# Patient Record
Sex: FEMALE | Race: WHITE | Employment: OTHER | URBAN - NONMETROPOLITAN AREA
[De-identification: names, ages, dates, MRNs, and addresses within clinical notes are randomized per-mention and may not be internally consistent; named-entity substitution may affect disease eponyms.]

---

## 2017-04-14 ENCOUNTER — OFFICE VISIT (OUTPATIENT)
Dept: NEUROLOGY | Age: 67
End: 2017-04-14
Payer: COMMERCIAL

## 2017-04-14 VITALS
BODY MASS INDEX: 26.82 KG/M2 | HEART RATE: 83 BPM | HEIGHT: 63 IN | WEIGHT: 151.38 LBS | DIASTOLIC BLOOD PRESSURE: 88 MMHG | OXYGEN SATURATION: 99 % | SYSTOLIC BLOOD PRESSURE: 136 MMHG

## 2017-04-14 DIAGNOSIS — R53.83 OTHER FATIGUE: Primary | ICD-10-CM

## 2017-04-14 DIAGNOSIS — R42 DIZZINESS: ICD-10-CM

## 2017-04-14 DIAGNOSIS — R53.1 WEAKNESS: ICD-10-CM

## 2017-04-14 PROCEDURE — 99204 OFFICE O/P NEW MOD 45 MIN: CPT | Performed by: PSYCHIATRY & NEUROLOGY

## 2017-04-14 RX ORDER — ASCORBIC ACID 500 MG
500 TABLET ORAL DAILY
COMMUNITY
End: 2018-10-02 | Stop reason: ALTCHOICE

## 2017-04-14 RX ORDER — RANITIDINE 300 MG/1
300 TABLET ORAL DAILY
COMMUNITY
End: 2020-02-24 | Stop reason: SINTOL

## 2017-04-14 RX ORDER — LEVOTHYROXINE SODIUM 0.12 MG/1
150 TABLET ORAL DAILY
COMMUNITY
End: 2018-01-30

## 2017-04-14 RX ORDER — NITROFURANTOIN MACROCRYSTALS 100 MG/1
100 CAPSULE ORAL PRN
COMMUNITY
End: 2021-05-10 | Stop reason: SDUPTHER

## 2017-04-14 RX ORDER — OMEGA-3S/DHA/EPA/FISH OIL/D3 300MG-1000
400 CAPSULE ORAL DAILY
COMMUNITY
End: 2018-01-30

## 2017-04-14 RX ORDER — VITAMIN B COMPLEX
1 CAPSULE ORAL DAILY
COMMUNITY
End: 2018-10-02 | Stop reason: ALTCHOICE

## 2017-07-03 ENCOUNTER — TRANSCRIBE ORDERS (OUTPATIENT)
Dept: ADMINISTRATIVE | Facility: HOSPITAL | Age: 67
End: 2017-07-03

## 2017-07-03 DIAGNOSIS — Z12.31 ENCOUNTER FOR SCREENING MAMMOGRAM FOR MALIGNANT NEOPLASM OF BREAST: Primary | ICD-10-CM

## 2017-07-17 ENCOUNTER — HOSPITAL ENCOUNTER (OUTPATIENT)
Dept: MAMMOGRAPHY | Facility: HOSPITAL | Age: 67
Discharge: HOME | End: 2017-07-17
Attending: OBSTETRICS & GYNECOLOGY | Admitting: OBSTETRICS & GYNECOLOGY

## 2017-07-17 DIAGNOSIS — Z12.31 ENCOUNTER FOR SCREENING MAMMOGRAM FOR MALIGNANT NEOPLASM OF BREAST: ICD-10-CM

## 2017-07-17 PROCEDURE — G0202 SCR MAMMO BI INCL CAD: HCPCS

## 2017-07-17 PROCEDURE — 77063 BREAST TOMOSYNTHESIS BI: CPT

## 2018-01-30 ENCOUNTER — OFFICE VISIT (OUTPATIENT)
Dept: GASTROENTEROLOGY | Age: 68
End: 2018-01-30
Payer: MEDICARE

## 2018-01-30 VITALS
HEIGHT: 62 IN | WEIGHT: 157.8 LBS | OXYGEN SATURATION: 99 % | BODY MASS INDEX: 29.04 KG/M2 | DIASTOLIC BLOOD PRESSURE: 74 MMHG | SYSTOLIC BLOOD PRESSURE: 132 MMHG | HEART RATE: 82 BPM

## 2018-01-30 DIAGNOSIS — R10.32 LEFT LOWER QUADRANT ABDOMINAL PAIN OF UNKNOWN ETIOLOGY: ICD-10-CM

## 2018-01-30 DIAGNOSIS — R19.7 DIARRHEA, UNSPECIFIED TYPE: ICD-10-CM

## 2018-01-30 DIAGNOSIS — D64.9 ANEMIA, UNSPECIFIED TYPE: Primary | ICD-10-CM

## 2018-01-30 PROCEDURE — 3014F SCREEN MAMMO DOC REV: CPT | Performed by: NURSE PRACTITIONER

## 2018-01-30 PROCEDURE — 1036F TOBACCO NON-USER: CPT | Performed by: NURSE PRACTITIONER

## 2018-01-30 PROCEDURE — 4040F PNEUMOC VAC/ADMIN/RCVD: CPT | Performed by: NURSE PRACTITIONER

## 2018-01-30 PROCEDURE — 1123F ACP DISCUSS/DSCN MKR DOCD: CPT | Performed by: NURSE PRACTITIONER

## 2018-01-30 PROCEDURE — 3017F COLORECTAL CA SCREEN DOC REV: CPT | Performed by: NURSE PRACTITIONER

## 2018-01-30 PROCEDURE — G8419 CALC BMI OUT NRM PARAM NOF/U: HCPCS | Performed by: NURSE PRACTITIONER

## 2018-01-30 PROCEDURE — G8427 DOCREV CUR MEDS BY ELIG CLIN: HCPCS | Performed by: NURSE PRACTITIONER

## 2018-01-30 PROCEDURE — 1090F PRES/ABSN URINE INCON ASSESS: CPT | Performed by: NURSE PRACTITIONER

## 2018-01-30 PROCEDURE — G8400 PT W/DXA NO RESULTS DOC: HCPCS | Performed by: NURSE PRACTITIONER

## 2018-01-30 PROCEDURE — G8484 FLU IMMUNIZE NO ADMIN: HCPCS | Performed by: NURSE PRACTITIONER

## 2018-01-30 PROCEDURE — 99203 OFFICE O/P NEW LOW 30 MIN: CPT | Performed by: NURSE PRACTITIONER

## 2018-01-30 RX ORDER — CHOLECALCIFEROL (VITAMIN D3) 1MM UNIT/G
LIQUID (GRAM) MISCELLANEOUS
COMMUNITY

## 2018-01-30 RX ORDER — LEVOTHYROXINE SODIUM 0.03 MG/1
50 TABLET ORAL
COMMUNITY
Start: 2018-01-22 | End: 2018-09-06 | Stop reason: DRUGHIGH

## 2018-01-30 ASSESSMENT — ENCOUNTER SYMPTOMS
BLOOD IN STOOL: 0
COUGH: 0
VOMITING: 0
NAUSEA: 0
SHORTNESS OF BREATH: 0
ANAL BLEEDING: 0
VOICE CHANGE: 0
ABDOMINAL DISTENTION: 0
SORE THROAT: 0
CONSTIPATION: 0
RECTAL PAIN: 0
ABDOMINAL PAIN: 1
BACK PAIN: 1
DIARRHEA: 1
TROUBLE SWALLOWING: 0

## 2018-01-30 NOTE — PATIENT INSTRUCTIONS
You are going to have a colonoscopy and here are some instructions: You will be given specific directions regarding restrictions to diet and bowel prep instructions including laxatives. Please read these instructions one week prior to your scheduled procedure to ensure that you are prepared. Follow prep instructions provided for bowel prep. Take all of the bowel prep as directed. If you are having problems with nausea, stop your prep for 30-45 min to allow the nausea to subside before resuming your prep. Nothing to eat or drink after midnight the day of the procedure EXCEPT:  PLEASE TAKE MEDICATION(S) FOR HIGH BLOOD PRESSURE, THYROID, SEIZURES, AND HEART THE MORNING OF THE PROCEDURE WITH A SIP OF WATER  AT LEAST 2 HOURS PRIOR TO ARRIVAL TIME.   YOU MAY ALSO TAKE ANY INHALERS YOU ARE PRESCRIBED. You will not be able to drive for 24 hours after the procedure due to sedation. Bring a  with you the day of the procedure. No aspirin, ibuprofen, naproxen, fish oil or vitamin E for 5 days before procedure. If you are on blood thinners, clearance from the prescribing physician will be obtained before your procedure is scheduled. Increased risks may be associated with discontinuation of your blood thinner and include, but not limited to, stroke, TIA, or cardiac event. If polyps are removed during the procedure they will be sent to a pathologist for analysis. You will be notified by mail of the pathology results in 2-3 weeks. Your physician may also schedule a follow up appointment with the nurse practitioner to discuss pathology, symptoms or to check if you have had any problems related to your procedure. If you prefer not to return to the office after your procedure please discuss this with your physician on the day of your colonoscopy. Final recommendations are based on the pathologist report.      Your diet before a colonoscopy bowel preparation is very important to ensure a successful colon

## 2018-02-05 ENCOUNTER — HOSPITAL ENCOUNTER (OUTPATIENT)
Age: 68
Setting detail: SPECIMEN
Discharge: HOME | End: 2018-02-05
Payer: MEDICARE

## 2018-02-05 ENCOUNTER — HOSPITAL ENCOUNTER (OUTPATIENT)
Age: 68
Setting detail: SPECIMEN
End: 2018-02-05
Payer: MEDICARE

## 2018-02-05 ENCOUNTER — ANESTHESIA EVENT (OUTPATIENT)
Dept: OPERATING ROOM | Age: 68
End: 2018-02-05

## 2018-02-05 ENCOUNTER — HOSPITAL ENCOUNTER (OUTPATIENT)
Age: 68
Setting detail: OUTPATIENT SURGERY
Discharge: HOME | End: 2018-02-05
Attending: INTERNAL MEDICINE | Admitting: INTERNAL MEDICINE
Payer: MEDICARE

## 2018-02-05 ENCOUNTER — ANESTHESIA (OUTPATIENT)
Dept: OPERATING ROOM | Age: 68
End: 2018-02-05

## 2018-02-05 VITALS
HEART RATE: 61 BPM | DIASTOLIC BLOOD PRESSURE: 76 MMHG | OXYGEN SATURATION: 100 % | SYSTOLIC BLOOD PRESSURE: 129 MMHG | RESPIRATION RATE: 16 BRPM | TEMPERATURE: 98.3 F

## 2018-02-05 VITALS — DIASTOLIC BLOOD PRESSURE: 80 MMHG | SYSTOLIC BLOOD PRESSURE: 132 MMHG | OXYGEN SATURATION: 98 %

## 2018-02-05 PROBLEM — D50.9 IRON DEFICIENCY ANEMIA: Status: ACTIVE | Noted: 2018-02-05

## 2018-02-05 PROBLEM — Z83.719 FAMILY HISTORY OF COLONIC POLYPS: Status: ACTIVE | Noted: 2018-02-05

## 2018-02-05 PROBLEM — Z80.0 FAMILY HISTORY OF GASTRIC CANCER: Status: ACTIVE | Noted: 2018-02-05

## 2018-02-05 PROBLEM — R10.32 LLQ PAIN: Status: ACTIVE | Noted: 2018-02-05

## 2018-02-05 PROBLEM — Z83.71 FAMILY HISTORY OF COLONIC POLYPS: Status: ACTIVE | Noted: 2018-02-05

## 2018-02-05 PROCEDURE — 45385 COLONOSCOPY W/LESION REMOVAL: CPT

## 2018-02-05 PROCEDURE — 88305 TISSUE EXAM BY PATHOLOGIST: CPT

## 2018-02-05 PROCEDURE — 45380 COLONOSCOPY AND BIOPSY: CPT | Performed by: INTERNAL MEDICINE

## 2018-02-05 PROCEDURE — 45380 COLONOSCOPY AND BIOPSY: CPT

## 2018-02-05 PROCEDURE — 45385 COLONOSCOPY W/LESION REMOVAL: CPT | Performed by: INTERNAL MEDICINE

## 2018-02-05 PROCEDURE — 87077 CULTURE AEROBIC IDENTIFY: CPT

## 2018-02-05 PROCEDURE — 43239 EGD BIOPSY SINGLE/MULTIPLE: CPT

## 2018-02-05 PROCEDURE — G8918 PT W/O PREOP ORDER IV AB PRO: HCPCS

## 2018-02-05 PROCEDURE — 43239 EGD BIOPSY SINGLE/MULTIPLE: CPT | Performed by: INTERNAL MEDICINE

## 2018-02-05 PROCEDURE — G8907 PT DOC NO EVENTS ON DISCHARG: HCPCS

## 2018-02-05 RX ORDER — ONDANSETRON 2 MG/ML
INJECTION INTRAMUSCULAR; INTRAVENOUS PRN
Status: DISCONTINUED | OUTPATIENT
Start: 2018-02-05 | End: 2018-02-05 | Stop reason: SDUPTHER

## 2018-02-05 RX ORDER — SODIUM CHLORIDE 9 MG/ML
INJECTION, SOLUTION INTRAVENOUS CONTINUOUS
Status: DISCONTINUED | OUTPATIENT
Start: 2018-02-05 | End: 2018-02-05 | Stop reason: HOSPADM

## 2018-02-05 RX ORDER — PROPOFOL 10 MG/ML
INJECTION, EMULSION INTRAVENOUS PRN
Status: DISCONTINUED | OUTPATIENT
Start: 2018-02-05 | End: 2018-02-05 | Stop reason: SDUPTHER

## 2018-02-05 RX ADMIN — ONDANSETRON 4 MG: 2 INJECTION INTRAMUSCULAR; INTRAVENOUS at 10:58

## 2018-02-05 RX ADMIN — PROPOFOL 250 MG: 10 INJECTION, EMULSION INTRAVENOUS at 10:55

## 2018-02-05 RX ADMIN — SODIUM CHLORIDE: 9 INJECTION, SOLUTION INTRAVENOUS at 10:38

## 2018-02-05 ASSESSMENT — PAIN SCALES - GENERAL
PAINLEVEL_OUTOF10: 0
PAINLEVEL_OUTOF10: 0

## 2018-02-05 NOTE — H&P
mouth    Historical Provider, MD   nitrofurantoin (MACRODANTIN) 100 MG capsule Take 100 mg by mouth as needed    Historical Provider, MD   CRANBERRY PO Take by mouth    Historical Provider, MD   b complex vitamins capsule Take 1 capsule by mouth daily    Historical Provider, MD   Ascorbic Acid (VITAMIN C) 500 MG tablet Take 500 mg by mouth daily    Historical Provider, MD       Allergies:   is allergic to demerol hcl [meperidine] and morphine. Vital Signs:   Vitals:    02/05/18 1032   BP: (!) 141/85   Pulse: 73   Resp: 18   Temp: 98.8 °F (37.1 °C)   SpO2: 99%       ROS:  Cardiac:  [x]WNL  []Comments:  Pulmonary:  [x]WNL   []Comments:  Neuro/Mental Status:  [x]WNL  []Comments:  Abdominal:                   Active Hospital Problems    Diagnosis Date Noted    Family history of colonic polyps [Z83.71] 02/05/2018    Family history of gastric cancer [Z80.0] 02/05/2018    Iron deficiency anemia [D50.9] 02/05/2018    LLQ pain [R10.32] 02/05/2018        All other pertinent GI symptoms negative. Physical Exam:  Cardiac:  [x]WNL  []Comments:  Pulmonary:  [x]WNL   []Comments:  Neuro/Mental Status:  [x]WNL  []Comments:  Abdominal:  [x]WNL    []Comments:  Other:   []WNL  []Comments:    Informed Consent:  The risks and benefits of the procedure have been discussed with either the patient or if they cannot consent, their representative. Assessment:  Patient examined and appropriate for planned sedation and procedure. Plan:  Proceed with planned sedation and procedure as above.     Jw Stanton DO  10:52 AM

## 2018-02-05 NOTE — OP NOTE
Post Procedure Note    Name of surgeon / : Adrián Olsen DO    Date of Service: 02/05/18    Withdrawal Time: >6min    Prep Quality: excellent     Pre-operative Diagnosis:   Active Hospital Problems    Diagnosis Date Noted    Family history of colonic polyps [Z83.71] 02/05/2018    Family history of gastric cancer [Z80.0] 02/05/2018    Iron deficiency anemia [D50.9] 02/05/2018    LLQ pain [R10.32] 02/05/2018       Post-operative Diagnosis/Findings: colon polyps, normal egd    Procedure: Procedure(s):  COLONOSCOPY WITH hot snare polypectomy and cold biopsy polypectomy  EGD BIOPSY     Anesthesia: Monitor Anesthesia Care    Surgeons/Assistants: Adrián Olsen DO    Referring Physician: John Hamilton CNP    Procedure Note:  After informed consent was obtained, the patient was placed in the left lateral decubitus position and sedated per MAC. A rectal exam was done which was normal.  The colonoscope was inserted into the rectum and retroflexed which was normal.  The colonoscope was then advanced to the cecum under direct visualization. The appendiceal orifice and ileocecal valve were identified. A polyp was seen in the cecum. It was dimunitive in size. It was removed via removed by snare cautery and retrieved for pathology. A polyp was seen in the sigmoid. It was dimuntive in size. It was removed via removed by cold biopsy and sent for pathology. Random biopsies were also obtained to rule out microscopic colitis. The colonoscope was withdrawn. No other abnormalities were discovered. The colonoscope was completely withdrawn. The patient tolerated the procedure. The patient was then positioned for an upper endoscopy. The endoscope was advanced down the oropharynx, down the esophagus, through the gastric lumen, into the duodenum. The small bowel appeared normal.  biopses were obtained. The gastric antrum was normal.  Antral biopsies were obtained for h. Pylori.   The gastric body was normal.  Retroflexion was done which showed a normal fundus. The scope was withdrawn. The GE junction was at 38 cm. It was normal.  The remaining esophagus was normal.  The scope was withdrawn. Estimated Blood Loss: Minimal    Complications: None    Specimens:   ID Type Source Tests Collected by Time Destination   1 : JR TEST Tissue Stomach JR TEST Sidney Stanton, DO 2/5/2018 1121    A : cecal polyp Tissue Cecum SURGICAL PATHOLOGY Sidney Gisela Stanton, DO 2/5/2018 1104    B : sigmoid colon polyp x2 Tissue Sigmoid Colon [facility]GICAL PATHOLOGY [facility] Maximino, DO 2/5/2018 1111    C : duodenum bx RO ciliac Tissue Duodenum SURGICAL PATHOLOGY Sidney Gisela Stanton, DO 2/5/2018 1116    D : random bx hx microscopic colitis Tissue Colon SURGICAL PATHOLOGY [facility] Maximino, DO 2/5/2018 1120        Discussion: The patient had colon polyps. Repeat colonoscopy in 5 years. If she is positive for microscopic colitis, we will treat. If she is positive for h. Pylori we will treat. Otherwise, no evidence of GI blood loss regarding anemia.     Fortunato Peralta DO  02/05/18  11:25 AM

## 2018-02-05 NOTE — ANESTHESIA PRE PROCEDURE
(If Applicable): No results found for: PREGTESTUR, PREGSERUM, HCG, HCGQUANT     ABGs: No results found for: PHART, PO2ART, XPK5DBG, OPB4LOU, BEART, T4IBBKBT     Type & Screen (If Applicable):  No results found for: [facility]    Anesthesia Evaluation  Patient summary reviewed and Nursing notes reviewed  Airway:   TM distance: >3 FB   Neck ROM: full  Mouth opening: > = 3 FB Dental:    (+) upper dentures and lower dentures      Pulmonary:Negative Pulmonary ROS and normal exam                               Cardiovascular:Negative CV ROS                      Neuro/Psych:   Negative Neuro/Psych ROS              GI/Hepatic/Renal:   (+) GERD:,           Endo/Other: Negative Endo/Other ROS                    Abdominal:           Vascular: negative vascular ROS. Anesthesia Plan      MAC     ASA 2       Induction: intravenous. MIPS: Prophylactic antiemetics administered. Anesthetic plan and risks discussed with patient and spouse.                       Deidre Key CRNA   2/5/2018

## 2018-02-05 NOTE — ANESTHESIA POSTPROCEDURE EVALUATION
Department of Anesthesiology  Postprocedure Note    Patient: Michelle Gordon  MRN: [id number]  YOB: 1950  Date of evaluation: 2/5/2018  Time:  11:25 AM     Procedure Summary     Date:  02/05/18 Room / Location:  [facility] ENDO 01 / [facility] OR    Anesthesia Start:  7817 Anesthesia Stop:  1287    Procedures:       COLONOSCOPY WITH BIOPSY (N/A )      EGD BIOPSY (N/A ) Diagnosis:  (ANEMIA, LLQ ABD PAIN)    Surgeon:  Hermes Haskins DO Responsible Provider:  Annabelle Schultz CRNA    Anesthesia Type:  MAC ASA Status:  2          Anesthesia Type: MAC    Corina Phase I:      Corina Phase II:      Last vitals: Reviewed and per EMR flowsheets.        Anesthesia Post Evaluation    Patient location during evaluation: bedside  Patient participation: complete - patient participated  Level of consciousness: awake  Pain score: 0  Airway patency: patent  Nausea & Vomiting: no vomiting and no nausea  Complications: no  Cardiovascular status: hemodynamically stable  Respiratory status: acceptable  Hydration status: stable

## 2018-02-06 LAB — CLOTEST: NEGATIVE

## 2018-02-12 ENCOUNTER — TELEPHONE (OUTPATIENT)
Dept: GASTROENTEROLOGY | Age: 68
End: 2018-02-12

## 2018-02-28 ENCOUNTER — OFFICE VISIT (OUTPATIENT)
Dept: GASTROENTEROLOGY | Age: 68
End: 2018-02-28
Payer: MEDICARE

## 2018-02-28 VITALS
SYSTOLIC BLOOD PRESSURE: 128 MMHG | DIASTOLIC BLOOD PRESSURE: 82 MMHG | HEIGHT: 62 IN | HEART RATE: 60 BPM | WEIGHT: 154.8 LBS | BODY MASS INDEX: 28.49 KG/M2 | OXYGEN SATURATION: 98 %

## 2018-02-28 DIAGNOSIS — R19.7 DIARRHEA, UNSPECIFIED TYPE: Primary | ICD-10-CM

## 2018-02-28 DIAGNOSIS — R10.31 BILATERAL LOWER ABDOMINAL CRAMPING: ICD-10-CM

## 2018-02-28 DIAGNOSIS — R10.32 BILATERAL LOWER ABDOMINAL CRAMPING: ICD-10-CM

## 2018-02-28 PROCEDURE — G8419 CALC BMI OUT NRM PARAM NOF/U: HCPCS | Performed by: NURSE PRACTITIONER

## 2018-02-28 PROCEDURE — 3017F COLORECTAL CA SCREEN DOC REV: CPT | Performed by: NURSE PRACTITIONER

## 2018-02-28 PROCEDURE — 1036F TOBACCO NON-USER: CPT | Performed by: NURSE PRACTITIONER

## 2018-02-28 PROCEDURE — G8484 FLU IMMUNIZE NO ADMIN: HCPCS | Performed by: NURSE PRACTITIONER

## 2018-02-28 PROCEDURE — 99213 OFFICE O/P EST LOW 20 MIN: CPT | Performed by: NURSE PRACTITIONER

## 2018-02-28 PROCEDURE — G8400 PT W/DXA NO RESULTS DOC: HCPCS | Performed by: NURSE PRACTITIONER

## 2018-02-28 PROCEDURE — 1090F PRES/ABSN URINE INCON ASSESS: CPT | Performed by: NURSE PRACTITIONER

## 2018-02-28 PROCEDURE — 1123F ACP DISCUSS/DSCN MKR DOCD: CPT | Performed by: NURSE PRACTITIONER

## 2018-02-28 PROCEDURE — 3014F SCREEN MAMMO DOC REV: CPT | Performed by: NURSE PRACTITIONER

## 2018-02-28 PROCEDURE — G8427 DOCREV CUR MEDS BY ELIG CLIN: HCPCS | Performed by: NURSE PRACTITIONER

## 2018-02-28 PROCEDURE — 4040F PNEUMOC VAC/ADMIN/RCVD: CPT | Performed by: NURSE PRACTITIONER

## 2018-02-28 RX ORDER — DICYCLOMINE HCL 20 MG
20 TABLET ORAL
Qty: 90 TABLET | Refills: 11 | Status: SHIPPED | OUTPATIENT
Start: 2018-02-28 | End: 2019-04-01 | Stop reason: SDUPTHER

## 2018-02-28 ASSESSMENT — ENCOUNTER SYMPTOMS
ABDOMINAL DISTENTION: 0
BLOOD IN STOOL: 0
VOICE CHANGE: 0
ANAL BLEEDING: 0
VOMITING: 0
COUGH: 0
NAUSEA: 0
TROUBLE SWALLOWING: 0
SORE THROAT: 0
DIARRHEA: 1
RECTAL PAIN: 0
CONSTIPATION: 0
ABDOMINAL PAIN: 1
BACK PAIN: 0
SHORTNESS OF BREATH: 0

## 2018-04-25 ENCOUNTER — OFFICE VISIT (OUTPATIENT)
Dept: GASTROENTEROLOGY | Age: 68
End: 2018-04-25
Payer: MEDICARE

## 2018-04-25 VITALS
BODY MASS INDEX: 27.25 KG/M2 | SYSTOLIC BLOOD PRESSURE: 124 MMHG | DIASTOLIC BLOOD PRESSURE: 76 MMHG | WEIGHT: 153.8 LBS | OXYGEN SATURATION: 98 % | HEART RATE: 68 BPM | HEIGHT: 63 IN

## 2018-04-25 DIAGNOSIS — R10.31 BILATERAL LOWER ABDOMINAL CRAMPING: Primary | ICD-10-CM

## 2018-04-25 DIAGNOSIS — R10.32 BILATERAL LOWER ABDOMINAL CRAMPING: Primary | ICD-10-CM

## 2018-04-25 DIAGNOSIS — R19.5 LOOSE STOOLS: ICD-10-CM

## 2018-04-25 PROCEDURE — G8427 DOCREV CUR MEDS BY ELIG CLIN: HCPCS | Performed by: NURSE PRACTITIONER

## 2018-04-25 PROCEDURE — 1090F PRES/ABSN URINE INCON ASSESS: CPT | Performed by: NURSE PRACTITIONER

## 2018-04-25 PROCEDURE — G8419 CALC BMI OUT NRM PARAM NOF/U: HCPCS | Performed by: NURSE PRACTITIONER

## 2018-04-25 PROCEDURE — G8400 PT W/DXA NO RESULTS DOC: HCPCS | Performed by: NURSE PRACTITIONER

## 2018-04-25 PROCEDURE — 3017F COLORECTAL CA SCREEN DOC REV: CPT | Performed by: NURSE PRACTITIONER

## 2018-04-25 PROCEDURE — 4040F PNEUMOC VAC/ADMIN/RCVD: CPT | Performed by: NURSE PRACTITIONER

## 2018-04-25 PROCEDURE — 1036F TOBACCO NON-USER: CPT | Performed by: NURSE PRACTITIONER

## 2018-04-25 PROCEDURE — 1123F ACP DISCUSS/DSCN MKR DOCD: CPT | Performed by: NURSE PRACTITIONER

## 2018-04-25 PROCEDURE — 99213 OFFICE O/P EST LOW 20 MIN: CPT | Performed by: NURSE PRACTITIONER

## 2018-04-25 RX ORDER — LISINOPRIL 10 MG/1
TABLET ORAL
COMMUNITY
Start: 2018-04-12 | End: 2018-09-06 | Stop reason: DRUGHIGH

## 2018-04-25 RX ORDER — THYROID, PORCINE 60 MG/1
60 TABLET ORAL DAILY
COMMUNITY
End: 2021-11-09

## 2018-04-25 ASSESSMENT — ENCOUNTER SYMPTOMS
NAUSEA: 0
ABDOMINAL PAIN: 1
BACK PAIN: 0
BLOOD IN STOOL: 0
ABDOMINAL DISTENTION: 0
SHORTNESS OF BREATH: 0
CONSTIPATION: 0
COUGH: 0
ANAL BLEEDING: 0
TROUBLE SWALLOWING: 0
DIARRHEA: 1
VOICE CHANGE: 0
VOMITING: 0
RECTAL PAIN: 0

## 2018-06-18 ENCOUNTER — TELEPHONE (OUTPATIENT)
Dept: CARDIOLOGY | Age: 68
End: 2018-06-18

## 2018-06-19 ENCOUNTER — TELEPHONE (OUTPATIENT)
Dept: CARDIOLOGY | Age: 68
End: 2018-06-19

## 2018-07-10 ENCOUNTER — HOSPITAL ENCOUNTER (OUTPATIENT)
Dept: NON INVASIVE DIAGNOSTICS | Age: 68
Discharge: HOME | End: 2018-07-10
Payer: MEDICARE

## 2018-07-10 PROCEDURE — 93229 REMOTE 30 DAY ECG TECH SUPP: CPT

## 2018-07-10 PROCEDURE — 93272 ECG/REVIEW INTERPRET ONLY: CPT | Performed by: INTERNAL MEDICINE

## 2018-08-17 NOTE — PROCEDURES
[address] [facility] REPORT    PATIENT:  Mukesh Somers   :  1950  MRN:  [id number]    ORDERING PROVIDER:  Dr. Aspen Pitts:   Palpitations    MONITORING PERIOD:  549 hrs    INTERPRETATION DATE: 2018      INTERPRETING CARDIOLOGIST:  Shane Aguilar MD, MSc    FINDINGS  1. The basleine rhythm is normal sinus at a mean heart rate of 70 bpm, with a range of 48 to 127 bpm.  2. There were 3 auto triggered events with the corresponding rhythms of Sinus, Rhythm. 3. There were 11 patient triggered events with the corresponding rhythms of Sinus, sinus tach, sinus arrhythmia, atrial tachycardia. There was 61 hrs of bradycardia and 11 hrs of tachycardia  4. Prolonged pause(s) was not noted. CONCLUSIONS  This 30 day event monitor shows moderate correlation between rhythms and symptoms.     Brayden Hall MD  [id number] [facility]  18

## 2018-09-06 ENCOUNTER — OFFICE VISIT (OUTPATIENT)
Dept: CARDIOLOGY | Age: 68
End: 2018-09-06
Payer: MEDICARE

## 2018-09-06 VITALS
HEIGHT: 62 IN | DIASTOLIC BLOOD PRESSURE: 90 MMHG | HEART RATE: 60 BPM | SYSTOLIC BLOOD PRESSURE: 130 MMHG | BODY MASS INDEX: 28.52 KG/M2 | WEIGHT: 155 LBS

## 2018-09-06 DIAGNOSIS — G93.32 CHRONIC FATIGUE SYNDROME: ICD-10-CM

## 2018-09-06 DIAGNOSIS — E06.3 HYPOTHYROIDISM DUE TO HASHIMOTO'S THYROIDITIS: ICD-10-CM

## 2018-09-06 DIAGNOSIS — R42 DIZZINESS: ICD-10-CM

## 2018-09-06 DIAGNOSIS — E03.8 HYPOTHYROIDISM DUE TO HASHIMOTO'S THYROIDITIS: ICD-10-CM

## 2018-09-06 DIAGNOSIS — I10 ESSENTIAL HYPERTENSION: Primary | ICD-10-CM

## 2018-09-06 PROBLEM — R55 NEAR SYNCOPE: Status: ACTIVE | Noted: 2018-06-09

## 2018-09-06 PROBLEM — N18.9 CHRONIC RENAL FAILURE: Status: ACTIVE | Noted: 2018-05-21

## 2018-09-06 PROCEDURE — 3017F COLORECTAL CA SCREEN DOC REV: CPT | Performed by: NURSE PRACTITIONER

## 2018-09-06 PROCEDURE — 99213 OFFICE O/P EST LOW 20 MIN: CPT | Performed by: NURSE PRACTITIONER

## 2018-09-06 PROCEDURE — 1036F TOBACCO NON-USER: CPT | Performed by: NURSE PRACTITIONER

## 2018-09-06 PROCEDURE — G8419 CALC BMI OUT NRM PARAM NOF/U: HCPCS | Performed by: NURSE PRACTITIONER

## 2018-09-06 PROCEDURE — 4040F PNEUMOC VAC/ADMIN/RCVD: CPT | Performed by: NURSE PRACTITIONER

## 2018-09-06 PROCEDURE — 93000 ELECTROCARDIOGRAM COMPLETE: CPT | Performed by: NURSE PRACTITIONER

## 2018-09-06 PROCEDURE — G8427 DOCREV CUR MEDS BY ELIG CLIN: HCPCS | Performed by: NURSE PRACTITIONER

## 2018-09-06 PROCEDURE — 1101F PT FALLS ASSESS-DOCD LE1/YR: CPT | Performed by: NURSE PRACTITIONER

## 2018-09-06 PROCEDURE — 1090F PRES/ABSN URINE INCON ASSESS: CPT | Performed by: NURSE PRACTITIONER

## 2018-09-06 PROCEDURE — 1123F ACP DISCUSS/DSCN MKR DOCD: CPT | Performed by: NURSE PRACTITIONER

## 2018-09-06 PROCEDURE — G8400 PT W/DXA NO RESULTS DOC: HCPCS | Performed by: NURSE PRACTITIONER

## 2018-09-06 RX ORDER — LEVOTHYROXINE SODIUM 0.03 MG/1
37.5 TABLET ORAL DAILY
COMMUNITY
End: 2021-11-09

## 2018-09-06 RX ORDER — LISINOPRIL 5 MG/1
2.5 TABLET ORAL DAILY
COMMUNITY
End: 2018-09-06 | Stop reason: DRUGHIGH

## 2018-09-06 RX ORDER — FOLIC ACID 0.8 MG
500 TABLET ORAL DAILY
COMMUNITY

## 2018-09-06 RX ORDER — LISINOPRIL 5 MG/1
5 TABLET ORAL DAILY
Qty: 30 TABLET | Refills: 3 | Status: SHIPPED | COMMUNITY
Start: 2018-09-06 | End: 2018-12-26 | Stop reason: SDUPTHER

## 2018-09-06 NOTE — PROGRESS NOTES
Disp: , Rfl:     Magnesium 500 MG CAPS, Take 500 mg by mouth daily, Disp: , Rfl:     Nutritional Supplements (ADRENAL COMPLEX PO), Take by mouth 2 times daily, Disp: , Rfl:     lisinopril (PRINIVIL;ZESTRIL) 5 MG tablet, Take 1 tablet by mouth daily, Disp: 30 tablet, Rfl: 3    thyroid (ARMOUR) 65 MG tablet, Take 65 mg by mouth daily, Disp: , Rfl:     dicyclomine (BENTYL) 20 MG tablet, Take 1 tablet by mouth 3 times daily (before meals), Disp: 90 tablet, Rfl: 11    NALTREXONE HCL PO, Take 4.5 mg by mouth, Disp: , Rfl:     NONFORMULARY, , Disp: , Rfl:     Cholecalciferol (VITAMIN D3) LIQD, by Does not apply route, Disp: , Rfl:     NONFORMULARY, 3 times daily, Disp: , Rfl:     NONFORMULARY, 3 times daily, Disp: , Rfl:     NONFORMULARY, 200 mg daily, Disp: , Rfl:     NONFORMULARY, 5 mg daily, Disp: , Rfl:     NONFORMULARY, , Disp: , Rfl:     NONFORMULARY, , Disp: , Rfl:     NONFORMULARY, , Disp: , Rfl:     ranitidine (ZANTAC) 300 MG tablet, Take 300 mg by mouth 2 times daily, Disp: , Rfl:     Nutritional Supplements (DHEA PO), Take 5 mg by mouth , Disp: , Rfl:     nitrofurantoin (MACRODANTIN) 100 MG capsule, Take 100 mg by mouth as needed, Disp: , Rfl:     CRANBERRY PO, Take 500 mg by mouth daily , Disp: , Rfl:     b complex vitamins capsule, Take 1 capsule by mouth daily, Disp: , Rfl:     Ascorbic Acid (VITAMIN C) 500 MG tablet, Take 500 mg by mouth daily, Disp: , Rfl:     PE:  Vitals:    09/06/18 1034   BP: (!) 130/90   Pulse:        Estimated body mass index is 28.35 kg/m² as calculated from the following:    Height as of this encounter: 5' 2\" (1.575 m). Weight as of this encounter: 155 lb (70.3 kg). Constitutional: She is oriented to person, place, and time. She appears well-developed and well-nourished in no acute distress. Head: Normocephalic and atraumatic. Neck:  Neck supple without JVD present. Cardiovascular: Normal rate, regular rhythm, normal heart sounds. no murmur ascultated. No gallop and no friction rub.  no carotid bruits. no peripheral edema. Pulmonary/Chest:  Lungs clear to auscultation bilaterally without evidence of respiratory distress. She without wheezes. She without rales or ronchi. Musculoskeletal: Normal range of motion. Gait is normal no assitive device. Neurological: She is alert and oriented to person, place, and time. Skin: Skin is warm and dry without rash or pallor. Psychiatric: She has a normal mood and affect. Her behavior is normal. Thought content normal.     No results found for: CREATININE, HGB, PROBNP    ECG 09/06/18  Normal sinus rhythm and 61 BPM    INTERPRETATION DATE: 8/17/2018      INTERPRETING CARDIOLOGIST: Beckie Betancourt MD, MSc    FINDINGS  1. The basleine rhythm is normal sinus at a mean heart rate of 70   bpm, with a range of 48 to 127 bpm.  2. There were 3 auto triggered events with the corresponding   rhythms of Sinus, Rhythm. 3. There were 11 patient triggered events with the corresponding   rhythms of Sinus, sinus tach, sinus arrhythmia, atrial   tachycardia. There was 61 hrs of bradycardia and 11 hrs of   tachycardia  4. Prolonged pause(s) was not noted. CONCLUSIONS  This 30 day event monitor shows moderate correlation between   rhythms and symptoms. Aimee Law MD  [facility]  8/17/18    Assessment    1. Essential hypertension    2. Hypothyroidism due to Hashimoto's thyroiditis    3. Dizziness    4. Chronic fatigue syndrome    5. Tachy-ellen      Plan:    Discussed with Dr. Khai Martell. She will keep a BP log taking it a couple of times in am/ mid day/ and pm. She will keep her HR. She will send this to us in about 2 weeks. She will increase her Lisinopril to 5 mg daily. Differential includes possible dysautonomia. Will check orthostatic BP. SBP dropped 10 points from laying to sitting but went up with standing.      Disposition - RTC in Nov as scheduled  or sooner if needed    Please do not hesitate to contact me for any questions or concerns.     Sincerely yours,    GABBY Franco

## 2018-09-21 ENCOUNTER — APPOINTMENT (OUTPATIENT)
Dept: CT IMAGING | Age: 68
End: 2018-09-21
Payer: MEDICARE

## 2018-09-21 ENCOUNTER — HOSPITAL ENCOUNTER (EMERGENCY)
Age: 68
Discharge: HOME | End: 2018-09-21
Payer: MEDICARE

## 2018-09-21 VITALS
DIASTOLIC BLOOD PRESSURE: 84 MMHG | BODY MASS INDEX: 27.6 KG/M2 | RESPIRATION RATE: 18 BRPM | HEIGHT: 62 IN | WEIGHT: 150 LBS | SYSTOLIC BLOOD PRESSURE: 128 MMHG | OXYGEN SATURATION: 96 % | HEART RATE: 71 BPM | TEMPERATURE: 98.6 F

## 2018-09-21 DIAGNOSIS — R42 DIZZINESS: ICD-10-CM

## 2018-09-21 DIAGNOSIS — I10 ESSENTIAL HYPERTENSION: Primary | ICD-10-CM

## 2018-09-21 LAB
ALBUMIN SERPL-MCNC: 4.3 G/DL (ref 3.5–5.2)
ALP BLD-CCNC: 49 U/L (ref 35–104)
ALT SERPL-CCNC: 13 U/L (ref 5–33)
ANION GAP SERPL CALCULATED.3IONS-SCNC: 15 MMOL/L (ref 7–19)
AST SERPL-CCNC: 18 U/L (ref 5–32)
BASOPHILS ABSOLUTE: 0.1 K/UL (ref 0–0.2)
BASOPHILS RELATIVE PERCENT: 0.7 % (ref 0–1)
BILIRUB SERPL-MCNC: 0.5 MG/DL (ref 0.2–1.2)
BUN BLDV-MCNC: 15 MG/DL (ref 8–23)
CALCIUM SERPL-MCNC: 9.8 MG/DL (ref 8.8–10.2)
CHLORIDE BLD-SCNC: 101 MMOL/L (ref 98–111)
CO2: 23 MMOL/L (ref 22–29)
CREAT SERPL-MCNC: 0.8 MG/DL (ref 0.5–0.9)
EOSINOPHILS ABSOLUTE: 0.2 K/UL (ref 0–0.6)
EOSINOPHILS RELATIVE PERCENT: 1.5 % (ref 0–5)
GFR NON-AFRICAN AMERICAN: >60
GLUCOSE BLD-MCNC: 92 MG/DL (ref 74–109)
HCT VFR BLD CALC: 46.1 % (ref 37–47)
HEMOGLOBIN: 15.2 G/DL (ref 12–16)
LYMPHOCYTES ABSOLUTE: 3.9 K/UL (ref 1.1–4.5)
LYMPHOCYTES RELATIVE PERCENT: 39.7 % (ref 20–40)
MCH RBC QN AUTO: 29.3 PG (ref 27–31)
MCHC RBC AUTO-ENTMCNC: 33 G/DL (ref 33–37)
MCV RBC AUTO: 89 FL (ref 81–99)
MONOCYTES ABSOLUTE: 0.6 K/UL (ref 0–0.9)
MONOCYTES RELATIVE PERCENT: 6.3 % (ref 0–10)
NEUTROPHILS ABSOLUTE: 5 K/UL (ref 1.5–7.5)
NEUTROPHILS RELATIVE PERCENT: 51.4 % (ref 50–65)
PDW BLD-RTO: 13.4 % (ref 11.5–14.5)
PLATELET # BLD: 296 K/UL (ref 130–400)
PMV BLD AUTO: 9.1 FL (ref 9.4–12.3)
POTASSIUM SERPL-SCNC: 3.9 MMOL/L (ref 3.5–5)
RBC # BLD: 5.18 M/UL (ref 4.2–5.4)
SODIUM BLD-SCNC: 139 MMOL/L (ref 136–145)
TOTAL PROTEIN: 7.9 G/DL (ref 6.6–8.7)
TROPONIN: <0.01 NG/ML (ref 0–0.03)
WBC # BLD: 9.7 K/UL (ref 4.8–10.8)

## 2018-09-21 PROCEDURE — 70450 CT HEAD/BRAIN W/O DYE: CPT

## 2018-09-21 PROCEDURE — 99284 EMERGENCY DEPT VISIT MOD MDM: CPT

## 2018-09-21 PROCEDURE — 93005 ELECTROCARDIOGRAM TRACING: CPT

## 2018-09-21 PROCEDURE — 99284 EMERGENCY DEPT VISIT MOD MDM: CPT | Performed by: NURSE PRACTITIONER

## 2018-09-21 PROCEDURE — 80053 COMPREHEN METABOLIC PANEL: CPT

## 2018-09-21 PROCEDURE — 36415 COLL VENOUS BLD VENIPUNCTURE: CPT

## 2018-09-21 PROCEDURE — 84484 ASSAY OF TROPONIN QUANT: CPT

## 2018-09-21 PROCEDURE — 85025 COMPLETE CBC W/AUTO DIFF WBC: CPT

## 2018-09-21 NOTE — ED PROVIDER NOTES
[facility]  eMERGENCY dEPARTMENT eNCOUnter      Pt Name: Sandeep Sen  MRN: [id number]  [place] 1950  Date of evaluation: 9/21/2018  Provider: GABBY Peres    CHIEF COMPLAINT       Chief Complaint   Patient presents with    Hypertension     pt presents to ED with c/o hypertension after doing physical therapy sent by Dr. Estuardo Pena  (Location/Symptom, Timing/Onset, Context/Setting, Quality, Duration, Modifying Factors, Severity.)   Sandeep Sen is a 79 y.o. female who presents to the emergency department with complaints of dizziness while laying on the floor. Onset this morning. Patient reports she was at physical therapy this morning, laying on the floor stretching when she became dizzy. She then sat up and the dizziness became worse. She went to Dr. Suresh Temple office and they referred her here. She goes on to tell me that she was recently diagnosed with hypertension and started taking Lisinopril. They have since made a dosage change 3 times. She is now taking 5 mg of Lisinopril. She denies headache, visual disturbances, ear pain, tinnitus, shortness of breath, chest pain or discomfort, nausea, vomiting, abdominal pain, diarrhea, constipation, abnormal urinary symptoms, unsteady gait, or unable to ambulate. HPI    Nursing Notes were reviewed and I agree. REVIEW OF SYSTEMS    (2-9 systems for level 4, 10 or more for level 5)     Review of Systems   Constitutional: Negative for chills and fever. Eyes: Negative for visual disturbance. Respiratory: Negative for shortness of breath and wheezing. Cardiovascular: Negative for chest pain. Gastrointestinal: Negative for abdominal pain, constipation, diarrhea and nausea. Musculoskeletal: Negative for neck pain and neck stiffness. Neurological: Positive for dizziness. Negative for speech difficulty, weakness, numbness and headaches. All other systems reviewed and are negative. Susan December, APRN due to the end of my shift. Awaiting CT results. Amount and/or Complexity of Data Reviewed  Clinical lab tests: ordered and reviewed  Tests in the radiology section of CPT®: ordered      PROCEDURES:    Procedures      FINAL IMPRESSION      1. Essential hypertension    2.  Dizziness          DISPOSITION/PLAN   DISPOSITION Decision To Discharge 09/21/2018 06:02:30 PM      PATIENT REFERRED TO:  Lynda Ortiz MD  [address] [place] [address]  [phone]            DISCHARGE MEDICATIONS:  Discharge Medication List as of 9/21/2018  6:04 PM          (Please note that portions of this note were completed with a voice recognition program.  Efforts were made to edit the dictations but occasionally words are mis-transcribed.)    Anjum Patterson, APRN  09/22/18 [id number]

## 2018-09-22 ASSESSMENT — ENCOUNTER SYMPTOMS
SHORTNESS OF BREATH: 0
DIARRHEA: 0
WHEEZING: 0
ABDOMINAL PAIN: 0
CONSTIPATION: 0
NAUSEA: 0

## 2018-09-24 ENCOUNTER — TELEPHONE (OUTPATIENT)
Dept: CARDIOLOGY | Age: 68
End: 2018-09-24

## 2018-09-24 LAB
EKG P AXIS: 74 DEGREES
EKG P-R INTERVAL: 166 MS
EKG Q-T INTERVAL: 366 MS
EKG QRS DURATION: 78 MS
EKG QTC CALCULATION (BAZETT): 376 MS
EKG T AXIS: 57 DEGREES

## 2018-09-25 NOTE — TELEPHONE ENCOUNTER
Can [facility] see an [facility] at [facility] or do we need to find a spot to double book for soliz? He doesn't feel it is urgent to be seen before her appointment in November but the patient is insisting on coming in.

## 2018-10-02 ENCOUNTER — OFFICE VISIT (OUTPATIENT)
Dept: CARDIOLOGY | Age: 68
End: 2018-10-02
Payer: MEDICARE

## 2018-10-02 VITALS
SYSTOLIC BLOOD PRESSURE: 126 MMHG | DIASTOLIC BLOOD PRESSURE: 78 MMHG | HEART RATE: 62 BPM | BODY MASS INDEX: 27.82 KG/M2 | WEIGHT: 157 LBS | HEIGHT: 63 IN

## 2018-10-02 DIAGNOSIS — I10 ESSENTIAL HYPERTENSION: Primary | ICD-10-CM

## 2018-10-02 DIAGNOSIS — R06.09 DOE (DYSPNEA ON EXERTION): ICD-10-CM

## 2018-10-02 DIAGNOSIS — R07.89 OTHER CHEST PAIN: ICD-10-CM

## 2018-10-02 DIAGNOSIS — R42 DIZZINESS: ICD-10-CM

## 2018-10-02 PROCEDURE — 99214 OFFICE O/P EST MOD 30 MIN: CPT | Performed by: CLINICAL NURSE SPECIALIST

## 2018-10-02 PROCEDURE — G8484 FLU IMMUNIZE NO ADMIN: HCPCS | Performed by: CLINICAL NURSE SPECIALIST

## 2018-10-02 PROCEDURE — 1036F TOBACCO NON-USER: CPT | Performed by: CLINICAL NURSE SPECIALIST

## 2018-10-02 PROCEDURE — G8427 DOCREV CUR MEDS BY ELIG CLIN: HCPCS | Performed by: CLINICAL NURSE SPECIALIST

## 2018-10-02 PROCEDURE — G8419 CALC BMI OUT NRM PARAM NOF/U: HCPCS | Performed by: CLINICAL NURSE SPECIALIST

## 2018-10-02 PROCEDURE — 1101F PT FALLS ASSESS-DOCD LE1/YR: CPT | Performed by: CLINICAL NURSE SPECIALIST

## 2018-10-02 PROCEDURE — 1123F ACP DISCUSS/DSCN MKR DOCD: CPT | Performed by: CLINICAL NURSE SPECIALIST

## 2018-10-02 PROCEDURE — 4040F PNEUMOC VAC/ADMIN/RCVD: CPT | Performed by: CLINICAL NURSE SPECIALIST

## 2018-10-02 PROCEDURE — 1090F PRES/ABSN URINE INCON ASSESS: CPT | Performed by: CLINICAL NURSE SPECIALIST

## 2018-10-02 PROCEDURE — 3017F COLORECTAL CA SCREEN DOC REV: CPT | Performed by: CLINICAL NURSE SPECIALIST

## 2018-10-02 PROCEDURE — G8400 PT W/DXA NO RESULTS DOC: HCPCS | Performed by: CLINICAL NURSE SPECIALIST

## 2018-10-02 RX ORDER — LISINOPRIL 5 MG/1
TABLET ORAL
COMMUNITY
End: 2018-10-02 | Stop reason: ALTCHOICE

## 2018-10-02 RX ORDER — CHLORAL HYDRATE 500 MG
1000 CAPSULE ORAL DAILY
COMMUNITY

## 2018-10-02 NOTE — PATIENT INSTRUCTIONS
Stress test soon- scheduling will call for date and time  Follow up in November With Dr. Sabrina Thakkar   Follow up with Dr Cheryl Garcia for thyroids   Follow up with Dr Jose Holt as planned   Call with any questions or concerns  Follow up with Lei Mccabe MD for non cardiac problems  Report any new problems  Cardiovascular Fitness-Exercise as tolerated. Strive for 15 minutes of exercise most days of the week. Cardiac / Healthy Diet  Continue current medications as directed  Continue plan of treatment  It is always recommended that you bring your medications bottles with you to each visit - this is for your safety!

## 2018-10-02 NOTE — PROGRESS NOTES
Social History   Substance Use Topics    Smoking status: Never Smoker    Smokeless tobacco: Never Used    Alcohol use No      Current Outpatient Prescriptions   Medication Sig Dispense Refill    Misc Natural Products (ADRENAL) 200 MG CAPS Adrenal   3 times daily      Omega-3 Fatty Acids (FISH OIL) 1000 MG CAPS Take 1,000 mg by mouth      levothyroxine (SYNTHROID) 25 MCG tablet Take 37.5 mcg by mouth Daily Takes 1.5 tablets once daily      Magnesium 500 MG CAPS Take 500 mg by mouth daily      lisinopril (PRINIVIL;ZESTRIL) 5 MG tablet Take 1 tablet by mouth daily 30 tablet 3    thyroid (ARMOUR) 65 MG tablet Take 65 mg by mouth daily      dicyclomine (BENTYL) 20 MG tablet Take 1 tablet by mouth 3 times daily (before meals) 90 tablet 11    NALTREXONE HCL PO Take 4.5 mg by mouth      NONFORMULARY       Cholecalciferol (VITAMIN D3) LIQD by Does not apply route daily       NONFORMULARY 3 times daily      NONFORMULARY 3 times daily      NONFORMULARY 200 mg daily      NONFORMULARY 5 mg daily      NONFORMULARY       NONFORMULARY       NONFORMULARY       ranitidine (ZANTAC) 300 MG tablet Take 300 mg by mouth 2 times daily      Nutritional Supplements (DHEA PO) Take 5 mg by mouth       nitrofurantoin (MACRODANTIN) 100 MG capsule Take 100 mg by mouth as needed       No current facility-administered medications for this visit. Allergies: Demerol hcl [meperidine]; Statins; and Morphine    Review of Systems  Constitutional - + significant activity change. No appetite change, or unexpected weight change. No fever, chills or diaphoresis. + fatigue. HEENT - no significant rhinorrhea or epistaxis. No tinnitus or significant hearing loss. Eyes - no sudden vision change or amaurosis. Respiratory - no significant wheezing, stridor, apnea or cough. + dyspnea on exertion. No resting shortness of breath. Cardiovascular - + chest pain, no orthopnea or PND.   No sensation of arrhythmia or slow heart

## 2018-10-10 LAB
ALDOST SERPL-MCNC: 14.8 NG/DL (ref 0–30)
ANION GAP SERPL CALCULATED.3IONS-SCNC: 15 MMOL/L (ref 10–18)
APPEARANCE UR: CLEAR
BACTERIA #/AREA URNS HPF: NORMAL /[HPF]
BILIRUB UR QL STRIP: NEGATIVE
BUN SERPL-MCNC: 18 MG/DL (ref 8–27)
BUN/CREAT SERPL: 17 (ref 12–28)
CHLORIDE SERPL-SCNC: 99 MMOL/L (ref 96–106)
CO2 SERPL-SCNC: 23 MMOL/L (ref 20–29)
COLOR UR: YELLOW
CREAT SERPL-MCNC: 1.03 MG/DL (ref 0.57–1)
EPI CELLS #/AREA URNS HPF: NORMAL /HPF
GLUCOSE SERPL-MCNC: 92 MG/DL (ref 65–99)
GLUCOSE UR QL: NEGATIVE
HGB UR QL STRIP: NEGATIVE
KETONES UR QL STRIP: NEGATIVE
LEUKOCYTE ESTERASE UR QL STRIP: (no result)
MICRO URNS: (no result)
MUCOUS THREADS URNS QL MICRO: PRESENT
NITRITE UR QL STRIP: NEGATIVE
PH UR STRIP: 6.5 [PH] (ref 5–7.5)
POTASSIUM SERPL-SCNC: 4.6 MMOL/L (ref 3.5–5.2)
PROT UR QL STRIP: NEGATIVE
RBC #/AREA URNS HPF: NORMAL /HPF
RENIN PLAS-CCNC: 5.32 NG/ML/HR (ref 0.17–5.38)
SODIUM SERPL-SCNC: 137 MMOL/L (ref 134–144)
SP GR UR: 1.01 (ref 1–1.03)
TSH SERPL-ACNC: 2.2 UU/ML
UROBILINOGEN UR STRIP-MCNC: 0.2 MG/DL (ref 0.2–1)
WBC #/AREA URNS HPF: NORMAL /HPF

## 2018-10-15 ENCOUNTER — TRANSCRIBE ORDERS (OUTPATIENT)
Dept: ADMINISTRATIVE | Facility: HOSPITAL | Age: 68
End: 2018-10-15

## 2018-10-15 DIAGNOSIS — I10 ESSENTIAL HYPERTENSION: Primary | ICD-10-CM

## 2018-10-19 ENCOUNTER — HOSPITAL ENCOUNTER (OUTPATIENT)
Dept: ULTRASOUND IMAGING | Facility: HOSPITAL | Age: 68
Discharge: HOME | End: 2018-10-19
Admitting: INTERNAL MEDICINE

## 2018-10-19 DIAGNOSIS — I10 ESSENTIAL HYPERTENSION: ICD-10-CM

## 2018-10-19 PROCEDURE — 93975 VASCULAR STUDY: CPT

## 2018-10-19 PROCEDURE — 76775 US EXAM ABDO BACK WALL LIM: CPT

## 2018-10-24 ENCOUNTER — HOSPITAL ENCOUNTER (OUTPATIENT)
Dept: NON INVASIVE DIAGNOSTICS | Age: 68
Discharge: HOME | End: 2018-10-24
Payer: MEDICARE

## 2018-10-24 DIAGNOSIS — I10 ESSENTIAL HYPERTENSION: ICD-10-CM

## 2018-10-24 DIAGNOSIS — R07.89 OTHER CHEST PAIN: ICD-10-CM

## 2018-10-24 DIAGNOSIS — R06.09 DOE (DYSPNEA ON EXERTION): ICD-10-CM

## 2018-10-24 LAB
LV EF: 50 %
LVEF MODALITY: NORMAL

## 2018-10-24 PROCEDURE — 93350 STRESS TTE ONLY: CPT

## 2018-11-09 ENCOUNTER — OFFICE VISIT (OUTPATIENT)
Dept: CARDIOLOGY | Age: 68
End: 2018-11-09
Payer: MEDICARE

## 2018-11-09 VITALS
SYSTOLIC BLOOD PRESSURE: 124 MMHG | DIASTOLIC BLOOD PRESSURE: 82 MMHG | WEIGHT: 158 LBS | HEART RATE: 70 BPM | HEIGHT: 62 IN | BODY MASS INDEX: 29.08 KG/M2

## 2018-11-09 DIAGNOSIS — G90.9 AUTONOMIC DYSFUNCTION: ICD-10-CM

## 2018-11-09 DIAGNOSIS — R42 DIZZINESS: ICD-10-CM

## 2018-11-09 DIAGNOSIS — R55 NEAR SYNCOPE: Primary | ICD-10-CM

## 2018-11-09 DIAGNOSIS — I10 ESSENTIAL HYPERTENSION: ICD-10-CM

## 2018-11-09 PROCEDURE — G8400 PT W/DXA NO RESULTS DOC: HCPCS | Performed by: INTERNAL MEDICINE

## 2018-11-09 PROCEDURE — 1090F PRES/ABSN URINE INCON ASSESS: CPT | Performed by: INTERNAL MEDICINE

## 2018-11-09 PROCEDURE — 93000 ELECTROCARDIOGRAM COMPLETE: CPT | Performed by: INTERNAL MEDICINE

## 2018-11-09 PROCEDURE — 1036F TOBACCO NON-USER: CPT | Performed by: INTERNAL MEDICINE

## 2018-11-09 PROCEDURE — G8419 CALC BMI OUT NRM PARAM NOF/U: HCPCS | Performed by: INTERNAL MEDICINE

## 2018-11-09 PROCEDURE — 3017F COLORECTAL CA SCREEN DOC REV: CPT | Performed by: INTERNAL MEDICINE

## 2018-11-09 PROCEDURE — G8427 DOCREV CUR MEDS BY ELIG CLIN: HCPCS | Performed by: INTERNAL MEDICINE

## 2018-11-09 PROCEDURE — 99213 OFFICE O/P EST LOW 20 MIN: CPT | Performed by: INTERNAL MEDICINE

## 2018-11-09 PROCEDURE — 1101F PT FALLS ASSESS-DOCD LE1/YR: CPT | Performed by: INTERNAL MEDICINE

## 2018-11-09 PROCEDURE — 4040F PNEUMOC VAC/ADMIN/RCVD: CPT | Performed by: INTERNAL MEDICINE

## 2018-11-09 PROCEDURE — G8484 FLU IMMUNIZE NO ADMIN: HCPCS | Performed by: INTERNAL MEDICINE

## 2018-11-09 PROCEDURE — 1123F ACP DISCUSS/DSCN MKR DOCD: CPT | Performed by: INTERNAL MEDICINE

## 2018-11-30 ENCOUNTER — HOSPITAL ENCOUNTER (OUTPATIENT)
Dept: NON INVASIVE DIAGNOSTICS | Age: 68
Discharge: HOME | End: 2018-11-30
Payer: MEDICARE

## 2018-11-30 DIAGNOSIS — R55 NEAR SYNCOPE: ICD-10-CM

## 2018-11-30 DIAGNOSIS — G90.9 AUTONOMIC DYSFUNCTION: ICD-10-CM

## 2018-11-30 DIAGNOSIS — R42 DIZZINESS: ICD-10-CM

## 2018-11-30 PROCEDURE — 93660 TILT TABLE EVALUATION: CPT

## 2018-12-07 ENCOUNTER — OFFICE VISIT (OUTPATIENT)
Dept: CARDIOLOGY | Age: 68
End: 2018-12-07
Payer: MEDICARE

## 2018-12-07 VITALS
SYSTOLIC BLOOD PRESSURE: 156 MMHG | OXYGEN SATURATION: 95 % | HEART RATE: 83 BPM | DIASTOLIC BLOOD PRESSURE: 98 MMHG | BODY MASS INDEX: 29.59 KG/M2 | WEIGHT: 160.8 LBS | HEIGHT: 62 IN

## 2018-12-07 DIAGNOSIS — G90.9 AUTONOMIC DYSFUNCTION: Primary | ICD-10-CM

## 2018-12-07 DIAGNOSIS — I10 ESSENTIAL HYPERTENSION: ICD-10-CM

## 2018-12-07 DIAGNOSIS — R42 DIZZINESS: ICD-10-CM

## 2018-12-07 PROCEDURE — 1090F PRES/ABSN URINE INCON ASSESS: CPT | Performed by: INTERNAL MEDICINE

## 2018-12-07 PROCEDURE — G8427 DOCREV CUR MEDS BY ELIG CLIN: HCPCS | Performed by: INTERNAL MEDICINE

## 2018-12-07 PROCEDURE — 1123F ACP DISCUSS/DSCN MKR DOCD: CPT | Performed by: INTERNAL MEDICINE

## 2018-12-07 PROCEDURE — 3017F COLORECTAL CA SCREEN DOC REV: CPT | Performed by: INTERNAL MEDICINE

## 2018-12-07 PROCEDURE — G8400 PT W/DXA NO RESULTS DOC: HCPCS | Performed by: INTERNAL MEDICINE

## 2018-12-07 PROCEDURE — 1101F PT FALLS ASSESS-DOCD LE1/YR: CPT | Performed by: INTERNAL MEDICINE

## 2018-12-07 PROCEDURE — 1036F TOBACCO NON-USER: CPT | Performed by: INTERNAL MEDICINE

## 2018-12-07 PROCEDURE — 93000 ELECTROCARDIOGRAM COMPLETE: CPT | Performed by: INTERNAL MEDICINE

## 2018-12-07 PROCEDURE — G8484 FLU IMMUNIZE NO ADMIN: HCPCS | Performed by: INTERNAL MEDICINE

## 2018-12-07 PROCEDURE — 99212 OFFICE O/P EST SF 10 MIN: CPT | Performed by: INTERNAL MEDICINE

## 2018-12-07 PROCEDURE — 4040F PNEUMOC VAC/ADMIN/RCVD: CPT | Performed by: INTERNAL MEDICINE

## 2018-12-07 PROCEDURE — G8419 CALC BMI OUT NRM PARAM NOF/U: HCPCS | Performed by: INTERNAL MEDICINE

## 2018-12-26 DIAGNOSIS — I10 ESSENTIAL HYPERTENSION: Primary | ICD-10-CM

## 2018-12-26 RX ORDER — LISINOPRIL 5 MG/1
5 TABLET ORAL DAILY
Qty: 30 TABLET | Refills: 3 | Status: SHIPPED | OUTPATIENT
Start: 2018-12-26 | End: 2019-02-07 | Stop reason: SDUPTHER

## 2019-02-07 DIAGNOSIS — I10 ESSENTIAL HYPERTENSION: ICD-10-CM

## 2019-02-07 RX ORDER — LISINOPRIL 5 MG/1
5 TABLET ORAL DAILY
Qty: 30 TABLET | Refills: 3 | Status: SHIPPED | OUTPATIENT
Start: 2019-02-07 | End: 2019-07-29 | Stop reason: SDUPTHER

## 2019-02-14 ENCOUNTER — OFFICE VISIT (OUTPATIENT)
Dept: CARDIOLOGY | Age: 69
End: 2019-02-14
Payer: MEDICARE

## 2019-02-14 VITALS
WEIGHT: 161 LBS | BODY MASS INDEX: 29.63 KG/M2 | SYSTOLIC BLOOD PRESSURE: 136 MMHG | DIASTOLIC BLOOD PRESSURE: 82 MMHG | HEIGHT: 62 IN | HEART RATE: 75 BPM

## 2019-02-14 DIAGNOSIS — R42 DIZZINESS: ICD-10-CM

## 2019-02-14 DIAGNOSIS — R53.83 OTHER FATIGUE: ICD-10-CM

## 2019-02-14 DIAGNOSIS — I10 ESSENTIAL HYPERTENSION: ICD-10-CM

## 2019-02-14 DIAGNOSIS — R55 NEAR SYNCOPE: Primary | ICD-10-CM

## 2019-02-14 PROCEDURE — 93000 ELECTROCARDIOGRAM COMPLETE: CPT | Performed by: INTERNAL MEDICINE

## 2019-02-14 PROCEDURE — 3017F COLORECTAL CA SCREEN DOC REV: CPT | Performed by: INTERNAL MEDICINE

## 2019-02-14 PROCEDURE — G8484 FLU IMMUNIZE NO ADMIN: HCPCS | Performed by: INTERNAL MEDICINE

## 2019-02-14 PROCEDURE — G8427 DOCREV CUR MEDS BY ELIG CLIN: HCPCS | Performed by: INTERNAL MEDICINE

## 2019-02-14 PROCEDURE — G8419 CALC BMI OUT NRM PARAM NOF/U: HCPCS | Performed by: INTERNAL MEDICINE

## 2019-02-14 PROCEDURE — 99214 OFFICE O/P EST MOD 30 MIN: CPT | Performed by: INTERNAL MEDICINE

## 2019-02-14 PROCEDURE — 1090F PRES/ABSN URINE INCON ASSESS: CPT | Performed by: INTERNAL MEDICINE

## 2019-02-14 PROCEDURE — G8400 PT W/DXA NO RESULTS DOC: HCPCS | Performed by: INTERNAL MEDICINE

## 2019-02-14 PROCEDURE — 1101F PT FALLS ASSESS-DOCD LE1/YR: CPT | Performed by: INTERNAL MEDICINE

## 2019-02-14 PROCEDURE — 1036F TOBACCO NON-USER: CPT | Performed by: INTERNAL MEDICINE

## 2019-02-14 PROCEDURE — 4040F PNEUMOC VAC/ADMIN/RCVD: CPT | Performed by: INTERNAL MEDICINE

## 2019-02-14 PROCEDURE — 1123F ACP DISCUSS/DSCN MKR DOCD: CPT | Performed by: INTERNAL MEDICINE

## 2019-02-14 ASSESSMENT — ENCOUNTER SYMPTOMS
BLOOD IN STOOL: 0
SHORTNESS OF BREATH: 1
ABDOMINAL DISTENTION: 0
CHEST TIGHTNESS: 0
CHOKING: 0
APNEA: 0
WHEEZING: 0
COUGH: 0
BACK PAIN: 1
NAUSEA: 0

## 2019-02-25 ENCOUNTER — TELEPHONE (OUTPATIENT)
Dept: CARDIOLOGY | Age: 69
End: 2019-02-25

## 2019-04-09 ENCOUNTER — OFFICE VISIT (OUTPATIENT)
Dept: GASTROENTEROLOGY | Age: 69
End: 2019-04-09
Payer: MEDICARE

## 2019-04-09 VITALS
HEIGHT: 63 IN | OXYGEN SATURATION: 98 % | HEART RATE: 70 BPM | WEIGHT: 160 LBS | BODY MASS INDEX: 28.35 KG/M2 | DIASTOLIC BLOOD PRESSURE: 77 MMHG | SYSTOLIC BLOOD PRESSURE: 120 MMHG

## 2019-04-09 DIAGNOSIS — R10.32 BILATERAL LOWER ABDOMINAL CRAMPING: ICD-10-CM

## 2019-04-09 DIAGNOSIS — R11.0 NAUSEA: Primary | ICD-10-CM

## 2019-04-09 DIAGNOSIS — R10.31 BILATERAL LOWER ABDOMINAL CRAMPING: ICD-10-CM

## 2019-04-09 DIAGNOSIS — R19.7 DIARRHEA, UNSPECIFIED TYPE: ICD-10-CM

## 2019-04-09 PROCEDURE — G8400 PT W/DXA NO RESULTS DOC: HCPCS | Performed by: NURSE PRACTITIONER

## 2019-04-09 PROCEDURE — G8419 CALC BMI OUT NRM PARAM NOF/U: HCPCS | Performed by: NURSE PRACTITIONER

## 2019-04-09 PROCEDURE — 1123F ACP DISCUSS/DSCN MKR DOCD: CPT | Performed by: NURSE PRACTITIONER

## 2019-04-09 PROCEDURE — G8427 DOCREV CUR MEDS BY ELIG CLIN: HCPCS | Performed by: NURSE PRACTITIONER

## 2019-04-09 PROCEDURE — 1090F PRES/ABSN URINE INCON ASSESS: CPT | Performed by: NURSE PRACTITIONER

## 2019-04-09 PROCEDURE — 3017F COLORECTAL CA SCREEN DOC REV: CPT | Performed by: NURSE PRACTITIONER

## 2019-04-09 PROCEDURE — 1036F TOBACCO NON-USER: CPT | Performed by: NURSE PRACTITIONER

## 2019-04-09 PROCEDURE — 4040F PNEUMOC VAC/ADMIN/RCVD: CPT | Performed by: NURSE PRACTITIONER

## 2019-04-09 PROCEDURE — 99213 OFFICE O/P EST LOW 20 MIN: CPT | Performed by: NURSE PRACTITIONER

## 2019-04-09 RX ORDER — DICYCLOMINE HCL 20 MG
TABLET ORAL
Qty: 90 TABLET | Refills: 11 | Status: SHIPPED | OUTPATIENT
Start: 2019-04-09 | End: 2020-02-24 | Stop reason: ALTCHOICE

## 2019-04-09 RX ORDER — ONDANSETRON 4 MG/1
4 TABLET, ORALLY DISINTEGRATING ORAL DAILY PRN
Qty: 30 TABLET | Refills: 5 | Status: SHIPPED | OUTPATIENT
Start: 2019-04-09 | End: 2020-02-24 | Stop reason: ALTCHOICE

## 2019-04-09 ASSESSMENT — ENCOUNTER SYMPTOMS
BLOOD IN STOOL: 0
ABDOMINAL DISTENTION: 0
SHORTNESS OF BREATH: 0
DIARRHEA: 1
COUGH: 0
NAUSEA: 1
CONSTIPATION: 0
RECTAL PAIN: 0
ABDOMINAL PAIN: 0
BACK PAIN: 1
VOICE CHANGE: 0
VOMITING: 0
TROUBLE SWALLOWING: 0
ANAL BLEEDING: 0

## 2019-04-09 NOTE — PATIENT INSTRUCTIONS
Follow up if not improved with the Zofran. Otherwise will see you back in 1 year for medication refills, or sooner if needed.

## 2019-04-09 NOTE — PROGRESS NOTES
Subjective:      Julia Caldwell is a81 y.o. female  Chief Complaint   Patient presents with    Annual Exam     medication refill       HPI  PCP: Zack Talavera MD  Pt is here for annual appt for med refills for Bentyl TID for loose stools and abdominal cramping. This helps the cramping for sure. Also helps the loose stools but occasionally will have a loose stool every now and then. No other lower GI complaints. C/o nausea that occurs almost every morning. GB is intact, we discussed GB testing, she defers. She thinks her nausea is r/t some of her daily medications she is on. She doesn't want any evaluation for this, just wants meds to use for prn use. OV note 4/2018:  Pt is here for 8 week f/u to discuss med efficay of bentyl 20mg TID. She hasn't started it yet. Tried lactose free diet for a week and align daily for 6 weeks with no improvement in symptoms. Still having 3-4 loose stools a day and lower abd cramping. Plans to start the Bentyl tonight.     Today she mentions she occasionally will wake up from her sleep with the sensation of a \"band around my chest.\"  This has been occurring for many years, and occurs sporadically, maybe once a month or less. When this occurs she takes a TUMs and eats a cracker and this sensation goes away. Reports she has a cardiologist and sees him regularly and this is not cardiac in nature.        EGD 2/5/2018 ([facility])- (anemia)- normal  (1) marcello negative  (2) Small intestine, duodenal biopsy: Benign duodenal mucosa  demonstrating mild chronic inflammation, negative for obvious evidence of  sprue.     Colonoscopy 2/5/2018 ([facility])- (anemia, loose stools, abd pain)- polyps; 5 yr recall  (1) Colon, cecal polypectomy: Tubular adenoma, negative for evidence of  high-grade dysplasia. (2) Colon, sigmoid polypectomies x2: Benign hyperplastic polyps.   (3)  Colon, random colonic biopsies: Benign colonic mucosa, negative for  evidence of active or microscopic colitis or dysplasia.        Family HX:  Sister had colon polyps, brother had gastric cancer  Pt denies family hx of colon CA, inflammatory bowel dx, and esophageal CA.     Past Medical History:   Diagnosis Date    Adrenal insufficiency (HCC)     CFS (chronic fatigue syndrome)     GERD (gastroesophageal reflux disease)     Hypertension     Kidney disease     third stage    Thyroid condition           Past Surgical History:   Procedure Laterality Date    APPENDECTOMY      CARDIAC CATHETERIZATION       SECTION      x2    COLONOSCOPY      COLONOSCOPY N/A 2018    Dr Huyen Muñiz AP (-) dysplasia x 1, HP x 2, BCM x 1--5 yr recall    HYSTERECTOMY      NECK SURGERY      OVARY REMOVAL      DC EGD TRANSORAL BIOPSY SINGLE/MULTIPLE N/A 2018    Dr Manfred Denney (-)    UPPER GASTROINTESTINAL ENDOSCOPY         Social History     Socioeconomic History    Marital status:      Spouse name: None    Number of children: None    Years of education: None    Highest education level: None   Occupational History    None   Social Needs    Financial resource strain: None    Food insecurity:     Worry: None     Inability: None    Transportation needs:     Medical: None     Non-medical: None   Tobacco Use    Smoking status: Never Smoker    Smokeless tobacco: Never Used   Substance and Sexual Activity    Alcohol use: No    Drug use: No    Sexual activity: None   Lifestyle    Physical activity:     Days per week: None     Minutes per session: None    Stress: None   Relationships    Social connections:     Talks on phone: None     Gets together: None     Attends Christianity service: None     Active member of club or organization: None     Attends meetings of clubs or organizations: None     Relationship status: None    Intimate partner violence:     Fear of current or ex partner: None     Emotionally abused: None     Physically abused: None     Forced sexual activity: None   Other Topics Concern    None Social History Narrative    None       Allergies   Allergen Reactions    Demerol Hcl [Meperidine] Anaphylaxis    Statins Other (See Comments)     Jaws lock up    Morphine Nausea And Vomiting and Rash       Current Outpatient Medications   Medication Sig Dispense Refill    dicyclomine (BENTYL) 20 MG tablet TAKE 1 TABLET BY MOUTH THREE TIMES DAILY BEFORE MEAL(S) 90 tablet 11    ondansetron (ZOFRAN ODT) 4 MG disintegrating tablet Take 1 tablet by mouth daily as needed for Nausea 30 tablet 5    lisinopril (PRINIVIL;ZESTRIL) 5 MG tablet Take 1 tablet by mouth daily 30 tablet 3    Misc Natural Products (ADRENAL) 200 MG CAPS Take 200 mg by mouth 3 times daily      Omega-3 Fatty Acids (FISH OIL) 1000 MG CAPS Take 1,000 mg by mouth daily       levothyroxine (SYNTHROID) 25 MCG tablet Takes 1.5 tablets daily      Magnesium 500 MG CAPS Take 500 mg by mouth daily      thyroid (ARMOUR) 65 MG tablet Take 90 mg by mouth daily       NALTREXONE HCL PO Take 4.5 mg by mouth daily       NONFORMULARY       NONFORMULARY 3 times daily      NONFORMULARY 200 mg daily      NONFORMULARY 5 mg daily      NONFORMULARY Take by mouth daily       NONFORMULARY Take by mouth daily       NONFORMULARY Take by mouth 2 times daily       ranitidine (ZANTAC) 300 MG tablet Take 300 mg by mouth daily       Nutritional Supplements (DHEA PO) Take 5 mg by mouth daily       nitrofurantoin (MACRODANTIN) 100 MG capsule Take 100 mg by mouth as needed      Cholecalciferol (VITAMIN D3) LIQD Take 6 drops daily       No current facility-administered medications for this visit. Review of Systems   Constitutional: Positive for fatigue. Negative for unexpected weight change. HENT: Negative for trouble swallowing and voice change. Respiratory: Negative for cough and shortness of breath. Cardiovascular: Negative for chest pain and palpitations. Gastrointestinal: Positive for diarrhea (rare) and nausea.  Negative for abdominal distention, abdominal pain, anal bleeding, blood in stool, constipation, rectal pain and vomiting. Genitourinary: Negative for hematuria. Musculoskeletal: Positive for back pain. Negative for arthralgias and neck pain. Neurological: Negative for weakness and headaches. Psychiatric/Behavioral: Negative for dysphoric mood. The patient is not nervous/anxious. Objective:     Physical Exam   Constitutional: She is oriented to person, place, and time. She appears well-developed and well-nourished. /77   Pulse 70   Ht 5' 2.5\" (1.588 m)   Wt 160 lb (72.6 kg)   SpO2 98%   BMI 28.80 kg/m²    Eyes: Pupils are equal, round, and reactive to light. Conjunctivae and EOM are normal. No scleral icterus. Cardiovascular: Normal rate, regular rhythm and normal heart sounds. Exam reveals no gallop and no friction rub. No murmur heard. Pulmonary/Chest: Effort normal and breath sounds normal. No respiratory distress. Abdominal: Soft. Bowel sounds are normal. She exhibits no distension. There is no tenderness. There is no rebound. Neurological: She is alert and oriented to person, place, and time. No cranial nerve deficit. Psychiatric: She has a normal mood and affect. Judgment normal.   Nursing note and vitals reviewed. Assessment:       Diagnosis Orders   1. Nausea  ondansetron (ZOFRAN ODT) 4 MG disintegrating tablet   2. Diarrhea, unspecified type  dicyclomine (BENTYL) 20 MG tablet   3. Bilateral lower abdominal cramping  dicyclomine (BENTYL) 20 MG tablet         Plan:      1. zofran escribed for daily prn use. Offered her a f/u appt, she defers at this time  2. Refilled her bentyl  3.  F/u in 1 year for med refills, or sooner if needed

## 2019-07-01 ENCOUNTER — TRANSCRIBE ORDERS (OUTPATIENT)
Dept: ADMINISTRATIVE | Facility: HOSPITAL | Age: 69
End: 2019-07-01

## 2019-07-01 DIAGNOSIS — Z12.39 SCREENING BREAST EXAMINATION: Primary | ICD-10-CM

## 2019-07-03 ENCOUNTER — TELEPHONE (OUTPATIENT)
Dept: CARDIOLOGY | Age: 69
End: 2019-07-03

## 2019-07-03 NOTE — TELEPHONE ENCOUNTER
left message on patient phone regarding patient being put in CHF spot.  Patient is needing to be in different spot for different day

## 2019-07-05 ENCOUNTER — HOSPITAL ENCOUNTER (OUTPATIENT)
Dept: MAMMOGRAPHY | Facility: HOSPITAL | Age: 69
Discharge: HOME | End: 2019-07-05
Admitting: OBSTETRICS & GYNECOLOGY

## 2019-07-05 PROCEDURE — 77063 BREAST TOMOSYNTHESIS BI: CPT

## 2019-07-05 PROCEDURE — 77067 SCR MAMMO BI INCL CAD: CPT

## 2019-07-29 DIAGNOSIS — I10 ESSENTIAL HYPERTENSION: ICD-10-CM

## 2019-07-29 RX ORDER — LISINOPRIL 5 MG/1
5 TABLET ORAL DAILY
Qty: 30 TABLET | Refills: 3 | Status: SHIPPED
Start: 2019-07-29 | End: 2020-02-24

## 2019-09-30 ENCOUNTER — OFFICE VISIT (OUTPATIENT)
Dept: CARDIOLOGY | Age: 69
End: 2019-09-30
Payer: MEDICARE

## 2019-09-30 VITALS
WEIGHT: 160 LBS | SYSTOLIC BLOOD PRESSURE: 170 MMHG | BODY MASS INDEX: 28.35 KG/M2 | HEART RATE: 62 BPM | DIASTOLIC BLOOD PRESSURE: 120 MMHG | HEIGHT: 63 IN

## 2019-09-30 DIAGNOSIS — G90.9 AUTONOMIC DYSFUNCTION: Primary | ICD-10-CM

## 2019-09-30 DIAGNOSIS — I10 ESSENTIAL HYPERTENSION: ICD-10-CM

## 2019-09-30 DIAGNOSIS — R55 SYNCOPE, UNSPECIFIED SYNCOPE TYPE: ICD-10-CM

## 2019-09-30 PROCEDURE — 1090F PRES/ABSN URINE INCON ASSESS: CPT | Performed by: NURSE PRACTITIONER

## 2019-09-30 PROCEDURE — 3017F COLORECTAL CA SCREEN DOC REV: CPT | Performed by: NURSE PRACTITIONER

## 2019-09-30 PROCEDURE — 1036F TOBACCO NON-USER: CPT | Performed by: NURSE PRACTITIONER

## 2019-09-30 PROCEDURE — 99214 OFFICE O/P EST MOD 30 MIN: CPT | Performed by: NURSE PRACTITIONER

## 2019-09-30 PROCEDURE — G8427 DOCREV CUR MEDS BY ELIG CLIN: HCPCS | Performed by: NURSE PRACTITIONER

## 2019-09-30 PROCEDURE — 4040F PNEUMOC VAC/ADMIN/RCVD: CPT | Performed by: NURSE PRACTITIONER

## 2019-09-30 PROCEDURE — G8400 PT W/DXA NO RESULTS DOC: HCPCS | Performed by: NURSE PRACTITIONER

## 2019-09-30 PROCEDURE — G8419 CALC BMI OUT NRM PARAM NOF/U: HCPCS | Performed by: NURSE PRACTITIONER

## 2019-09-30 PROCEDURE — 1123F ACP DISCUSS/DSCN MKR DOCD: CPT | Performed by: NURSE PRACTITIONER

## 2019-09-30 NOTE — PATIENT INSTRUCTIONS
cardiovascular disease. When you eat a heart healthy diet, you improve your chances for feeling good and staying healthy for life. 6)  Lose weight - when you shed extra fat an unnecessary pounds, you reduce the burden on your hear, lungs, blood vessels and skeleton. You give yourself the gift of active living, you lower your blood pressure and help yourself feel better. 7) Stop smoking - cigarette smokers have a higher risk of developing cardiovascular disease. If  You smoke, quitting is the best thing you can do for your health. Check American Heart Association on line for more information on Life's Simple 7 and tips for healthy living. Patient Education        Orthostatic Hypotension: Care Instructions  Your Care Instructions    Orthostatic hypotension is a quick drop in blood pressure. It happens when you get up from sitting or lying down. You may feel faint, lightheaded, or dizzy. When a person sits up or stands up, the body changes the way it pumps blood. This can slow the flow of blood to the brain for a very short time. And that can make you feel lightheaded. Many medicines can cause this problem, especially in older people. Lack of fluids (dehydration) or illnesses such as diabetes or heart disease also can cause it. Follow-up care is a key part of your treatment and safety. Be sure to make and go to all appointments, and call your doctor if you are having problems. It's also a good idea to know your test results and keep a list of the medicines you take. How can you care for yourself at home? · Tell your doctor about any problems you have with your medicines. · If your doctor prescribes medicine to help prevent a low blood pressure problem, take it exactly as prescribed. Call your doctor if you think you are having a problem with your medicine. · Drink plenty of fluids, enough so that your urine is light yellow or clear like water. Choose water and other caffeine-free clear liquids.  If link.  Current as of: July 22, 2018  Content Version: 12.1  © 6011-9387 [employer], [employer]. Care instructions adapted under license by [facility] ([facility]). If you have questions about a medical condition or this instruction, always ask your healthcare professional. [address] any warranty or liability for your use of this information.

## 2019-09-30 NOTE — PROGRESS NOTES
R10.31, R10.32    Loose stools R19.5    Cervicalgia M54.2    Chronic fatigue syndrome R53.82    Chronic renal failure N18.9    Essential hypertension I10    Hypothyroidism due to Hashimoto's thyroiditis E03.8, E06.3    Lumbosacral spondylosis without myelopathy M47.817    Near syncope R55    Spondylosis of cervical region without myelopathy or radiculopathy M47.812    Nausea R11.0    Autonomic dysfunction G90.9     Past Medical History:   Diagnosis Date    Adrenal insufficiency (HCC)     Autonomic nervous system disease or syndrome     CFS (chronic fatigue syndrome)     GERD (gastroesophageal reflux disease)     Hypertension     Kidney disease     third stage    Thyroid condition      Past Surgical History:   Procedure Laterality Date    APPENDECTOMY      CARDIAC CATHETERIZATION       SECTION      x2    COLONOSCOPY      COLONOSCOPY N/A 2018    Dr Riley Damian AP (-) dysplasia x 1, HP x 2, BCM x 1--5 yr recall    HYSTERECTOMY      NECK SURGERY      OVARY REMOVAL      CT EGD TRANSORAL BIOPSY SINGLE/MULTIPLE N/A 2018    Dr Yesenia Monreal- [place] (-)    UPPER GASTROINTESTINAL ENDOSCOPY       Family History   Problem Relation Age of Onset    Colon Polyps Sister     Stomach Cancer Brother     Coronary Art Dis Mother     Dementia Father     Colon Cancer Niece     Liver Cancer Neg Hx     Liver Disease Neg Hx     Esophageal Cancer Neg Hx     Rectal Cancer Neg Hx      Social History     Tobacco Use    Smoking status: Never Smoker    Smokeless tobacco: Never Used   Substance Use Topics    Alcohol use: No      Current Outpatient Medications   Medication Sig Dispense Refill    lisinopril (PRINIVIL;ZESTRIL) 5 MG tablet Take 1 tablet by mouth daily 30 tablet 3    dicyclomine (BENTYL) 20 MG tablet TAKE 1 TABLET BY MOUTH THREE TIMES DAILY BEFORE MEAL(S) 90 tablet 11    ondansetron (ZOFRAN ODT) 4 MG disintegrating tablet Take 1 tablet by mouth daily as needed for Nausea 30 tablet no dysuria, frequency, or urgency. No flank pain or hematuria. Musculoskeletal - no back pain or myalgia. No problems with gait. Extremities - no clubbing, cyanosis or extremity edema. Skin - no color change or rash. No pallor. No new surgical incision. Neurologic - no speech difficulty, facial asymmetry or lateralizing weakness. No seizures. No significant dizziness. + syncope and near syncope with hx of autonomic dysfunction. Hematologic - no easy bruising or excessive bleeding. Psychiatric - no severe anxiety or insomnia. No confusion. All other review of systems are negative. Objective  Vital Signs - BP (!) 160/90   Pulse 62   Ht 5' 2.5\" (1.588 m)   Wt 160 lb (72.6 kg)   BMI 28.80 kg/m²   General - Virl Copper is alert, cooperative, and pleasant. Well groomed. No acute distress. Body habitus - Body mass index is 28.8 kg/m². HEENT - Head is normocephalic. No circumoral cyanosis. Dentition is normal.  EYES -   Lids normal without ptosis. No discharge, edema or subconjunctival hemorrhage. Neck - Symmetrical without apparent mass or lymphadenopathy. Respiratory - Normal respiratory effort without use of accessory muscles. Ausculatation reveals vesicular breath sounds without crackles, wheezes, rub or rhonchi. Cardiovascular - No jugular venous distention. Auscultation reveals regular rate and rhythm. No audible clicks, gallop or rub. No murmur. No lower extremity varicosities. No carotid bruits. Abdominal -  No visible distention, mass or pulsations. Extremities - No clubbing or cyanosis. No statis dermatitis or ulcers. No edema. Musculoskeletal -   No Osler's nodes. No kyphosis or scoliosis. Gait is even and regular without limp or shuffle. Ambulates without assistance. Skin -  Warm and dry; no rash or pallor. No new surgical wound. Neurological - No focal neurological deficits. Thought processes coherent. No apparent tremor.    Oriented to person, place and time.    Psychiatric -  Appropriate affect and mood. Assessment:     Diagnosis Orders   1. Autonomic dysfunction     2. Essential hypertension     3. Syncope, unspecified syncope type       Data reviewed:  11/30/18  Conclusions:  The 45 minutes of heads up right tilt table testing   showed:  1.  Patient experienced brief episode of dizziness without   symptoms of syncope  2. Fluctuations in blood pressure noted, without any hypotension. 3. Variation of heart rate between 71 and 102. Electronically signed by GABBY Carbajal CNP on   11/30/18    10/24/18 SE   Summary   Stress echocardiogram without clinical, electrocardiographic, or   echocardiographic evidence of myocardial ischemia.   Martinez Treadmill Score was 1.5.   Test was supervised by Dr. Concepcion Vance   Electronically signed by Angelo Barcenas MD(Interpreting  [address] [place] [address]) on 10/24/2018 12:53 PM    8/17/18 cardiac monitor  FINDINGS  1. The basleine rhythm is normal sinus at a mean heart rate of 70   bpm, with a range of 48 to 127 bpm.  2. There were 3 auto triggered events with the corresponding   rhythms of Sinus, Rhythm. 3. There were 11 patient triggered events with the corresponding   rhythms of Sinus, sinus tach, sinus arrhythmia, atrial   tachycardia. There was 61 hrs of bradycardia and 11 hrs of   tachycardia  4. Prolonged pause(s) was not noted. CONCLUSIONS  This 30 day event monitor shows moderate correlation between   rhythms and symptoms. Adalberto Bedolla MD  [facility]  8/17/18    Stable CV status without overt heart failure, sensed arrhythmia or angina. Autonomic dysfunction with fluctuating BP - change Lisinopril. dose schedule to bedtime. Advised patient to stay well hydrated. Continue wearing TEDs. Advised to change positions slowly. Patient advised not to drive. Syncope - Plan to discuss syncope with Dr. Radha Sexton. Consider loop recorder. HTN - fluctuates.  BP log for 2 weeks with call

## 2019-10-16 ENCOUNTER — TELEPHONE (OUTPATIENT)
Dept: CARDIOLOGY | Age: 69
End: 2019-10-16

## 2019-10-22 ENCOUNTER — OFFICE VISIT (OUTPATIENT)
Dept: CARDIOLOGY | Age: 69
End: 2019-10-22
Payer: MEDICARE

## 2019-10-22 VITALS
BODY MASS INDEX: 28.17 KG/M2 | DIASTOLIC BLOOD PRESSURE: 98 MMHG | HEART RATE: 68 BPM | HEIGHT: 63 IN | WEIGHT: 159 LBS | SYSTOLIC BLOOD PRESSURE: 180 MMHG

## 2019-10-22 DIAGNOSIS — I10 ESSENTIAL HYPERTENSION: Primary | ICD-10-CM

## 2019-10-22 DIAGNOSIS — G90.9 AUTONOMIC DYSFUNCTION: ICD-10-CM

## 2019-10-22 PROCEDURE — 93000 ELECTROCARDIOGRAM COMPLETE: CPT | Performed by: INTERNAL MEDICINE

## 2019-10-22 PROCEDURE — 1036F TOBACCO NON-USER: CPT | Performed by: INTERNAL MEDICINE

## 2019-10-22 PROCEDURE — 1090F PRES/ABSN URINE INCON ASSESS: CPT | Performed by: INTERNAL MEDICINE

## 2019-10-22 PROCEDURE — 99213 OFFICE O/P EST LOW 20 MIN: CPT | Performed by: INTERNAL MEDICINE

## 2019-10-22 PROCEDURE — G8400 PT W/DXA NO RESULTS DOC: HCPCS | Performed by: INTERNAL MEDICINE

## 2019-10-22 PROCEDURE — G8427 DOCREV CUR MEDS BY ELIG CLIN: HCPCS | Performed by: INTERNAL MEDICINE

## 2019-10-22 PROCEDURE — 4040F PNEUMOC VAC/ADMIN/RCVD: CPT | Performed by: INTERNAL MEDICINE

## 2019-10-22 PROCEDURE — G8417 CALC BMI ABV UP PARAM F/U: HCPCS | Performed by: INTERNAL MEDICINE

## 2019-10-22 PROCEDURE — 3017F COLORECTAL CA SCREEN DOC REV: CPT | Performed by: INTERNAL MEDICINE

## 2019-10-22 PROCEDURE — 1123F ACP DISCUSS/DSCN MKR DOCD: CPT | Performed by: INTERNAL MEDICINE

## 2019-10-22 PROCEDURE — G8484 FLU IMMUNIZE NO ADMIN: HCPCS | Performed by: INTERNAL MEDICINE

## 2019-10-22 RX ORDER — LEVOTHYROXINE SODIUM 0.03 MG/1
TABLET ORAL DAILY
COMMUNITY
End: 2020-02-24 | Stop reason: SDUPTHER

## 2019-10-22 RX ORDER — CLONIDINE HYDROCHLORIDE 0.1 MG/1
0.1 TABLET ORAL NIGHTLY
Qty: 90 TABLET | Refills: 3 | Status: SHIPPED | OUTPATIENT
Start: 2019-10-22 | End: 2021-05-04 | Stop reason: SDUPTHER

## 2020-02-07 PROCEDURE — 88305 TISSUE EXAM BY PATHOLOGIST: CPT | Performed by: SURGERY

## 2020-02-10 ENCOUNTER — LAB REQUISITION (OUTPATIENT)
Dept: LAB | Facility: HOSPITAL | Age: 70
End: 2020-02-10

## 2020-02-10 DIAGNOSIS — Z00.00 ENCOUNTER FOR GENERAL ADULT MEDICAL EXAMINATION WITHOUT ABNORMAL FINDINGS: ICD-10-CM

## 2020-02-11 LAB
CYTO UR: NORMAL
LAB AP CASE REPORT: NORMAL
LAB AP CLINICAL INFORMATION: NORMAL
PATH REPORT.FINAL DX SPEC: NORMAL
PATH REPORT.GROSS SPEC: NORMAL

## 2020-02-24 ENCOUNTER — OFFICE VISIT (OUTPATIENT)
Dept: CARDIOLOGY | Age: 70
End: 2020-02-24
Payer: MEDICARE

## 2020-02-24 VITALS
DIASTOLIC BLOOD PRESSURE: 100 MMHG | BODY MASS INDEX: 28.53 KG/M2 | SYSTOLIC BLOOD PRESSURE: 138 MMHG | HEART RATE: 74 BPM | WEIGHT: 161 LBS | HEIGHT: 63 IN

## 2020-02-24 PROCEDURE — 3017F COLORECTAL CA SCREEN DOC REV: CPT | Performed by: INTERNAL MEDICINE

## 2020-02-24 PROCEDURE — 4040F PNEUMOC VAC/ADMIN/RCVD: CPT | Performed by: INTERNAL MEDICINE

## 2020-02-24 PROCEDURE — G8427 DOCREV CUR MEDS BY ELIG CLIN: HCPCS | Performed by: INTERNAL MEDICINE

## 2020-02-24 PROCEDURE — G8484 FLU IMMUNIZE NO ADMIN: HCPCS | Performed by: INTERNAL MEDICINE

## 2020-02-24 PROCEDURE — 1036F TOBACCO NON-USER: CPT | Performed by: INTERNAL MEDICINE

## 2020-02-24 PROCEDURE — 1123F ACP DISCUSS/DSCN MKR DOCD: CPT | Performed by: INTERNAL MEDICINE

## 2020-02-24 PROCEDURE — G8400 PT W/DXA NO RESULTS DOC: HCPCS | Performed by: INTERNAL MEDICINE

## 2020-02-24 PROCEDURE — 1090F PRES/ABSN URINE INCON ASSESS: CPT | Performed by: INTERNAL MEDICINE

## 2020-02-24 PROCEDURE — 99212 OFFICE O/P EST SF 10 MIN: CPT | Performed by: INTERNAL MEDICINE

## 2020-02-24 PROCEDURE — G8417 CALC BMI ABV UP PARAM F/U: HCPCS | Performed by: INTERNAL MEDICINE

## 2020-02-24 PROCEDURE — 93000 ELECTROCARDIOGRAM COMPLETE: CPT | Performed by: INTERNAL MEDICINE

## 2020-02-24 RX ORDER — OMEPRAZOLE 40 MG/1
40 CAPSULE, DELAYED RELEASE ORAL DAILY
COMMUNITY
Start: 2020-01-02

## 2020-02-24 NOTE — PROGRESS NOTES
61-year-old lady with history of adrenal insufficiency, stage III chronic kidney disease, autonomic dysfunction, and incredibly labile hypertension returns for routine follow-up. She has demonstrated pressures that range between 180 and 80 and despite efforts to adjust her antihypertensive regimen there is been incredible difficulty in avoiding episodes of hypotension as well as periodic moderately severe hypertension. Suggestion has been made for referral to [facility] after thorough evaluation here including stress testing and echo and tilt table failed to help resolve the issue. On return today she tells me her pressure continues to run anywhere between 90 and 160 and that she merely doses her clonidine based on need. On exam she carries 161 pounds in a 5 foot 2 inch frame. Pressure is 138/100 with a pulse of 74. EOMs full, sclerae and conjunctiva normal. Normal dentition. No elevation of central venous pressure at 45 degrees. Carotid upstrokes normal without delay or bruit. Thyroid normal to palpation. Chest clear to auscultation without rales or prolonged expiratory phase. No skin lesions seen. PMI normal. S1, S2 normal without murmur or alma. Normal bowel sounds without palpable mass or bruit. No significant lower extremity edema. Normal range of motion with normal gait. Alert, oriented x 3 and cognition normal as reflected by conversation. EKG reveals sinus rhythm with low voltage in the precordial leads. Assessment/plan:  1. Labile hypertension -suggested she merely continue present approach with clonidine used to address significant blood pressure rise.   2.  Chronic kidney disease - Monitored/managed by Dr. Cata Barba

## 2020-05-06 ENCOUNTER — VIRTUAL VISIT (OUTPATIENT)
Dept: CARDIOLOGY | Age: 70
End: 2020-05-06
Payer: MEDICARE

## 2020-05-06 ENCOUNTER — TELEPHONE (OUTPATIENT)
Dept: CARDIOLOGY | Age: 70
End: 2020-05-06

## 2020-05-06 VITALS
SYSTOLIC BLOOD PRESSURE: 155 MMHG | DIASTOLIC BLOOD PRESSURE: 89 MMHG | BODY MASS INDEX: 28.52 KG/M2 | WEIGHT: 155 LBS | HEIGHT: 62 IN | HEART RATE: 85 BPM

## 2020-05-06 PROCEDURE — 99441 PR PHYS/QHP TELEPHONE EVALUATION 5-10 MIN: CPT | Performed by: INTERNAL MEDICINE

## 2020-05-06 RX ORDER — VALACYCLOVIR HYDROCHLORIDE 500 MG/1
500 TABLET, FILM COATED ORAL PRN
COMMUNITY

## 2020-05-06 NOTE — TELEPHONE ENCOUNTER
Scheduled patient for [address] [place] [address] for 5/11 at 9:30 arrival. Advised patient of date and time of test, went over prep, asked her if she has traveled or experienced COVID-19 symptoms and she has not, and advised patient of her follow up appointment with Dr. Gina Siddiqi for results over the phone. She voiced understanding. [place] at the [facility] and [address] [facility] located on the first floor of [place] [address] through hospital main entrance and turn immediately to your left. Patient's contact number:  [phone] (home)      Lexiscan Stress Test      Lexiscan (regadenoson injection) is a prescription drug given through an IV line that increases blood flow through the arteries of the heart during a cardiac nuclear stress test.     There are two parts to a Lexiscan stress test: the rest portion and the exercise portion. For the rest portion, a radioactive tracer is injected into your arm through the IV. After 30 to 60 minutes, the process of imaging will begin. A nuclear camera will be placed on your chest area and images are taken for the next 15 to 20 minutes. For the exercise portion, a nurse will attach EKG electrodes to your chest to monitor your heart rate. The drug Gennette Elva is administered to simulate stress on the heart. Your heart rhythm will then be monitored for the next few minutes. Your blood pressure will also be monitored throughout the exercise portion. [place] through the exercise portion, a second round of radioactive tracer is injected into your body. Your heart rate and EKG will be monitored for another few minutes after administering the drug. Test Preparation:     Bring a list of your current medications. Do not take any of your medications the morning of the test, but bring all morning medications with you as you will take them after the stress portion of the test is completed.      Do not eat Bananas 24 hours prior to test.     No caffeine 24 hours prior to

## 2020-05-11 ENCOUNTER — HOSPITAL ENCOUNTER (OUTPATIENT)
Dept: NUCLEAR MEDICINE | Age: 70
Discharge: HOME | End: 2020-05-13
Payer: MEDICARE

## 2020-05-11 DIAGNOSIS — R07.9 EXERTIONAL CHEST PAIN: ICD-10-CM

## 2020-05-11 DIAGNOSIS — M54.2 NECK PAIN ON LEFT SIDE: ICD-10-CM

## 2020-05-11 LAB
ALT SERPL-CCNC: 18 U/L (ref 5–33)
AST SERPL-CCNC: 21 U/L (ref 5–32)
CHOLESTEROL, TOTAL: 235 MG/DL (ref 160–199)
HDLC SERPL-MCNC: 73 MG/DL (ref 65–121)
LDL CHOLESTEROL CALCULATED: 135 MG/DL
TRIGL SERPL-MCNC: 134 MG/DL (ref 0–149)

## 2020-05-11 PROCEDURE — A9500 TC99M SESTAMIBI: HCPCS | Performed by: INTERNAL MEDICINE

## 2020-05-11 PROCEDURE — 6360000002 HC RX W HCPCS: Performed by: INTERNAL MEDICINE

## 2020-05-11 PROCEDURE — 3430000000 HC RX DIAGNOSTIC RADIOPHARMACEUTICAL: Performed by: INTERNAL MEDICINE

## 2020-05-11 PROCEDURE — 93017 CV STRESS TEST TRACING ONLY: CPT

## 2020-05-11 RX ADMIN — TETRAKIS(2-METHOXYISOBUTYLISOCYANIDE)COPPER(I) TETRAFLUOROBORATE 10 MILLICURIE: 1 INJECTION, POWDER, LYOPHILIZED, FOR SOLUTION INTRAVENOUS at 11:59

## 2020-05-11 RX ADMIN — REGADENOSON 0.4 MG: 0.08 INJECTION, SOLUTION INTRAVENOUS at 12:05

## 2020-05-11 RX ADMIN — TETRAKIS(2-METHOXYISOBUTYLISOCYANIDE)COPPER(I) TETRAFLUOROBORATE 30 MILLICURIE: 1 INJECTION, POWDER, LYOPHILIZED, FOR SOLUTION INTRAVENOUS at 12:00

## 2020-05-12 LAB
LV EF: 77 %
LVEF MODALITY: NORMAL

## 2020-05-14 ENCOUNTER — VIRTUAL VISIT (OUTPATIENT)
Dept: CARDIOLOGY | Age: 70
End: 2020-05-14
Payer: MEDICARE

## 2020-05-14 VITALS
WEIGHT: 155 LBS | BODY MASS INDEX: 28.52 KG/M2 | HEIGHT: 62 IN | DIASTOLIC BLOOD PRESSURE: 94 MMHG | SYSTOLIC BLOOD PRESSURE: 144 MMHG

## 2020-05-14 PROCEDURE — 99441 PR PHYS/QHP TELEPHONE EVALUATION 5-10 MIN: CPT | Performed by: INTERNAL MEDICINE

## 2020-05-14 RX ORDER — NITROGLYCERIN 0.4 MG/1
0.4 TABLET SUBLINGUAL EVERY 5 MIN PRN
Qty: 25 TABLET | Refills: 3 | Status: SHIPPED | OUTPATIENT
Start: 2020-05-14 | End: 2021-05-04 | Stop reason: SDUPTHER

## 2020-05-15 ENCOUNTER — TELEPHONE (OUTPATIENT)
Dept: CARDIOLOGY | Age: 70
End: 2020-05-15

## 2020-05-15 NOTE — TELEPHONE ENCOUNTER
Per Dr. Irving Bowles advise patient to schedule appointment with PCP to have cervical spine checked. Notified patient, she voiced understanding.

## 2020-05-15 NOTE — TELEPHONE ENCOUNTER
Per Dr. Felipe Bocanegra call and check on patient today to see how Ntg is working for her. Tried to reach patient. Left vm to return call.

## 2020-06-02 ENCOUNTER — TELEPHONE (OUTPATIENT)
Dept: CARDIOLOGY | Age: 70
End: 2020-06-02

## 2020-06-02 NOTE — TELEPHONE ENCOUNTER
My BP was 144/107, then it was 167/102. Now i'm afraid i am going to have a stroke. Is there anything else we can do? I know because of the dysautonomia , it is very difficult to manage. Also later in the afternoon we were down to 96/71. Patient was scheduled for a PV with Dr. Felecia Beltre tomorrow.

## 2020-06-03 ENCOUNTER — VIRTUAL VISIT (OUTPATIENT)
Dept: CARDIOLOGY | Age: 70
End: 2020-06-03
Payer: MEDICARE

## 2020-06-03 VITALS
HEART RATE: 75 BPM | SYSTOLIC BLOOD PRESSURE: 133 MMHG | BODY MASS INDEX: 28.52 KG/M2 | HEIGHT: 62 IN | DIASTOLIC BLOOD PRESSURE: 88 MMHG | WEIGHT: 155 LBS

## 2020-06-03 PROCEDURE — 99441 PR PHYS/QHP TELEPHONE EVALUATION 5-10 MIN: CPT | Performed by: INTERNAL MEDICINE

## 2020-08-25 ENCOUNTER — OFFICE VISIT (OUTPATIENT)
Dept: CARDIOLOGY | Age: 70
End: 2020-08-25
Payer: MEDICARE

## 2020-08-25 VITALS
HEIGHT: 62 IN | SYSTOLIC BLOOD PRESSURE: 142 MMHG | BODY MASS INDEX: 29.81 KG/M2 | DIASTOLIC BLOOD PRESSURE: 90 MMHG | HEART RATE: 90 BPM | WEIGHT: 162 LBS

## 2020-08-25 PROCEDURE — G8400 PT W/DXA NO RESULTS DOC: HCPCS | Performed by: CLINICAL NURSE SPECIALIST

## 2020-08-25 PROCEDURE — 99213 OFFICE O/P EST LOW 20 MIN: CPT | Performed by: CLINICAL NURSE SPECIALIST

## 2020-08-25 PROCEDURE — G8417 CALC BMI ABV UP PARAM F/U: HCPCS | Performed by: CLINICAL NURSE SPECIALIST

## 2020-08-25 PROCEDURE — 3017F COLORECTAL CA SCREEN DOC REV: CPT | Performed by: CLINICAL NURSE SPECIALIST

## 2020-08-25 PROCEDURE — 1036F TOBACCO NON-USER: CPT | Performed by: CLINICAL NURSE SPECIALIST

## 2020-08-25 PROCEDURE — 1123F ACP DISCUSS/DSCN MKR DOCD: CPT | Performed by: CLINICAL NURSE SPECIALIST

## 2020-08-25 PROCEDURE — 1090F PRES/ABSN URINE INCON ASSESS: CPT | Performed by: CLINICAL NURSE SPECIALIST

## 2020-08-25 PROCEDURE — G8427 DOCREV CUR MEDS BY ELIG CLIN: HCPCS | Performed by: CLINICAL NURSE SPECIALIST

## 2020-08-25 PROCEDURE — 4040F PNEUMOC VAC/ADMIN/RCVD: CPT | Performed by: CLINICAL NURSE SPECIALIST

## 2020-08-25 RX ORDER — MELOXICAM 7.5 MG/1
7.5 TABLET ORAL DAILY
COMMUNITY
End: 2022-09-21 | Stop reason: ALTCHOICE

## 2020-08-25 NOTE — PROGRESS NOTES
[facility] Card[facility]  [facility] [address]  [id number]  Phone: [phone]  Fax: [phone]    OFFICE VISIT:  2020    Skye Blackwell - : 1950    Reason For Visit:  Min Salgado is a 78 y.o. female who is here for 6 Month Follow-Up (no cariac symptoms) and Hypertension  history of dyslipidemia, hypertension, stage III chronic kidney disease, strong family history of coronary disease, and severe autonomic dysfunction with labile pressures   She had negative Lexiscan May of this year    Has had a few virtual visits with Dr. Rasheed Pereira to address labile hypertension. Using clonidine for pressure greater than 183 systolic  She is seeing neurosurgeon and she does have pinched nerve in her neck   She thinks often her blood pressure is elevated due to the pain. She had one episode of getting really dizzy and had to lay down on the floor but other than that she has been doing well. She tries not to take any extra clonidine unless she has to because it makes her tired        Subjective  Min Salgado denies exertional chest pain, shortness of breath, orthopnea, paroxysmal nocturnal dyspnea, syncope, presyncope, arrhythmia, edema and fatigue. The patient denies numbness or weakness to suggest cerebrovascular accident or transient ischemic attack. Ione Saint, MD is PCP and follows labs.   Nick Conrad has the following history as recorded in [facility]:    Patient Active Problem List    Diagnosis Date Noted    Autonomic dysfunction 2019    Syncope 2019    Nausea 2019    Near syncope 2018    Chronic fatigue syndrome 2018    Chronic renal failure 2018    Essential hypertension 2018    Hypothyroidism due to Hashimoto's thyroiditis 2018    Loose stools 2018    Bilateral lower abdominal cramping 2018    Family history of colonic polyps 2018    Family history of gastric cancer 2018    Iron deficiency anemia 2018  LLQ pain 2018    Anemia 2018    Left lower quadrant abdominal pain of unknown etiology 2018    Diarrhea 2018    Fatigue 2017    Dizziness 2017    Weakness 2017    Cervicalgia 2016    Spondylosis of cervical region without myelopathy or radiculopathy 2016    Lumbosacral spondylosis without myelopathy 05/10/2012     Past Medical History:   Diagnosis Date    Adrenal insufficiency (HCC)     Autonomic nervous system disease or syndrome     CFS (chronic fatigue syndrome)     GERD (gastroesophageal reflux disease)     Hypertension     Kidney disease     third stage    Thyroid condition      Past Surgical History:   Procedure Laterality Date    APPENDECTOMY      CARDIAC CATHETERIZATION       SECTION      x2    COLONOSCOPY      COLONOSCOPY N/A 2018    Dr Patt Lim AP (-) dysplasia x 1, HP x 2, BCM x 1--5 yr recall    HYSTERECTOMY      NECK SURGERY      OVARY REMOVAL      [place] EGD TRANSORAL BIOPSY SINGLE/MULTIPLE N/A 2018    Dr Michelle Denney (-)    UPPER GASTROINTESTINAL ENDOSCOPY       Family History   Problem Relation Age of Onset    Colon Polyps Sister     Stomach Cancer Brother     Coronary Art Dis Mother     Dementia Father     Colon Cancer Niece     Liver Cancer Neg Hx     Liver Disease Neg Hx     Esophageal Cancer Neg Hx     Rectal Cancer Neg Hx      Social History     Tobacco Use    Smoking status: Never Smoker    Smokeless tobacco: Never Used   Substance Use Topics    Alcohol use: No      Current Outpatient Medications   Medication Sig Dispense Refill    meloxicam (MOBIC) 7.5 MG tablet Take 7.5 mg by mouth daily      aspirin 81 MG tablet Take 81 mg by mouth daily      nitroGLYCERIN (NITROSTAT) 0.4 MG SL tablet Place 1 tablet under the tongue every 5 minutes as needed for Chest pain 25 tablet 3    valACYclovir (VALTREX) 500 MG tablet Take 500 mg by mouth as needed (fever blister)      omeprazole hematuria. Musculoskeletal - no back pain, gait disturbance, or myalgia. Skin - no color change or rash. No pallor. No new surgical incision. Neurologic - no speech difficulty, facial asymmetry or lateralizing weakness. No seizures, presyncope, syncope, + dizziness. Hematologic - no easy bruising or excessive bleeding. Psychiatric - no severe anxiety or insomnia. No confusion. All other review of systems are negative. Objective  Vital Signs - BP (!) 142/90   Pulse 90   Ht 5' 2\" (1.575 m)   Wt 162 lb (73.5 kg)   BMI 29.63 kg/m²   General - [facility] is alert, cooperative, and pleasant. Well groomed. No acute distress. Body habitus is normal.  HEENT - The head is normocephalic. No circumoral cyanosis. Dentition is normal.   EYES -  No Xanthelasma, no arcus senilis, no conjunctival hemorrhages or discharge. Neck - Supple, without increased jugular venous pressures. No carotid bruits. No mass. Respiratory - Lungs are clear bilaterally. No wheezes or rales. Normal effort without use of accessory muscles. Cardiovascular - Heart has regular rhythm and rate. No murmurs, rubs or gallops. + pedal pulses and no varicosities. Abdominal -  Soft, nontender, nondistended. Bowel sounds are intact. Extremities - No clubbing, cyanosis, or  edema. Musculoskeletal -  No clubbing . No Osler's nodes. Gait normal .  No kyphosis or scoliosis. Skin -  no statis ulcers or dermatitis. Neurological - No focal signs are identified. Oriented to person, place and time. Psychiatric -  Appropriate affect and mood. Assessment:     Diagnosis Orders   1. Essential hypertension     2.  Autonomic dysfunction       Data:  BP Readings from Last 3 Encounters:   08/25/20 (!) 142/90   06/03/20 133/88   05/14/20 (!) 144/94    Pulse Readings from Last 3 Encounters:   08/25/20 90   06/03/20 75   05/06/20 85        Wt Readings from Last 3 Encounters:   08/25/20 162 lb (73.5 kg)   06/03/20 155 lb (70.3 kg)   05/14/20 155 lb (70.3 kg)     Blood pressure slightly elevated today. Does take clonidine daily and as needed for blood pressure greater than 150. One recent episode of dizziness having to lie down otherwise doing fairly well. Pain may be affecting her blood pressure. Encouraged her to recheck it before taking extra clonidine to prevent any hypotensive episodes  Had negative Lexiscan in May of this year  Patient is on thyroid replacement-states adequately controlled     States taking medications as prescribed  Stable cardiovascular status. No evidence of overt heart failure, angina or dysrhythmia. Plan  Use the clonidine if BP staying > 150/90  Follow up in 6mos With Dr. Jasbir Go   Call with any questions or concerns  Follow up with Meng Stacy MD for non cardiac problems and labs   Report any new problems  Cardiovascular Fitness-Exercise as tolerated. Strive for 30 minutes of exercise most days of the week. Cardiac / Healthy Diet  Continue current medications as directed  Continue plan of treatment  It is always recommended that you bring your medications bottles with you to each visit - this is for your safety! GABBY Batista    EMR dragon/transcription disclaimer: Much of this encounter note is electronic transcription/translation of spoken language to printed tach. Electronic translation of spoken language may be erroneous, or at times, nonsensical words or phrases may be inadvertently transcribed.  Although, I have reviewed the note for such errors, some may still exist.

## 2020-08-25 NOTE — PATIENT INSTRUCTIONS
Use the clonidine if BP staying > 150/90  Follow up in 6mos With Dr. Haskins Gather   Call with any questions or concerns  Follow up with Dominick Hankins MD for non cardiac problems and labs   Report any new problems  Cardiovascular Fitness-Exercise as tolerated. Strive for 30 minutes of exercise most days of the week. Cardiac / Healthy Diet  Continue current medications as directed  Continue plan of treatment  It is always recommended that you bring your medications bottles with you to each visit - this is for your safety!

## 2020-09-18 ENCOUNTER — HOSPITAL ENCOUNTER (OUTPATIENT)
Dept: WOMENS IMAGING | Age: 70
Discharge: HOME | End: 2020-09-18
Payer: MEDICARE

## 2020-09-18 PROCEDURE — 77080 DXA BONE DENSITY AXIAL: CPT

## 2020-09-25 ENCOUNTER — TRANSCRIBE ORDERS (OUTPATIENT)
Dept: ADMINISTRATIVE | Facility: HOSPITAL | Age: 70
End: 2020-09-25

## 2020-09-25 DIAGNOSIS — Z12.31 ENCOUNTER FOR SCREENING MAMMOGRAM FOR MALIGNANT NEOPLASM OF BREAST: Primary | ICD-10-CM

## 2020-10-07 ENCOUNTER — HOSPITAL ENCOUNTER (OUTPATIENT)
Dept: MAMMOGRAPHY | Facility: HOSPITAL | Age: 70
Discharge: HOME | End: 2020-10-07
Admitting: OBSTETRICS & GYNECOLOGY

## 2020-10-07 DIAGNOSIS — Z12.31 ENCOUNTER FOR SCREENING MAMMOGRAM FOR MALIGNANT NEOPLASM OF BREAST: ICD-10-CM

## 2020-10-07 PROCEDURE — 77067 SCR MAMMO BI INCL CAD: CPT

## 2020-10-07 PROCEDURE — 77063 BREAST TOMOSYNTHESIS BI: CPT

## 2020-10-15 ENCOUNTER — TELEPHONE (OUTPATIENT)
Dept: GASTROENTEROLOGY | Facility: CLINIC | Age: 70
End: 2020-10-15

## 2020-10-15 NOTE — TELEPHONE ENCOUNTER
I have sent 2 letters to pt re: recall colon and have had no response. I called and spoke to her about it-she tells me she had one at another doctor this past Jan. I will remove pt from my recall list.

## 2020-11-03 PROBLEM — R55 SYNCOPE: Status: RESOLVED | Noted: 2019-09-30 | Resolved: 2020-11-03

## 2021-01-20 ENCOUNTER — TELEPHONE (OUTPATIENT)
Dept: CARDIOLOGY CLINIC | Age: 71
End: 2021-01-20

## 2021-01-20 NOTE — TELEPHONE ENCOUNTER
Called and left message for patient letting them know Dr. Marzena Roth will be out of the office this day and we need to reschedule them to see an APRN.  If patient calls back please reschedule to any APRN or to another day with Dr. Marzena Roth

## 2021-03-10 ENCOUNTER — OFFICE VISIT (OUTPATIENT)
Dept: UROLOGY | Age: 71
End: 2021-03-10
Payer: MEDICARE

## 2021-03-10 VITALS
SYSTOLIC BLOOD PRESSURE: 187 MMHG | HEART RATE: 90 BPM | DIASTOLIC BLOOD PRESSURE: 84 MMHG | TEMPERATURE: 97.4 F | WEIGHT: 165 LBS | BODY MASS INDEX: 29.23 KG/M2 | RESPIRATION RATE: 16 BRPM | HEIGHT: 63 IN

## 2021-03-10 DIAGNOSIS — N39.0 URINARY TRACT INFECTION WITHOUT HEMATURIA, SITE UNSPECIFIED: ICD-10-CM

## 2021-03-10 DIAGNOSIS — Z87.440 HISTORY OF RECURRENT UTIS: Primary | ICD-10-CM

## 2021-03-10 LAB
ANION GAP SERPL CALCULATED.3IONS-SCNC: 10 MMOL/L (ref 7–19)
APPEARANCE FLUID: CLEAR
BILIRUBIN, POC: NORMAL
BLOOD URINE, POC: NORMAL
BUN BLDV-MCNC: 17 MG/DL (ref 8–23)
CALCIUM SERPL-MCNC: 9.4 MG/DL (ref 8.8–10.2)
CHLORIDE BLD-SCNC: 104 MMOL/L (ref 98–111)
CLARITY, POC: CLEAR
CO2: 26 MMOL/L (ref 22–29)
COLOR, POC: YELLOW
CREAT SERPL-MCNC: 1 MG/DL (ref 0.5–0.9)
GFR AFRICAN AMERICAN: >59
GFR NON-AFRICAN AMERICAN: 55
GLUCOSE BLD-MCNC: 111 MG/DL (ref 74–109)
GLUCOSE URINE, POC: NORMAL
KETONES, POC: NORMAL
LEUKOCYTE EST, POC: NORMAL
NITRITE, POC: NORMAL
PH, POC: 5.5
POTASSIUM SERPL-SCNC: 3.9 MMOL/L (ref 3.5–5)
PROTEIN, POC: NORMAL
SODIUM BLD-SCNC: 140 MMOL/L (ref 136–145)
SPECIFIC GRAVITY, POC: 1.01
UROBILINOGEN, POC: 0.2

## 2021-03-10 PROCEDURE — 81002 URINALYSIS NONAUTO W/O SCOPE: CPT | Performed by: NURSE PRACTITIONER

## 2021-03-10 PROCEDURE — 99204 OFFICE O/P NEW MOD 45 MIN: CPT | Performed by: NURSE PRACTITIONER

## 2021-03-10 RX ORDER — EZETIMIBE 10 MG/1
10 TABLET ORAL DAILY
COMMUNITY
Start: 2021-03-04

## 2021-03-10 ASSESSMENT — ENCOUNTER SYMPTOMS
NAUSEA: 0
ABDOMINAL DISTENTION: 0
COLOR CHANGE: 0
CONSTIPATION: 0
ABDOMINAL PAIN: 0
CHEST TIGHTNESS: 0
VOMITING: 0
SHORTNESS OF BREATH: 0

## 2021-03-10 NOTE — PROGRESS NOTES
Rissa Bone is a 78 y.o. female who presents today   Chief Complaint   Patient presents with    New Patient     UTI       Patient presents to the clinic today for evaluation of recurrent UTI. I do not have records from her PCP but will obtain for history of urine cultures and treatment. She states her last infection was approximately 2 weeks ago and just finished ciprofloxacin 2 days ago. She currently denies any dysuria, frequency, urgency, flank pain, fever, chills, nausea, vomiting. She does have a history of CKD stage I and sees a nephrologist Dr. Azalia Antony in [place] [facility]. She does have a history of total hysterectomy and is currently postmenopausal.  She takes an antibiotic post coital and usually has good outcomes from this. She is also on a compounded estrogen from a physician in [place]. She denies any use of lubricant, vaginal discharge. She drinks mainly water with little caffeine or carbonation. Her recurrent UTIs can be intermittent she can go approximately 6 months without a UTI and then she gets one every other month. She has seen Dr. 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 She also has a history of IBS D. I will obtain records from PCP and nephrologist.  She does see an OB/GYN and denies any vaginal prolapse.     Past Medical History:   Diagnosis Date    Adrenal insufficiency ([address]     Autonomic nervous system disease or syndrome     CFS (chronic fatigue syndrome)     GERD (gastroesophageal reflux disease)     Hypertension     Kidney disease     third stage    Thyroid condition        Past Surgical History:   Procedure Laterality Date    APPENDECTOMY      CARDIAC CATHETERIZATION       SECTION      x2    COLONOSCOPY      COLONOSCOPY N/A 2018    Dr Liang Perales AP (-) dysplasia x 1, HP x 2, BCM x 1--5 yr recall    HYSTERECTOMY      NECK SURGERY      OVARY REMOVAL      [place] EGD TRANSORAL BIOPSY SINGLE/MULTIPLE N/A 2/5/2018    Dr Mercy Ramirez- [place] (-)    UPPER GASTROINTESTINAL ENDOSCOPY         Current Outpatient Medications   Medication Sig Dispense Refill    ezetimibe (ZETIA) 10 MG tablet       meloxicam (MOBIC) 7.5 MG tablet Take 7.5 mg by mouth daily      aspirin 81 MG tablet Take 81 mg by mouth daily      nitroGLYCERIN (NITROSTAT) 0.4 MG SL tablet Place 1 tablet under the tongue every 5 minutes as needed for Chest pain 25 tablet 3    valACYclovir (VALTREX) 500 MG tablet Take 500 mg by mouth as needed (fever blister)      omeprazole (PRILOSEC) 40 MG delayed release capsule Take 40 mg by mouth daily      cloNIDine (CATAPRES) 0.1 MG tablet Take 1 tablet by mouth nightly (Patient taking differently: Take 0.1 mg by mouth every morning Take one daily and more as needed for hypertension) 90 tablet 3    Omega-3 Fatty Acids (FISH OIL) 1000 MG CAPS Take 1,000 mg by mouth daily       levothyroxine (SYNTHROID) 25 MCG tablet Take 37.5 mcg by mouth Daily       Magnesium 500 MG CAPS Take 500 mg by mouth daily      thyroid (ARMOUR) 65 MG tablet Take 65 mg by mouth daily       NALTREXONE HCL PO Take 4.5 mg by mouth daily       NONFORMULARY       Cholecalciferol (VITAMIN D3) LIQD Take 8 drops daily      NONFORMULARY 200 mg daily      NONFORMULARY 5 mg daily      NONFORMULARY Take by mouth daily       NONFORMULARY Take by mouth daily       NONFORMULARY Take by mouth 2 times daily       Nutritional Supplements (DHEA PO) Take 5 mg by mouth daily       nitrofurantoin (MACRODANTIN) 100 MG capsule Take 100 mg by mouth as needed       No current facility-administered medications for this visit.         Allergies   Allergen Reactions    Demerol Hcl [Meperidine] Anaphylaxis    Levaquin [Levofloxacin In D5w]     Statins Other (See Comments)     Jaws lock up    Morphine Nausea And Vomiting and Rash       Social History     Socioeconomic History    Marital status:      Spouse name: None    Number of children: None    Years of education: None    Highest education level: None   Occupational History    None   Social Needs    Financial resource strain: None    Food insecurity     Worry: None     Inability: None    Transportation needs     Medical: None     Non-medical: None   Tobacco Use    Smoking status: Never Smoker    Smokeless tobacco: Never Used   Substance and Sexual Activity    Alcohol use: No    Drug use: No    Sexual activity: None   Lifestyle    Physical activity     Days per week: None     Minutes per session: None    Stress: None   Relationships    Social connections     Talks on phone: None     Gets together: None     Attends [employer] service: None     Active member of club or organization: None     Attends meetings of clubs or organizations: None     Relationship status: None    Intimate partner violence     Fear of current or ex partner: None     Emotionally abused: None     Physically abused: None     Forced sexual activity: None   Other Topics Concern    None   Social History Narrative    None       Family History   Problem Relation Age of Onset    Colon Polyps Sister     Stomach Cancer Brother     Coronary Art Dis Mother     Dementia Father     Colon Cancer Niece     Liver Cancer Neg Hx     Liver Disease Neg Hx     Esophageal Cancer Neg Hx     Rectal Cancer Neg Hx        REVIEW OF SYSTEMS:  Review of Systems   Constitutional: Negative for activity change, appetite change, chills, fatigue, fever and unexpected weight change. HENT: Negative for congestion and hearing loss. Respiratory: Negative for chest tightness and shortness of breath. Cardiovascular: Negative for chest pain. Gastrointestinal: Negative for abdominal distention, abdominal pain, constipation, nausea and vomiting. Genitourinary: Negative for decreased urine volume, difficulty urinating, dysuria, flank pain, frequency, hematuria, pelvic pain and urgency.    Musculoskeletal: Negative for arthralgias and gait problem. Skin: Negative for color change. Neurological: Negative for dizziness, syncope, weakness, numbness and headaches. Psychiatric/Behavioral: Negative for behavioral problems and confusion. The patient is not nervous/anxious. PHYSICAL EXAM:  BP (!) 187/84   Pulse 90   Temp 97.4 °F (36.3 °C)   Resp 16   Ht 5' 2.5\" (1.588 m)   Wt 165 lb (74.8 kg)   BMI 29.70 kg/m²   Physical Exam  Constitutional:       General: She is not in acute distress. Appearance: Normal appearance. She is not toxic-appearing. HENT:      Head: Normocephalic. Neck:      Musculoskeletal: Normal range of motion. No neck rigidity. Cardiovascular:      Rate and Rhythm: Normal rate and regular rhythm. Pulmonary:      Effort: Pulmonary effort is normal. No respiratory distress. Breath sounds: No wheezing. Abdominal:      General: There is no distension. Palpations: Abdomen is soft. Tenderness: There is no abdominal tenderness. There is no right CVA tenderness or left CVA tenderness. Musculoskeletal: Normal range of motion. Skin:     General: Skin is warm and dry. Neurological:      Mental Status: She is alert and oriented to person, place, and time. Sensory: Sensation is intact. Motor: No weakness. Gait: Gait normal.   Psychiatric:         Mood and Affect: Mood and affect normal.         Behavior: Behavior normal.       DATA:    Results for orders placed or performed in visit on 03/10/21   POCT Urinalysis no Micro   Result Value Ref Range    Color, UA yellow     Clarity, UA clear     Glucose, UA POC Neg     Bilirubin, UA Neg     Ketones, UA Neg     Spec Grav, UA 1.010     Blood, UA POC Neg     pH, UA 5.5     Protein, UA POC Neg     Urobilinogen, UA 0.2     Leukocytes, UA Neg     Nitrite, UA Neg     Appearance, Fluid Clear Clear, Slightly Cloudy       1. History of recurrent UTIs  We will obtain records indicating recurrent UTIs from PCP.   History of CKD stage I.  Will obtain a BMP prior to obtaining a CT urogram.  Patient is already increased her fluid intake, is taking a antibiotic postcoital, as well as using compounded estrogen. She is also seen an OB/GYN there is no vaginal prolapse or vaginal discharge. Depending on creatinine/GFR will order CT urogram.  We will also obtain records from nephrologist and PCP.    - POCT Urinalysis no Micro  - Basic Metabolic Panel; Future      Orders Placed This Encounter   Procedures    Basic Metabolic Panel     Standing Status:   Future     Number of Occurrences:   1     Standing Expiration Date:   3/10/2022    POCT Urinalysis no Micro        Return in about 2 months (around 5/10/2021) for follow up, CT prior to appt. GABBY Castrejon - CNP    All information inputted into the note by the MA to include chief complaint, past medical history, past surgical history, medications, allergies, social and family history and review of systems has been reviewed and updated as needed by me. EMR Dragon/transcription disclaimer: Much of this documentt is electronic  transcription/translation of spoken language to printed text. The  electronic translation of spoken language may be erroneous, or at times,  nonsensical words or phrases may be inadvertently transcribed.  Although I  have reviewed the document for such errors, some may still exist.

## 2021-03-11 DIAGNOSIS — N39.0 RECURRENT UTI: Primary | ICD-10-CM

## 2021-03-15 ENCOUNTER — HOSPITAL ENCOUNTER (OUTPATIENT)
Dept: CT IMAGING | Age: 71
Discharge: HOME | End: 2021-03-15
Payer: MEDICARE

## 2021-03-15 DIAGNOSIS — N39.0 RECURRENT UTI: ICD-10-CM

## 2021-03-15 PROCEDURE — 6360000004 HC RX CONTRAST MEDICATION: Performed by: NURSE PRACTITIONER

## 2021-03-15 PROCEDURE — 74178 CT ABD&PLV WO CNTR FLWD CNTR: CPT

## 2021-03-15 RX ADMIN — IOPAMIDOL 90 ML: 755 INJECTION, SOLUTION INTRAVENOUS at 11:17

## 2021-05-04 ENCOUNTER — OFFICE VISIT (OUTPATIENT)
Dept: CARDIOLOGY CLINIC | Age: 71
End: 2021-05-04
Payer: MEDICARE

## 2021-05-04 VITALS
SYSTOLIC BLOOD PRESSURE: 134 MMHG | WEIGHT: 163 LBS | HEART RATE: 76 BPM | BODY MASS INDEX: 28.88 KG/M2 | DIASTOLIC BLOOD PRESSURE: 92 MMHG | HEIGHT: 63 IN

## 2021-05-04 DIAGNOSIS — G90.9 AUTONOMIC DYSFUNCTION: ICD-10-CM

## 2021-05-04 DIAGNOSIS — M54.2 NECK PAIN ON LEFT SIDE: ICD-10-CM

## 2021-05-04 DIAGNOSIS — R07.9 EXERTIONAL CHEST PAIN: ICD-10-CM

## 2021-05-04 DIAGNOSIS — I70.90 ATHEROMATOUS PLAQUE: ICD-10-CM

## 2021-05-04 DIAGNOSIS — I10 ESSENTIAL HYPERTENSION: Primary | ICD-10-CM

## 2021-05-04 PROCEDURE — 99213 OFFICE O/P EST LOW 20 MIN: CPT | Performed by: INTERNAL MEDICINE

## 2021-05-04 PROCEDURE — G8417 CALC BMI ABV UP PARAM F/U: HCPCS | Performed by: INTERNAL MEDICINE

## 2021-05-04 PROCEDURE — 1036F TOBACCO NON-USER: CPT | Performed by: INTERNAL MEDICINE

## 2021-05-04 PROCEDURE — 1123F ACP DISCUSS/DSCN MKR DOCD: CPT | Performed by: INTERNAL MEDICINE

## 2021-05-04 PROCEDURE — 93000 ELECTROCARDIOGRAM COMPLETE: CPT | Performed by: INTERNAL MEDICINE

## 2021-05-04 PROCEDURE — G8427 DOCREV CUR MEDS BY ELIG CLIN: HCPCS | Performed by: INTERNAL MEDICINE

## 2021-05-04 PROCEDURE — 1090F PRES/ABSN URINE INCON ASSESS: CPT | Performed by: INTERNAL MEDICINE

## 2021-05-04 PROCEDURE — G8399 PT W/DXA RESULTS DOCUMENT: HCPCS | Performed by: INTERNAL MEDICINE

## 2021-05-04 PROCEDURE — 4040F PNEUMOC VAC/ADMIN/RCVD: CPT | Performed by: INTERNAL MEDICINE

## 2021-05-04 PROCEDURE — 3017F COLORECTAL CA SCREEN DOC REV: CPT | Performed by: INTERNAL MEDICINE

## 2021-05-04 RX ORDER — NITROGLYCERIN 0.4 MG/1
0.4 TABLET SUBLINGUAL EVERY 5 MIN PRN
Qty: 25 TABLET | Refills: 3 | Status: SHIPPED | OUTPATIENT
Start: 2021-05-04 | End: 2022-09-21 | Stop reason: SDUPTHER

## 2021-05-04 RX ORDER — CLONIDINE HYDROCHLORIDE 0.1 MG/1
TABLET ORAL
Qty: 100 TABLET | Refills: 3 | Status: SHIPPED | OUTPATIENT
Start: 2021-05-04 | End: 2022-09-21 | Stop reason: ALTCHOICE

## 2021-05-04 NOTE — PROGRESS NOTES
HISTORY  53-year-old lady with a history of dyslipidemia, hypertension, stage III chronic kidney disease, familial history of coronary disease, peripheral vascular disease, and significant autonomic dysfunction [labile hypertension] returns for routine follow-up visit. Biggest challenge over the years has been managing her hypertension. She has been prone to wide swings in pressure with symptomatic hypotension. Most recently she had discontinued her nocturnal clonidine and takes clonidine on an average of once daily in address of blood pressure rises to in excess of 150. She relates some episodic chest pressure with the blood pressure rises but the symptoms are unchanged from May 2020 at which time a Lexiscan dual-isotope was normal.  Her most recent lipid profile was obtained in April with an LDL of 110, HDL 61, triglycerides 1:15. A urologist recently ordered a abdominal/pelvic CT which revealed atheromatous changes of her aorta. On return today he has been no change in her symptoms from those described previously. She has not been vaccinated as she was cautioned by her endocrinologist about whether or not to proceed with the vaccine. PHYSICAL EXAM  On exam she carries 163 pounds in a 5 foot 2 inch frame. Pressure is 134/92 with pulse of 76. EOMs full, sclerae and conjunctiva normal. PERRLA. Mask in place. Trachea midline with no neck masses. Assessment of internal jugular veins reveals no elevation of central venous pressure at 45 degrees. Carotid pulses normal without delay or bruit. Thyroid normal to palpation. Chest exam reveals normal respiratory effort, no abnormal breath sounds and normal expiratory phase. No skin lesions seen. PMI normal. S1, S2 normal without murmur or alma or click. Normal bowel sounds without palpable mass or bruit. No clubbing or acrocyanosis. No significant lower extremity edema or signs of venous insufficiency. General motor strength appears to be within normal limits. Normal range of motion with normal gait. Alert, oriented x 3, memory and cognition normal as reflected by history and conversation. EKG reveals normal sinus rhythm with low pulse in the precordial leads    ASSESSMENT/PLAN:   1. Hypertension - advised to take clonidine 0.1 q.h.s. in hopes of requiring fewer daily supplemental doses  2. Chest pain - given the atheromatous changes on the CT scan and her history of chest pain with blood pressure rise will obtain calcium screening to decide whether repeat stress testing appropriate. As above had a negative Lexiscan in May 2020 in the face of similar symptoms  3. Dyslipidemia -  acceptable values  4.   Pandemic response - hesitancy understandable

## 2021-05-07 ENCOUNTER — HOSPITAL ENCOUNTER (OUTPATIENT)
Dept: CT IMAGING | Age: 71
Discharge: HOME | End: 2021-05-07
Payer: MEDICARE

## 2021-05-07 DIAGNOSIS — I70.90 ATHEROMATOUS PLAQUE: ICD-10-CM

## 2021-05-07 DIAGNOSIS — R07.9 EXERTIONAL CHEST PAIN: ICD-10-CM

## 2021-05-07 PROCEDURE — 75571 CT HRT W/O DYE W/CA TEST: CPT

## 2021-05-10 ENCOUNTER — OFFICE VISIT (OUTPATIENT)
Dept: UROLOGY | Age: 71
End: 2021-05-10
Payer: MEDICARE

## 2021-05-10 VITALS
WEIGHT: 165 LBS | HEART RATE: 63 BPM | DIASTOLIC BLOOD PRESSURE: 95 MMHG | HEIGHT: 62 IN | BODY MASS INDEX: 30.36 KG/M2 | TEMPERATURE: 98 F | SYSTOLIC BLOOD PRESSURE: 146 MMHG

## 2021-05-10 DIAGNOSIS — N39.0 RECURRENT UTI: Primary | ICD-10-CM

## 2021-05-10 LAB
APPEARANCE FLUID: CLEAR
BILIRUBIN, POC: NORMAL
BLOOD URINE, POC: NORMAL
CLARITY, POC: CLEAR
COLOR, POC: YELLOW
GLUCOSE URINE, POC: NORMAL
KETONES, POC: NORMAL
LEUKOCYTE EST, POC: NORMAL
NITRITE, POC: NORMAL
PH, POC: 7
PROTEIN, POC: NORMAL
SPECIFIC GRAVITY, POC: 1.02
UROBILINOGEN, POC: 0.2

## 2021-05-10 PROCEDURE — 99213 OFFICE O/P EST LOW 20 MIN: CPT | Performed by: NURSE PRACTITIONER

## 2021-05-10 PROCEDURE — 81002 URINALYSIS NONAUTO W/O SCOPE: CPT | Performed by: NURSE PRACTITIONER

## 2021-05-10 RX ORDER — NITROFURANTOIN MACROCRYSTALS 100 MG/1
100 CAPSULE ORAL PRN
Qty: 30 CAPSULE | Refills: 2 | Status: SHIPPED | OUTPATIENT
Start: 2021-05-10

## 2021-05-10 ASSESSMENT — ENCOUNTER SYMPTOMS
COLOR CHANGE: 0
VOMITING: 0
SHORTNESS OF BREATH: 0
CONSTIPATION: 0
ABDOMINAL DISTENTION: 0
ABDOMINAL PAIN: 0
CHEST TIGHTNESS: 0
NAUSEA: 0

## 2021-05-10 NOTE — PROGRESS NOTES
for Chest pain 25 tablet 3    ezetimibe (ZETIA) 10 MG tablet       meloxicam (MOBIC) 7.5 MG tablet Take 7.5 mg by mouth daily      aspirin 81 MG tablet Take 81 mg by mouth daily      valACYclovir (VALTREX) 500 MG tablet Take 500 mg by mouth as needed (fever blister)      omeprazole (PRILOSEC) 40 MG delayed release capsule Take 40 mg by mouth daily      Omega-3 Fatty Acids (FISH OIL) 1000 MG CAPS Take 1,000 mg by mouth daily       levothyroxine (SYNTHROID) 25 MCG tablet Take 37.5 mcg by mouth Daily       Magnesium 500 MG CAPS Take 500 mg by mouth daily      thyroid (ARMOUR) 65 MG tablet Take 60 mg by mouth daily       NALTREXONE HCL PO Take 4.5 mg by mouth daily       NONFORMULARY       Cholecalciferol (VITAMIN D3) LIQD Take 8 drops daily      NONFORMULARY 200 mg daily      NONFORMULARY 5 mg daily      NONFORMULARY Take by mouth daily       NONFORMULARY Take by mouth daily       NONFORMULARY Take by mouth 2 times daily       Nutritional Supplements (DHEA PO) Take 5 mg by mouth daily        No current facility-administered medications for this visit.         Allergies   Allergen Reactions    Demerol Hcl [Meperidine] Anaphylaxis    Levaquin [Levofloxacin In D5w]     Statins Other (See Comments)     Jaws lock up    Morphine Nausea And Vomiting and Rash       Social History     Socioeconomic History    Marital status:      Spouse name: None    Number of children: None    Years of education: None    Highest education level: None   Occupational History    None   Social Needs    Financial resource strain: None    Food insecurity     Worry: None     Inability: None    Transportation needs     Medical: None     Non-medical: None   Tobacco Use    Smoking status: Never Smoker    Smokeless tobacco: Never Used   Substance and Sexual Activity    Alcohol use: No    Drug use: No    Sexual activity: None   Lifestyle    Physical activity     Days per week: None     Minutes per session: None  Stress: None   Relationships    Social connections     Talks on phone: None     Gets together: None     Attends Mandaeism service: None     Active member of club or organization: None     Attends meetings of clubs or organizations: None     Relationship status: None    Intimate partner violence     Fear of current or ex partner: None     Emotionally abused: None     Physically abused: None     Forced sexual activity: None   Other Topics Concern    None   Social History Narrative    None       Family History   Problem Relation Age of Onset    Colon Polyps Sister     Stomach Cancer Brother     Coronary Art Dis Mother     Dementia Father     Colon Cancer Niece     Liver Cancer Neg Hx     Liver Disease Neg Hx     Esophageal Cancer Neg Hx     Rectal Cancer Neg Hx        REVIEW OF SYSTEMS:  Review of Systems   Constitutional: Negative for activity change, appetite change, chills, fatigue, fever and unexpected weight change. HENT: Negative for congestion and hearing loss. Respiratory: Negative for chest tightness and shortness of breath. Cardiovascular: Negative for chest pain. Gastrointestinal: Negative for abdominal distention, abdominal pain, constipation, nausea and vomiting. Genitourinary: Negative for decreased urine volume, difficulty urinating, dysuria, flank pain, frequency, hematuria, pelvic pain and urgency. Musculoskeletal: Negative for arthralgias and gait problem. Skin: Negative for color change. Neurological: Negative for dizziness, syncope, weakness, numbness and headaches. Psychiatric/Behavioral: Negative for behavioral problems and confusion. The patient is not nervous/anxious. PHYSICAL EXAM:  BP (!) 146/95 (Site: Right Upper Arm, Position: Sitting, Cuff Size: Medium Adult)   Pulse 63   Temp 98 °F (36.7 °C) (Temporal)   Ht 5' 2\" (1.575 m)   Wt 165 lb (74.8 kg)   BMI 30.18 kg/m²   Physical Exam  Constitutional:       General: She is not in acute distress. Appearance: Normal appearance. She is not toxic-appearing. HENT:      Head: Normocephalic. Neck:      Musculoskeletal: Normal range of motion. No neck rigidity. Cardiovascular:      Rate and Rhythm: Normal rate and regular rhythm. Pulmonary:      Effort: Pulmonary effort is normal. No respiratory distress. Breath sounds: No wheezing. Abdominal:      General: There is no distension. Palpations: Abdomen is soft. Tenderness: There is no abdominal tenderness. There is no right CVA tenderness or left CVA tenderness. Musculoskeletal: Normal range of motion. Skin:     General: Skin is warm and dry. Neurological:      Mental Status: She is alert and oriented to person, place, and time. Sensory: Sensation is intact. Motor: No weakness. Gait: Gait normal.   Psychiatric:         Mood and Affect: Mood and affect normal.         Behavior: Behavior normal.         DATA:  CMP:    Lab Results   Component Value Date     03/10/2021    K 3.9 03/10/2021     03/10/2021    CO2 26 03/10/2021    BUN 17 03/10/2021    CREATININE 1.0 03/10/2021    GFRAA >59 03/10/2021    LABGLOM 55 03/10/2021    GLUCOSE 111 03/10/2021    PROT 7.9 09/21/2018    LABALBU 4.3 09/21/2018    CALCIUM 9.4 03/10/2021    BILITOT 0.5 09/21/2018    ALKPHOS 49 09/21/2018    AST 21 05/11/2020    ALT 18 05/11/2020       IMAGING:  I have reviewed the CT that was obtained on 5/15/2021. There is no evidence of calculus, hydronephrosis, hydroureter, mass. 1. Recurrent UTI  Will refill Macrodantin 100 mg to be taken 1 tablet post coital.  CT is negative for any reasoning for recurrent UTI. Discussed cystoscopy. Patient has had one in the past which was negative. UA is negative today. Discussed Ellura. Discussed use, benefit, and side effects of prescribed medications. All questions answered. Patient voiced understanding and agreed with treatment plan. We will follow-up in 4 months for efficacy of alert.   She is to follow-up if any signs of UTI prior.    - POCT Urinalysis no Micro  - nitrofurantoin (MACRODANTIN) 100 MG capsule; Take 1 capsule by mouth as needed (post coital)  Dispense: 30 capsule; Refill: 2      Orders Placed This Encounter   Procedures    POCT Urinalysis no Micro        Return in about 4 months (around 9/10/2021). All information inputted into the note by the MA to include chief complaint, past medical history, past surgical history, medications, allergies, social and family history and review of systems has been reviewed and updated as needed by me. EMR Dragon/transcription disclaimer: Much of this documentt is electronic  transcription/translation of spoken language to printed text. The  electronic translation of spoken language may be erroneous, or at times,  nonsensical words or phrases may be inadvertently transcribed.  Although I  have reviewed the document for such errors, some may still exist.

## 2021-05-11 ENCOUNTER — TELEPHONE (OUTPATIENT)
Dept: CARDIOLOGY CLINIC | Age: 71
End: 2021-05-11

## 2021-05-11 NOTE — TELEPHONE ENCOUNTER
Spoke to patient's . Advised him CT calcium score was normal per Dr. Annamarie Franks. He voiced understanding and will let her know.

## 2021-09-10 ENCOUNTER — OFFICE VISIT (OUTPATIENT)
Dept: UROLOGY | Age: 71
End: 2021-09-10
Payer: MEDICARE

## 2021-09-10 DIAGNOSIS — N39.0 RECURRENT UTI: Primary | ICD-10-CM

## 2021-09-10 LAB
BILIRUBIN, POC: NORMAL
BLOOD URINE, POC: NORMAL
CLARITY, POC: CLEAR
COLOR, POC: YELLOW
GLUCOSE URINE, POC: NORMAL
KETONES, POC: NORMAL
LEUKOCYTE EST, POC: NORMAL
NITRITE, POC: NORMAL
PH, POC: 5.5
PROTEIN, POC: NORMAL
SPECIFIC GRAVITY, POC: 1.02
UROBILINOGEN, POC: 0.2

## 2021-09-10 PROCEDURE — 81003 URINALYSIS AUTO W/O SCOPE: CPT | Performed by: NURSE PRACTITIONER

## 2021-09-10 PROCEDURE — 99213 OFFICE O/P EST LOW 20 MIN: CPT | Performed by: NURSE PRACTITIONER

## 2021-09-10 ASSESSMENT — ENCOUNTER SYMPTOMS
BACK PAIN: 0
VOMITING: 0
ABDOMINAL PAIN: 0
NAUSEA: 0
ABDOMINAL DISTENTION: 0

## 2021-09-10 NOTE — PROGRESS NOTES
Sera Calixto is a 73 y.o. female who presents today   Chief Complaint   Patient presents with    Follow-up     4 month follow up for recurrent uti     Patient is a 46-year-old female who presents clinic today for follow-up recurrent UTI. She was seen at the beginning of  to establish care for recurrent UTI. I did obtain a CT which was unremarkable. She currently takes Macrobid postcoital with good outcomes from this. She is currently on a compounded estrogen from a physician in [place]. I started her on Ellura at last visit approximately 4 months ago and she has remained free from a UTI for at least the past 6 months. She is doing significantly well on this medication. Denies any lower urinary tract symptoms whatsoever. Past Medical History:   Diagnosis Date    Adrenal insufficiency (HCC)     Autonomic nervous system disease or syndrome     CFS (chronic fatigue syndrome)     GERD (gastroesophageal reflux disease)     Hypertension     Kidney disease     third stage    Thyroid condition        Past Surgical History:   Procedure Laterality Date    APPENDECTOMY      CARDIAC CATHETERIZATION       SECTION      x2    COLONOSCOPY      COLONOSCOPY N/A 2018    Dr July Rg AP (-) dysplasia x 1, HP x 2, BCM x 1--5 yr recall    HYSTERECTOMY      NECK SURGERY      OVARY REMOVAL      NE EGD TRANSORAL BIOPSY SINGLE/MULTIPLE N/A 2018    Dr Elzbieta Spangler- Jumana (-)    UPPER GASTROINTESTINAL ENDOSCOPY         Current Outpatient Medications   Medication Sig Dispense Refill    Cranberry (ELLURA PO) Take by mouth      cloNIDine (CATAPRES) 0.1 MG tablet Take one tablet by mouth nightly. Take an extra tablet for high blood pressure readings if needed.  100 tablet 3    nitroGLYCERIN (NITROSTAT) 0.4 MG SL tablet Place 1 tablet under the tongue every 5 minutes as needed for Chest pain 25 tablet 3    ezetimibe (ZETIA) 10 MG tablet       meloxicam (MOBIC) 7.5 MG tablet Take 7.5 mg Insecurity:     Worried About Running Out of Food in the Last Year:     [address] in the Last Year:    Transportation Needs:     Lack of Transportation (Medical):  Lack of Transportation (Non-Medical):    Physical Activity:     Days of Exercise per Week:     Minutes of Exercise per Session:    Stress:     Feeling of Stress :    Social Connections:     Frequency of Communication with Friends and Family:     Frequency of Social Gatherings with Friends and Family:     Attends Shinto Services:     Active Member of Clubs or Organizations:     Attends Club or Organization Meetings:     Marital Status:    Intimate Partner Violence:     Fear of Current or Ex-Partner:     Emotionally Abused:     Physically Abused:     Sexually Abused:        Family History   Problem Relation Age of Onset    Colon Polyps Sister     Stomach Cancer Brother     Coronary Art Dis Mother     Dementia Father     Colon Cancer Niece     Liver Cancer Neg Hx     Liver Disease Neg Hx     Esophageal Cancer Neg Hx     Rectal Cancer Neg Hx        REVIEW OF SYSTEMS:  Review of Systems   Constitutional: Negative for chills and fever. Gastrointestinal: Negative for abdominal distention, abdominal pain, nausea and vomiting. Genitourinary: Negative for difficulty urinating, dysuria, flank pain, frequency, hematuria and urgency. Musculoskeletal: Negative for back pain and gait problem. Psychiatric/Behavioral: Negative for agitation and confusion. PHYSICAL EXAM:  There were no vitals taken for this visit. Physical Exam  Vitals and nursing note reviewed. Constitutional:       General: She is not in acute distress. Appearance: Normal appearance. She is not ill-appearing. Pulmonary:      Effort: Pulmonary effort is normal. No respiratory distress. Abdominal:      General: There is no distension. Tenderness: There is no abdominal tenderness. There is no right CVA tenderness or left CVA tenderness. Neurological:      Mental Status: She is alert and oriented to person, place, and time. Mental status is at baseline. Psychiatric:         Mood and Affect: Mood normal.         Behavior: Behavior normal.       DATA:    Results for orders placed or performed in visit on 09/10/21   POCT Urinalysis No Micro (Auto)   Result Value Ref Range    Color, UA yellow     Clarity, UA clear     Glucose, UA POC neg     Bilirubin, UA neg     Ketones, UA neg     Spec Grav, UA 1.020     Blood, UA POC neg     pH, UA 5.5     Protein, UA POC neg     Urobilinogen, UA 0.2     Leukocytes, UA neg     Nitrite, UA neg      1. Recurrent UTI  Doing significantly well on Ellura we will continue this. We will also continue postcoital Macrobid as well as her compounded estrogen cream.  We will have her follow-up in 1 year      Orders Placed This Encounter   Procedures    POCT Urinalysis No Micro (Auto)        Return in about 1 year (around 9/10/2022). All information inputted into the note by the MA to include chief complaint, past medical history, past surgical history, medications, allergies, social and family history and review of systems has been reviewed and updated as needed by me. EMR Dragon/transcription disclaimer: Much of this documentt is electronic  transcription/translation of spoken language to printed text. The  electronic translation of spoken language may be erroneous, or at times,  nonsensical words or phrases may be inadvertently transcribed.  Although I  have reviewed the document for such errors, some may still exist.

## 2021-11-09 ENCOUNTER — OFFICE VISIT (OUTPATIENT)
Dept: CARDIOLOGY CLINIC | Age: 71
End: 2021-11-09
Payer: MEDICARE

## 2021-11-09 VITALS
DIASTOLIC BLOOD PRESSURE: 100 MMHG | WEIGHT: 166 LBS | HEART RATE: 78 BPM | BODY MASS INDEX: 30.55 KG/M2 | SYSTOLIC BLOOD PRESSURE: 144 MMHG | HEIGHT: 62 IN

## 2021-11-09 DIAGNOSIS — I10 ESSENTIAL HYPERTENSION: Primary | ICD-10-CM

## 2021-11-09 DIAGNOSIS — G90.9 AUTONOMIC DYSFUNCTION: ICD-10-CM

## 2021-11-09 DIAGNOSIS — R00.2 PALPITATIONS: ICD-10-CM

## 2021-11-09 PROCEDURE — G8484 FLU IMMUNIZE NO ADMIN: HCPCS | Performed by: CLINICAL NURSE SPECIALIST

## 2021-11-09 PROCEDURE — 1036F TOBACCO NON-USER: CPT | Performed by: CLINICAL NURSE SPECIALIST

## 2021-11-09 PROCEDURE — G8399 PT W/DXA RESULTS DOCUMENT: HCPCS | Performed by: CLINICAL NURSE SPECIALIST

## 2021-11-09 PROCEDURE — 4040F PNEUMOC VAC/ADMIN/RCVD: CPT | Performed by: CLINICAL NURSE SPECIALIST

## 2021-11-09 PROCEDURE — 99214 OFFICE O/P EST MOD 30 MIN: CPT | Performed by: CLINICAL NURSE SPECIALIST

## 2021-11-09 PROCEDURE — 1090F PRES/ABSN URINE INCON ASSESS: CPT | Performed by: CLINICAL NURSE SPECIALIST

## 2021-11-09 PROCEDURE — 1123F ACP DISCUSS/DSCN MKR DOCD: CPT | Performed by: CLINICAL NURSE SPECIALIST

## 2021-11-09 PROCEDURE — G8427 DOCREV CUR MEDS BY ELIG CLIN: HCPCS | Performed by: CLINICAL NURSE SPECIALIST

## 2021-11-09 PROCEDURE — 3017F COLORECTAL CA SCREEN DOC REV: CPT | Performed by: CLINICAL NURSE SPECIALIST

## 2021-11-09 PROCEDURE — G8417 CALC BMI ABV UP PARAM F/U: HCPCS | Performed by: CLINICAL NURSE SPECIALIST

## 2021-11-09 RX ORDER — LEVOTHYROXINE AND LIOTHYRONINE 38; 9 UG/1; UG/1
60 TABLET ORAL DAILY
COMMUNITY

## 2021-11-09 RX ORDER — LEVOTHYROXINE SODIUM 0.05 MG/1
50 TABLET ORAL DAILY
COMMUNITY
Start: 2021-10-27

## 2021-11-09 NOTE — PROGRESS NOTES
[facility]  [facility], [address]  [address]  Phone: [phone]  Fax: [phone]    OFFICE VISIT:  2021    Efren Blackwell - : 1950    Reason For Visit:  Risa Dalton is a 73 y.o. female who is here for 6 Month Follow-Up (pt has issues with flucuating BP's) and Hypertension  History dyslipidemia, hypertension, CKD stage III, familial history of CAD, PVD and autonomic dysfunction (labile hypertension). Has clonidine to take as needed  May 2020 had nuclear stress test that showed no evidence of infarction or ischemia with preserved ejection fraction  May of this year had calcium score-reviewed by Dr. Yue iFelds and thought was normal for age. She returns today in follow-up. She states overall she is doing fairly well. She has been working with a  at [employer]. She states she has not lost any weight but she has gained some strength. At the gym doing her exercises she has not had any problems    She does report that occasionally at home she will randomly get a fast heart rate which is walking across the floor. She also states that last several seconds to a minute  She does have an appointment in March at [facility] with autonomic dysfunction clinic    She tries to take the clonidine at night but cannot take it if her blood pressure is low. If it is greater than 130 she will take it. She also uses it as needed during the day  She is trying to stay hydrated and drink plenty of fluids. She sees Dr Betina Guerrero soon for CKD- has been stable. He is aware of her taking a lower dose of meloxicam.  Is monitoring her kidney function carefully    Has had issues with her thyroid not being regulated. Has had multiple adjustments of her Synthroid. Dearl Pop denies exertional chest pain, shortness of breath, orthopnea, paroxysmal nocturnal dyspnea, syncope, presyncope. Intermittent mild peripheral edema and fatigue.   The patient denies numbness or weakness to suggest cerebrovascular accident or transient ischemic attack. Delio Sandoval MD is PCP .   Stefan Simms has the following history as recorded in [facility]:    Patient Active Problem List    Diagnosis Date Noted    Autonomic dysfunction 2019    Nausea 2019    Near syncope 2018    Chronic fatigue syndrome 2018    Chronic renal failure 2018    Essential hypertension 2018    Hypothyroidism due to Hashimoto's thyroiditis 2018    Loose stools 2018    Bilateral lower abdominal cramping 2018    Family history of colonic polyps 2018    Family history of gastric cancer 2018    Iron deficiency anemia 2018    LLQ pain 2018    Anemia 2018    Left lower quadrant abdominal pain of unknown etiology 2018    Diarrhea 2018    Fatigue 2017    Dizziness 2017    Weakness 2017    Cervicalgia 2016    Spondylosis of cervical region without myelopathy or radiculopathy 2016    Lumbosacral spondylosis without myelopathy 05/10/2012     Past Medical History:   Diagnosis Date    Adrenal insufficiency (HCC)     Autonomic nervous system disease or syndrome     CFS (chronic fatigue syndrome)     GERD (gastroesophageal reflux disease)     Hypertension     Kidney disease     third stage    Thyroid condition      Past Surgical History:   Procedure Laterality Date    APPENDECTOMY      CARDIAC CATHETERIZATION       SECTION      x2    COLONOSCOPY      COLONOSCOPY N/A 2018    Dr Nisreen Rivas AP (-) dysplasia x 1, HP x 2, BCM x 1--5 yr recall    HYSTERECTOMY      NECK SURGERY      OVARY REMOVAL      OR EGD TRANSORAL BIOPSY SINGLE/MULTIPLE N/A 2018    Dr Sherle Schaumann- [facility]umana (-)    UPPER GASTROINTESTINAL ENDOSCOPY       Family History   Problem Relation Age of Onset    Colon Polyps Sister     Stomach Cancer Brother     Coronary Art Dis Mother     Dementia Father    [facility] Colon Cancer Niece     Liver Cancer Neg Hx     Liver Disease Neg Hx     Esophageal Cancer Neg Hx     Rectal Cancer Neg Hx      Social History     Tobacco Use    Smoking status: Never Smoker    Smokeless tobacco: Never Used   Substance Use Topics    Alcohol use: No      Current Outpatient Medications   Medication Sig Dispense Refill    levothyroxine (SYNTHROID) 50 MCG tablet Take 50 mcg by mouth daily      thyroid (ARMOUR THYROID) 30 MG tablet Take 30 mg by mouth daily      nitrofurantoin (MACRODANTIN) 100 MG capsule Take 1 capsule by mouth as needed (post coital) 30 capsule 2    cloNIDine (CATAPRES) 0.1 MG tablet Take one tablet by mouth nightly. Take an extra tablet for high blood pressure readings if needed. 100 tablet 3    nitroGLYCERIN (NITROSTAT) 0.4 MG SL tablet Place 1 tablet under the tongue every 5 minutes as needed for Chest pain 25 tablet 3    ezetimibe (ZETIA) 10 MG tablet       meloxicam (MOBIC) 7.5 MG tablet Take 7.5 mg by mouth daily      aspirin 81 MG tablet Take 81 mg by mouth daily      valACYclovir (VALTREX) 500 MG tablet Take 500 mg by mouth as needed (fever blister)      omeprazole (PRILOSEC) 40 MG delayed release capsule Take 40 mg by mouth daily      Omega-3 Fatty Acids (FISH OIL) 1000 MG CAPS Take 1,000 mg by mouth daily       Magnesium 500 MG CAPS Take 500 mg by mouth daily      NALTREXONE HCL PO Take 4.5 mg by mouth daily       NONFORMULARY       Cholecalciferol (VITAMIN D3) LIQD Take3 drops daily      NONFORMULARY 200 mg daily      NONFORMULARY 5 mg daily      NONFORMULARY Take by mouth daily       NONFORMULARY Take by mouth daily       NONFORMULARY Take by mouth 2 times daily       Nutritional Supplements (DHEA PO) Take 5 mg by mouth daily        No current facility-administered medications for this visit.      Allergies: Demerol hcl [meperidine], Levaquin [levofloxacin in d5w], Statins, and Morphine    Review of Systems  Constitutional - no significant activity change, appetite change, or unexpected weight change. No fever, chills or diaphoresis. No fatigue. HEENT - no significant rhinorrhea or epistaxis. No tinnitus or significant hearing loss. Eyes - no sudden vision change or amaurosis. Respiratory - no significant wheezing, stridor, apnea or cough. No dyspnea on exertion or shortness of breath. Cardiovascular - no exertional chest pain, orthopnea or PND. + sensation of arrhythmia. no slow heart rate. No claudication  + leg edema. Gastrointestinal - no abdominal swelling or pain. No blood in stool. No severe constipation, diarrhea, nausea, or vomiting. Genitourinary - no difficulty urinating, dysuria, frequency, or urgency. No flank pain or hematuria. Musculoskeletal - no back pain, gait disturbance, or myalgia. Skin - no color change or rash. No pallor. No new surgical incision. Neurologic - no speech difficulty, facial asymmetry or lateralizing weakness. No seizures, presyncope, syncope, or significant dizziness. Hematologic - no easy bruising or excessive bleeding. Psychiatric - no severe anxiety or insomnia. No confusion. All other review of systems are negative. Objective  Vital Signs - BP (!) 144/100 Comment: right arm  Pulse 78   Ht 5' 2\" (1.575 m)   Wt 166 lb (75.3 kg)   BMI 30.36 kg/m²   General - [place] is alert, cooperative, and pleasant. Well groomed. No acute distress. Body habitus is overweight. HEENT - The head is normocephalic. No circumoral cyanosis. Dentition is normal.   EYES -  No Xanthelasma, no arcus senilis, no conjunctival hemorrhages or discharge. Neck - Supple, without increased jugular venous pressures. No carotid bruits. No mass. Respiratory - Lungs are clear bilaterally. No wheezes or rales. Normal effort without use of accessory muscles. Cardiovascular - Heart has regular rhythm and rate. No murmurs, rubs or gallops. + pedal pulses and no varicosities.       Abdominal -  Soft, nontender, nondistended. Bowel sounds are intact. Extremities - No clubbing, cyanosis, or  edema. Musculoskeletal -  No clubbing . No Osler's nodes. Gait normal .  No kyphosis or scoliosis. Skin -  no statis ulcers or dermatitis. Neurological - No focal signs are identified. Oriented to person, place and time. Psychiatric -  Appropriate affect and mood. Assessment:     Diagnosis Orders   1. Essential hypertension     2. Autonomic dysfunction     3. Palpitations       Data:  BP Readings from Last 3 Encounters:   11/09/21 (!) 144/100   05/10/21 (!) 146/95   05/04/21 (!) 134/92    Pulse Readings from Last 3 Encounters:   11/09/21 78   05/10/21 63   05/04/21 76        Wt Readings from Last 3 Encounters:   11/09/21 166 lb (75.3 kg)   05/10/21 165 lb (74.8 kg)   05/04/21 163 lb (73.9 kg)   Blood pressure elevated today. She does have clonidine and is the only antihypertensive she is taking. Monitors it carefully at home    Heart rate controlled mostly but has had episodes of tachycardia with minimal exertion but normally has good exercise tolerance. To be multifactorial with her autonomic dysfunction but also recent multiple adjustments in her thyroid replacement. She is working closely with her endocrinologist.  Discussed that once she is euthyroid will better determine if she needs anything for heart rate. She does have appointment with autonomic specialist at [address] [place] [address] in March     We discussed continuing her regular exercise routine. Letting us know if there is a change in tolerance. We discussed watching for any signs and symptoms of dizziness. Reviewed her carotid study from last year that showed less than 50% bilateral occlusion. Acceptable CT score earlier this year negative nuclear stress test last year     States taking medications as prescribed  Stable cardiovascular status. No evidence of overt heart failure, angina or dysrhythmia.    30 minutes were spent preparing, reviewing and seeing patient. All questions answered    Plan    Follow up in May With Dr. Pulido Fitting   Call with any questions or concerns  Follow up with Dr Richy Knox for kidneys and specialist for thyroid  See the Autonomic specialist as planned. Follow up with Ananth Zamarripa MD for non cardiac problems  Report any new problems  Cardiovascular Fitness-Exercise as tolerated. Strive for 30 minutes of exercise most days of the week. Cardiac / Healthy Diet- stay hydrated   Continue current medications as directed  Continue plan of treatment  It is always recommended that you bring your medications bottles with you to each visit - this is for your safety! GABBY Kay    EMR dragon/transcription disclaimer: Much of this encounter note is electronic transcription/translation of spoken language to printed tach. Electronic translation of spoken language may be erroneous, or at times, nonsensical words or phrases may be inadvertently transcribed.  Although, I have reviewed the note for such errors, some may still exist.

## 2021-11-09 NOTE — PATIENT INSTRUCTIONS
Follow up in May With Dr. Katlyn Enrique   Call with any questions or concerns  Follow up with Dr Mati Tripp for kidneys and specrahel for thyroid  See the Autonomic specialist as planned. Follow up with Rachel Elliott MD for non cardiac problems  Report any new problems  Cardiovascular Fitness-Exercise as tolerated. Strive for 30 minutes of exercise most days of the week. Cardiac / Healthy Diet- stay hydrated   Continue current medications as directed  Continue plan of treatment  It is always recommended that you bring your medications bottles with you to each visit - this is for your safety!

## 2022-05-05 ENCOUNTER — TELEPHONE (OUTPATIENT)
Dept: CARDIOLOGY CLINIC | Age: 72
End: 2022-05-05

## 2022-05-05 NOTE — TELEPHONE ENCOUNTER
Yana Reece called to reschedule a office visit with Dr. Tyler Goncalves. The pt has covid and is unable to come in. The pt only wants to see Dr. Tyler Goncalves. The pt was seen at [facility] and wants to discuss that visit with him. [facility] unable to schedule until January. Please be advised that the best time to call her to accommodate their needs is Anytime. Thank you.

## 2022-06-07 ENCOUNTER — TRANSCRIBE ORDERS (OUTPATIENT)
Dept: ADMINISTRATIVE | Facility: HOSPITAL | Age: 72
End: 2022-06-07

## 2022-06-07 DIAGNOSIS — Z12.31 SCREENING MAMMOGRAM, ENCOUNTER FOR: Primary | ICD-10-CM

## 2022-06-14 ENCOUNTER — HOSPITAL ENCOUNTER (OUTPATIENT)
Dept: MAMMOGRAPHY | Facility: HOSPITAL | Age: 72
Discharge: HOME | End: 2022-06-14
Admitting: OBSTETRICS & GYNECOLOGY

## 2022-06-14 DIAGNOSIS — Z12.31 SCREENING MAMMOGRAM, ENCOUNTER FOR: ICD-10-CM

## 2022-06-14 PROCEDURE — 77063 BREAST TOMOSYNTHESIS BI: CPT

## 2022-06-14 PROCEDURE — 77067 SCR MAMMO BI INCL CAD: CPT

## 2022-08-09 ENCOUNTER — LAB REQUISITION (OUTPATIENT)
Dept: LAB | Facility: HOSPITAL | Age: 72
End: 2022-08-09

## 2022-08-09 DIAGNOSIS — Z00.00 ENCOUNTER FOR GENERAL ADULT MEDICAL EXAMINATION WITHOUT ABNORMAL FINDINGS: ICD-10-CM

## 2022-08-09 PROCEDURE — 88305 TISSUE EXAM BY PATHOLOGIST: CPT | Performed by: SURGERY

## 2022-08-12 LAB
CYTO UR: NORMAL
LAB AP CASE REPORT: NORMAL
LAB AP CLINICAL INFORMATION: NORMAL
Lab: NORMAL
PATH REPORT.FINAL DX SPEC: NORMAL
PATH REPORT.GROSS SPEC: NORMAL

## 2022-09-02 ENCOUNTER — TRANSCRIBE ORDERS (OUTPATIENT)
Dept: ADMINISTRATIVE | Facility: HOSPITAL | Age: 72
End: 2022-09-02

## 2022-09-02 DIAGNOSIS — R10.11 ABDOMINAL PAIN, RIGHT UPPER QUADRANT: Primary | ICD-10-CM

## 2022-09-06 ENCOUNTER — HOSPITAL ENCOUNTER (OUTPATIENT)
Dept: NUCLEAR MEDICINE | Facility: HOSPITAL | Age: 72
Discharge: HOME | End: 2022-09-06

## 2022-09-06 DIAGNOSIS — R10.11 ABDOMINAL PAIN, RIGHT UPPER QUADRANT: ICD-10-CM

## 2022-09-06 PROCEDURE — A9537 TC99M MEBROFENIN: HCPCS | Performed by: INTERNAL MEDICINE

## 2022-09-06 PROCEDURE — 78226 HEPATOBILIARY SYSTEM IMAGING: CPT

## 2022-09-06 PROCEDURE — 0 TECHNETIUM TC 99M MEBROFENIN KIT: Performed by: INTERNAL MEDICINE

## 2022-09-06 RX ORDER — KIT FOR THE PREPARATION OF TECHNETIUM TC 99M MEBROFENIN 45 MG/10ML
1 INJECTION, POWDER, LYOPHILIZED, FOR SOLUTION INTRAVENOUS
Status: COMPLETED | OUTPATIENT
Start: 2022-09-06 | End: 2022-09-06

## 2022-09-06 RX ADMIN — MEBROFENIN 1 DOSE: 45 INJECTION, POWDER, LYOPHILIZED, FOR SOLUTION INTRAVENOUS at 14:31

## 2022-09-21 ENCOUNTER — OFFICE VISIT (OUTPATIENT)
Dept: CARDIOLOGY CLINIC | Age: 72
End: 2022-09-21
Payer: MEDICARE

## 2022-09-21 VITALS
HEIGHT: 62 IN | WEIGHT: 160 LBS | HEART RATE: 70 BPM | BODY MASS INDEX: 29.44 KG/M2 | SYSTOLIC BLOOD PRESSURE: 122 MMHG | DIASTOLIC BLOOD PRESSURE: 74 MMHG

## 2022-09-21 DIAGNOSIS — M54.2 NECK PAIN ON LEFT SIDE: ICD-10-CM

## 2022-09-21 DIAGNOSIS — I70.90 ATHEROMATOUS PLAQUE: ICD-10-CM

## 2022-09-21 DIAGNOSIS — R07.9 EXERTIONAL CHEST PAIN: ICD-10-CM

## 2022-09-21 DIAGNOSIS — G90.9 AUTONOMIC DYSFUNCTION: Primary | ICD-10-CM

## 2022-09-21 DIAGNOSIS — R55 NEAR SYNCOPE: ICD-10-CM

## 2022-09-21 PROCEDURE — G8417 CALC BMI ABV UP PARAM F/U: HCPCS | Performed by: INTERNAL MEDICINE

## 2022-09-21 PROCEDURE — 1090F PRES/ABSN URINE INCON ASSESS: CPT | Performed by: INTERNAL MEDICINE

## 2022-09-21 PROCEDURE — G8427 DOCREV CUR MEDS BY ELIG CLIN: HCPCS | Performed by: INTERNAL MEDICINE

## 2022-09-21 PROCEDURE — G8399 PT W/DXA RESULTS DOCUMENT: HCPCS | Performed by: INTERNAL MEDICINE

## 2022-09-21 PROCEDURE — 93000 ELECTROCARDIOGRAM COMPLETE: CPT | Performed by: INTERNAL MEDICINE

## 2022-09-21 PROCEDURE — 1036F TOBACCO NON-USER: CPT | Performed by: INTERNAL MEDICINE

## 2022-09-21 PROCEDURE — 3017F COLORECTAL CA SCREEN DOC REV: CPT | Performed by: INTERNAL MEDICINE

## 2022-09-21 PROCEDURE — 1123F ACP DISCUSS/DSCN MKR DOCD: CPT | Performed by: INTERNAL MEDICINE

## 2022-09-21 PROCEDURE — 99214 OFFICE O/P EST MOD 30 MIN: CPT | Performed by: INTERNAL MEDICINE

## 2022-09-21 RX ORDER — NITROGLYCERIN 0.4 MG/1
0.4 TABLET SUBLINGUAL EVERY 5 MIN PRN
Qty: 25 TABLET | Refills: 3 | Status: SHIPPED | OUTPATIENT
Start: 2022-09-21

## 2022-09-21 RX ORDER — DILTIAZEM HYDROCHLORIDE 120 MG/1
120 CAPSULE, EXTENDED RELEASE ORAL DAILY
COMMUNITY

## 2022-09-21 NOTE — PROGRESS NOTES
HISTORY  45-year-old lady with a history of dyslipidemia, hypertension, stage III chronic kidney disease, familial coronary disease, peripheral vascular disease, labile hypertension, and autonomic dysfunction returns for follow-up. Over the years management of her hypertension has been challenging because episodic hypotension which has been significantly symptomatic. Most recently she went to the [facility] autonomic dysfunction clinic and was switched from clonidine to diltiazem SR. On this agent she still had some swings in her pressure but has had reasonable control. Note the fact that she had a tilt table while at [facility] which was apparently negative. She has some peripheral vascular disease as evidenced by CT scan showing atheromatous changes of her aorta but is statin intolerant [jaw locks up] and is on Zetia with an LDL most recently documented [April 2021] at 110 with a concomitant HDL of 61. On return today she has had no symptoms to suggest coronary ischemia, left ventricular dysfunction, or dysrhythmia. She has been vaccinated but not boosted for COVID-19. PHYSICAL EXAM  On exam she carries 132 pounds in a 5.6 inch frame. Pressure is 110/76 pulse of 84. EOMs full, sclerae and conjunctiva normal. PERRLA. Mask in place. Trachea midline with no neck masses. Assessment of internal jugular veins reveals no elevation of central venous pressure at 45 degrees. Carotid pulses normal without delay or bruit. Thyroid normal to palpation. Chest exam reveals normal respiratory effort, no abnormal breath sounds and normal expiratory phase. No skin lesions seen. PMI normal. S1, S2 normal without murmur or alma or click. Normal bowel sounds without palpable mass or bruit. No clubbing or acrocyanosis. No significant lower extremity edema or signs of venous insufficiency. General motor strength appears to be within normal limits. Normal range of motion with normal gait.  Alert, oriented x 3, memory and cognition normal as reflected by history and conversation. EKG reveals sinus rhythm without abnormality. ASSESSMENT/PLAN:   Dyslipidemia -unfortunately statin intolerant and LDL level not sufficient to warrant Repatha. Discussed diet with her and the need for regular exercise. Continue Zetia  Hypertension -reasonable control. Continue diltiazem 120 extended release  Autonomic dysfunction -history consistent with which but negative tilt table. Advised to maintain good hydration and compression stockings if needed. Pandemic response -vaccinated, deserves booster.

## 2022-09-30 ENCOUNTER — HOSPITAL ENCOUNTER (OUTPATIENT)
Dept: VASCULAR LAB | Age: 72
Discharge: HOME | End: 2022-09-30
Payer: MEDICARE

## 2022-09-30 DIAGNOSIS — M54.2 NECK PAIN ON LEFT SIDE: ICD-10-CM

## 2022-09-30 DIAGNOSIS — I70.90 ATHEROMATOUS PLAQUE: ICD-10-CM

## 2022-09-30 DIAGNOSIS — R55 NEAR SYNCOPE: ICD-10-CM

## 2022-09-30 PROCEDURE — 93880 EXTRACRANIAL BILAT STUDY: CPT

## 2022-10-06 ENCOUNTER — TELEPHONE (OUTPATIENT)
Dept: CARDIOLOGY CLINIC | Age: 72
End: 2022-10-06

## 2022-10-06 NOTE — TELEPHONE ENCOUNTER
Impression        There is mixed plaque visualized in the bilateral internal carotid    arteries. There is less than 50% stenosis in the right internal carotid artery. There is less than 50% stenosis of the left internal carotid artery. There is normal antegrade flow in the bilateral vertebral arteries. Patient called wanting results of carotid US. Please review and advise.

## 2022-10-18 ENCOUNTER — OFFICE VISIT (OUTPATIENT)
Dept: UROLOGY | Age: 72
End: 2022-10-18
Payer: MEDICARE

## 2022-10-18 VITALS — HEIGHT: 63 IN | BODY MASS INDEX: 28.7 KG/M2 | TEMPERATURE: 98 F | WEIGHT: 162 LBS

## 2022-10-18 DIAGNOSIS — N39.0 RECURRENT UTI: Primary | ICD-10-CM

## 2022-10-18 LAB
APPEARANCE FLUID: CLEAR
BILIRUBIN, POC: NORMAL
BLOOD URINE, POC: NORMAL
CLARITY, POC: CLEAR
COLOR, POC: YELLOW
GLUCOSE URINE, POC: NORMAL
KETONES, POC: NORMAL
LEUKOCYTE EST, POC: NORMAL
NITRITE, POC: NORMAL
PH, POC: 5.5
PROTEIN, POC: NORMAL
SPECIFIC GRAVITY, POC: 1.02
UROBILINOGEN, POC: 0.2

## 2022-10-18 PROCEDURE — G8417 CALC BMI ABV UP PARAM F/U: HCPCS | Performed by: NURSE PRACTITIONER

## 2022-10-18 PROCEDURE — 99212 OFFICE O/P EST SF 10 MIN: CPT | Performed by: NURSE PRACTITIONER

## 2022-10-18 PROCEDURE — 1090F PRES/ABSN URINE INCON ASSESS: CPT | Performed by: NURSE PRACTITIONER

## 2022-10-18 PROCEDURE — G8399 PT W/DXA RESULTS DOCUMENT: HCPCS | Performed by: NURSE PRACTITIONER

## 2022-10-18 PROCEDURE — G8427 DOCREV CUR MEDS BY ELIG CLIN: HCPCS | Performed by: NURSE PRACTITIONER

## 2022-10-18 PROCEDURE — 81002 URINALYSIS NONAUTO W/O SCOPE: CPT | Performed by: NURSE PRACTITIONER

## 2022-10-18 PROCEDURE — 1123F ACP DISCUSS/DSCN MKR DOCD: CPT | Performed by: NURSE PRACTITIONER

## 2022-10-18 PROCEDURE — 1036F TOBACCO NON-USER: CPT | Performed by: NURSE PRACTITIONER

## 2022-10-18 PROCEDURE — G8484 FLU IMMUNIZE NO ADMIN: HCPCS | Performed by: NURSE PRACTITIONER

## 2022-10-18 PROCEDURE — 3017F COLORECTAL CA SCREEN DOC REV: CPT | Performed by: NURSE PRACTITIONER

## 2022-10-18 RX ORDER — METOCLOPRAMIDE 10 MG/1
TABLET ORAL
COMMUNITY
Start: 2022-09-28

## 2022-10-18 RX ORDER — UBIDECARENONE 10 MG
CAPSULE ORAL
COMMUNITY

## 2022-10-18 ASSESSMENT — ENCOUNTER SYMPTOMS
ABDOMINAL PAIN: 0
BACK PAIN: 0
ABDOMINAL DISTENTION: 0
VOMITING: 0
NAUSEA: 0

## 2022-10-18 NOTE — PROGRESS NOTES
Myrtle Hernández is a 81 y.o., female, Established patient who presents today   Chief Complaint   Patient presents with    Follow-up     I am here here today for yearly recurrent UTI follow up        HPI   Patient presents for follow-up of recurrent urinary tract infection. She is currently maintained on topical estrogen cream as well as Ellura and postcoital Macrobid. She has been doing incredibly well on this therapy and has not had an infection in over a year. She has no complaints at this time and is currently asymptomatic. REVIEW OF SYSTEMS:  Review of Systems   Constitutional:  Negative for chills and fever. Gastrointestinal:  Negative for abdominal distention, abdominal pain, nausea and vomiting. Genitourinary:  Negative for difficulty urinating, dysuria, flank pain, frequency, hematuria and urgency. Musculoskeletal:  Negative for back pain and gait problem. Psychiatric/Behavioral:  Negative for agitation and confusion. PHYSICAL EXAM:  Temp 98 °F (36.7 °C) (Temporal)   Ht 5' 2.5\" (1.588 m)   Wt 162 lb (73.5 kg)   BMI 29.16 kg/m²   Physical Exam  Vitals and nursing note reviewed. Constitutional:       General: She is not in acute distress. Appearance: Normal appearance. She is not ill-appearing. Pulmonary:      Effort: Pulmonary effort is normal. No respiratory distress. Abdominal:      General: There is no distension. Tenderness: There is no abdominal tenderness. There is no right CVA tenderness or left CVA tenderness. Neurological:      Mental Status: She is alert and oriented to person, place, and time. Mental status is at baseline.    Psychiatric:         Mood and Affect: Mood normal.         Behavior: Behavior normal.       DATA:  Results for orders placed or performed in visit on 10/18/22   POCT Urinalysis no Micro   Result Value Ref Range    Color, UA yellow     Clarity, UA clear     Glucose, UA POC neg     Bilirubin, UA neg     Ketones, UA neg     Spec Grav, UA 1. 020     Blood, UA POC neg     pH, UA 5.5     Protein, UA POC neg     Urobilinogen, UA 0.2     Leukocytes, UA neg     Nitrite, UA neg     Appearance, Fluid Clear Clear, Slightly Cloudy       ASSESSMENT/PLAN  1. Recurrent UTI  Patient with recurrent urinary tract infections. Has been evaluated prior with CT urogram.  Currently asymptomatic and doing well on her current therapy. We will follow-up in 1 year. - POCT Urinalysis no Micro      Orders Placed This Encounter   Procedures    POCT Urinalysis no Micro        Return in about 1 year (around 10/18/2023) for with GABBY Conroy. An electronic signature was used to authenticate this note. GABBY MOORE - CNP    All information inputted into the note by the MA to include chief complaint, past medical history, past surgical history, medications, allergies, social and family history and review of systems has been reviewed and updated as needed by me. EMR Dragon/transcription disclaimer: Much of this document is electronic transcription/translation of spoken language to printed text. The electronic translation of spoken language may be erroneous or, at times, nonsensical words or phrases may be inadvertently transcribed.  Although I have reviewed the document for such errors, some may still exist.

## 2022-11-02 ENCOUNTER — TELEPHONE (OUTPATIENT)
Dept: CARDIOLOGY CLINIC | Age: 72
End: 2022-11-02

## 2022-11-02 NOTE — TELEPHONE ENCOUNTER
Date: 11-4-22    Cardiologist: Dr. Kaye Vasquez    Procedure: Lap Cholecystectomy    Surgeon: Dr. Clint Marte Office Visit: 9-21-22    Reason for office visit and medical concerns addressed at this office visit: HTN, CKD, PVD, CAD    Testing Performed and Date of Service:  EKG 9-21-22    RCRI = 1 pt, low, 0.9%   METs 4    Current Medications: reglan, cholecalciferol, Q10, cardizem, nitro, synthroid, armour, nitrofurantoin, zetia, ASA, valtrex, prilosec, magnesium    Is the patient currently taking an anticoagulant? If so, what is the diagnosis the patient has been given to warrant the need for the anticoagulant?  ASA    Additional Notes: Cardiac Risk Request and med hold on ASA

## 2022-11-04 ENCOUNTER — LAB REQUISITION (OUTPATIENT)
Dept: LAB | Facility: HOSPITAL | Age: 72
End: 2022-11-04

## 2022-11-04 DIAGNOSIS — Z00.00 ENCOUNTER FOR GENERAL ADULT MEDICAL EXAMINATION WITHOUT ABNORMAL FINDINGS: ICD-10-CM

## 2022-11-04 PROCEDURE — 88304 TISSUE EXAM BY PATHOLOGIST: CPT | Performed by: SURGERY

## 2023-01-04 ENCOUNTER — PRE-ADMISSION TESTING (OUTPATIENT)
Dept: PREADMISSION TESTING | Facility: HOSPITAL | Age: 73
End: 2023-01-04
Payer: MEDICARE

## 2023-01-04 VITALS
HEIGHT: 64 IN | HEART RATE: 71 BPM | RESPIRATION RATE: 16 BRPM | OXYGEN SATURATION: 98 % | DIASTOLIC BLOOD PRESSURE: 76 MMHG | BODY MASS INDEX: 26.84 KG/M2 | WEIGHT: 157.19 LBS | SYSTOLIC BLOOD PRESSURE: 147 MMHG

## 2023-01-04 LAB
ANION GAP SERPL CALCULATED.3IONS-SCNC: 11 MMOL/L (ref 5–15)
BUN SERPL-MCNC: 16 MG/DL (ref 8–23)
BUN/CREAT SERPL: 19.3 (ref 7–25)
CALCIUM SPEC-SCNC: 9.2 MG/DL (ref 8.6–10.5)
CHLORIDE SERPL-SCNC: 103 MMOL/L (ref 98–107)
CO2 SERPL-SCNC: 25 MMOL/L (ref 22–29)
CREAT SERPL-MCNC: 0.83 MG/DL (ref 0.57–1)
DEPRECATED RDW RBC AUTO: 43.8 FL (ref 37–54)
EGFRCR SERPLBLD CKD-EPI 2021: 75.9 ML/MIN/1.73
ERYTHROCYTE [DISTWIDTH] IN BLOOD BY AUTOMATED COUNT: 13.4 % (ref 12.3–15.4)
GLUCOSE SERPL-MCNC: 94 MG/DL (ref 65–99)
HCT VFR BLD AUTO: 42.7 % (ref 34–46.6)
HGB BLD-MCNC: 14.1 G/DL (ref 12–15.9)
MCH RBC QN AUTO: 29.2 PG (ref 26.6–33)
MCHC RBC AUTO-ENTMCNC: 33 G/DL (ref 31.5–35.7)
MCV RBC AUTO: 88.4 FL (ref 79–97)
PLATELET # BLD AUTO: 283 10*3/MM3 (ref 140–450)
PMV BLD AUTO: 9.3 FL (ref 6–12)
POTASSIUM SERPL-SCNC: 4.1 MMOL/L (ref 3.5–5.2)
RBC # BLD AUTO: 4.83 10*6/MM3 (ref 3.77–5.28)
SODIUM SERPL-SCNC: 139 MMOL/L (ref 136–145)
WBC NRBC COR # BLD: 9.63 10*3/MM3 (ref 3.4–10.8)

## 2023-01-04 PROCEDURE — 85027 COMPLETE CBC AUTOMATED: CPT

## 2023-01-04 PROCEDURE — 36415 COLL VENOUS BLD VENIPUNCTURE: CPT

## 2023-01-04 PROCEDURE — 80048 BASIC METABOLIC PNL TOTAL CA: CPT

## 2023-01-04 RX ORDER — PREGNENOLONE
POWDER (GRAM) MISCELLANEOUS
COMMUNITY

## 2023-01-04 RX ORDER — LEVOTHYROXINE AND LIOTHYRONINE 38; 9 UG/1; UG/1
60 TABLET ORAL DAILY
COMMUNITY

## 2023-01-04 RX ORDER — DULOXETIN HYDROCHLORIDE 20 MG/1
20 CAPSULE, DELAYED RELEASE ORAL DAILY
COMMUNITY

## 2023-01-04 RX ORDER — OMEPRAZOLE 40 MG/1
40 CAPSULE, DELAYED RELEASE ORAL DAILY
COMMUNITY

## 2023-01-04 RX ORDER — LEVOTHYROXINE SODIUM 0.05 MG/1
50 TABLET ORAL DAILY
COMMUNITY

## 2023-01-04 RX ORDER — EZETIMIBE 10 MG/1
10 TABLET ORAL DAILY
COMMUNITY

## 2023-01-04 RX ORDER — NITROGLYCERIN 0.4 MG/1
0.4 TABLET SUBLINGUAL
COMMUNITY

## 2023-01-04 RX ORDER — CRANBERRY FRUIT EXTRACT 200 MG
1 CAPSULE ORAL DAILY
COMMUNITY

## 2023-01-04 RX ORDER — DILTIAZEM HYDROCHLORIDE 120 MG/1
120 CAPSULE, COATED, EXTENDED RELEASE ORAL DAILY
COMMUNITY

## 2023-01-04 RX ORDER — CALCIUM CARBONATE/VITAMIN D3 600 MG-10
1 TABLET ORAL DAILY
COMMUNITY

## 2023-01-04 NOTE — DISCHARGE INSTRUCTIONS
Before you come to the hospital        Arrival time: AS DIRECTED BY OFFICE     YOU MAY TAKE THE FOLLOWING MEDICATION(S) THE MORNING OF SURGERY WITH A SIP OF WATER: NONE           ALL OTHER HOME MEDICATION CHECK WITH YOUR PHYSICIAN (especially if   you are taking diabetes medicines or blood thinners)    Do not take any Erectile Dysfunction medications (EX: CIALIS, VIAGRA) 24 hours prior to surgery.      If you were given and instructed to use a germ- killing soap, use as directed the night before surgery and again the morning of surgery or as directed by your surgeon. (Use one-half of the bottle with each shower.)   See attached information for How to Use Chlorhexidine for Bathing if applicable.            Eating and drinking restrictions prior to scheduled arrival time    2 Hours before arrival time STOP   Drinking Clear liquids (water, apple juice-no pulp)     6 Hours before arrival time STOP   Milk or drinks that contain milk, full liquids    6 Hours before arrival time STOP   Light meals or foods, such as toast or cereal    8 Hours before arrival time STOP   Heavy foods, such as meat, fried foods, or fatty foods    (It is extremely important that you follow these guidelines to prevent delay or cancelation of your procedure)     Clear Liquids  Water and flavored water                                                                      Clear Fruit juices, such as cranberry juice and apple juice.  Black coffee (NO cream of any kind, including powdered).  Plain tea  Clear bouillon or broth.  Flavored gelatin.  Soda.  Gatorade or Powerade.  Full liquid examples  Juices that have pulp.  Frozen ice pops that contain fruit pieces.  Coffee with creamer  Milk.  Yogurt.                MANAGING PAIN AFTER SURGERY    We know you are probably wondering what your pain will be like after surgery.  Following surgery it is unrealistic to expect you will not have pain.   Pain is how our bodies let us know that something is wrong  or cautions us to be careful.  That said, our goal is to make your pain tolerable.    Methods we may use to treat your pain include (oral or IV medications, PCAs, epidurals, nerve blocks, etc.)   While some procedures require IV pain medications for a short time after surgery, transitioning to pain medications by mouth allows for better management of pain.   Your nurse will encourage you to take oral pain medications whenever possible.  IV medications work almost immediately, but only last a short while.  Taking medications by mouth allows for a more constant level of medication in your blood stream for a longer period of time.      Once your pain is out of control it is harder to get back under control.  It is important you are aware when your next dose of pain medication is due.  If you are admitted, your nurse may write the time of your next dose on the white board in your room to help you remember.      We are interested in your pain and encourage you to inform us about aggravating factors during your visit.   Many times a simple repositioning every few hours can make a big difference.    If your physician says it is okay, do not let your pain prevent you from getting out of bed. Be sure to call your nurse for assistance prior to getting up so you do not fall.      Before surgery, please decide your tolerable pain goal.  These faces help describe the pain ratings we use on a 0-10 scale.   Be prepared to tell us your goal and whether or not you take pain or anxiety medications at home.          Preparing for Surgery  Preparing for surgery is an important part of your care. It can make things go more smoothly and help you avoid complications. The steps leading up to surgery may vary among hospitals. Follow all instructions given to you by your health care providers. Ask questions if you do not understand something. Talk about any concerns that you have.  Here are some questions to consider asking before your  surgery:  If my surgery is not an emergency (is elective), when would be the best time to have the surgery?  What arrangements do I need to make for work, home, or school?  What will my recovery be like? How long will it be before I can return to normal activities?  Will I need to prepare my home? Will I need to arrange care for me or my children?  Should I expect to have pain after surgery? What are my pain management options? Are there nonmedical options that I can try for pain?  Tell a health care provider about:  Any allergies you have.  All medicines you are taking, including vitamins, herbs, eye drops, creams, and over-the-counter medicines.  Any problems you or family members have had with anesthetic medicines.  Any blood disorders you have.  Any surgeries you have had.  Any medical conditions you have.  Whether you are pregnant or may be pregnant.  What are the risks?  The risks and complications of surgery depend on the specific procedure that you have. Discuss all the risks with your health care providers before your surgery. Ask about common surgical complications, which may include:  Infection.  Bleeding or a need for blood replacement (transfusion).  Allergic reactions to medicines.  Damage to surrounding nerves, tissues, or structures.  A blood clot.  Scarring.  Failure of the surgery to correct the problem.  Follow these instructions before the procedure:  Several days or weeks before your procedure  You may have a physical exam by your primary health care provider to make sure it is safe for you to have surgery.  You may have testing. This may include a chest X-ray, blood and urine tests, electrocardiogram (ECG), or other testing.  Ask your health care provider about:  Changing or stopping your regular medicines. This is especially important if you are taking diabetes medicines or blood thinners.  Taking medicines such as aspirin and ibuprofen. These medicines can thin your blood. Do not take these  medicines unless your health care provider tells you to take them.  Taking over-the-counter medicines, vitamins, herbs, and supplements.  Do not use any products that contain nicotine or tobacco, such as cigarettes and e-cigarettes. If you need help quitting, ask your health care provider.  Avoid alcohol.  Ask your health care provider if there are exercises you can do to prepare for surgery.  Eat a healthy diet.   Plan to have someone take you home from the hospital or clinic.  Plan to have a responsible adult care for you for at least 24 hours after you leave the hospital or clinic. This is important.  The day before your procedure  You may be given antibiotic medicine to take by mouth to help prevent infection. Take it as told by your health care provider.  You may be asked to shower with a germ-killing soap.  Follow instructions from your health care provider about eating and drinking restrictions. This includes gum, mints and hard candy.  Pack comfortable clothes according to your procedure.   The day of your procedure  You may need to take another shower with a germ-killing soap before you leave home in the morning.  With a small sip of water, take only the medicines that you are told to take.  Remove all jewelry including rings.   Leave anything you consider valuable at home except hearing aids if needed.  You do not need to bring your home medications into the hospital.   Do not wear any makeup, nail polish, powder, deodorant, lotion, hair accessories, or anything on your skin or body except your clothes.  If you will be staying in the hospital, bring a case to hold your glasses, contacts, or dentures. You may also want to bring your robe and non-skid footwear.       (Do not use denture adhesives since you will be asked to remove them during  surgery).   If you wear oxygen at home, bring it with you the day of surgery.  If instructed by your health care provider, bring your sleep apnea device with you on the  day of your surgery (if this applies to you).  You may want to leave your suitcase and sleep apnea device in the car until after surgery.   Arrive at the hospital as scheduled.  Bring a friend or family member with you who can help to answer questions and be present while you meet with your health care provider.  At the hospital  When you arrive at the hospital:  Go to registration located at the main entrance of the hospital. You will be registered and given a beeper and a sticker sheet. Take the stickers to the Outpatient nurses desk and place in the black tray. This is to notify staff that you have arrived. Then return to the lobby to wait.   When your beeper lights up and vibrates proceed through the double doors, under the stairs, and a member of the Outpatient Surgery staff will escort you to your preoperative room.  You may have to wear compression sleeves. These help to prevent blood clots and reduce swelling in your legs.  An IV may be inserted into one of your veins.              In the operating room, you may be given one or more of the following:        A medicine to help you relax (sedative).        A medicine to numb the area (local anesthetic).        A medicine to make you fall asleep (general anesthetic).        A medicine that is injected into an area of your body to numb everything below the                      injection site (regional anesthetic).  You may be given an antibiotic through your IV to help prevent infection.  Your surgical site will be marked or identified.    Contact a health care provider if you:  Develop a fever of more than 100.4°F (38°C) or other feelings of illness during the 48 hours before your surgery.  Have symptoms that get worse.  Have questions or concerns about your surgery.  Summary  Preparing for surgery can make the procedure go more smoothly and lower your risk of complications.  Before surgery, make a list of questions and concerns to discuss with your surgeon.  Ask about the risks and possible complications.  In the days or weeks before your surgery, follow all instructions from your health care provider. You may need to stop smoking, avoid alcohol, follow eating restrictions, and change or stop your regular medicines.  Contact your surgeon if you develop a fever or other signs of illness during the few days before your surgery.  This information is not intended to replace advice given to you by your health care provider. Make sure you discuss any questions you have with your health care provider.  Document Revised: 12/21/2018 Document Reviewed: 10/23/2018  Elsevier Patient Education © 2021 [employer]

## 2023-01-11 ENCOUNTER — ANESTHESIA (OUTPATIENT)
Dept: PERIOP | Facility: HOSPITAL | Age: 73
End: 2023-01-11
Payer: MEDICARE

## 2023-01-11 ENCOUNTER — HOSPITAL ENCOUNTER (OUTPATIENT)
Facility: HOSPITAL | Age: 73
Setting detail: HOSPITAL OUTPATIENT SURGERY
Discharge: HOME | End: 2023-01-11
Attending: ORTHOPAEDIC SURGERY | Admitting: ORTHOPAEDIC SURGERY
Payer: MEDICARE

## 2023-01-11 ENCOUNTER — ANESTHESIA EVENT (OUTPATIENT)
Dept: PERIOP | Facility: HOSPITAL | Age: 73
End: 2023-01-11
Payer: MEDICARE

## 2023-01-11 VITALS
SYSTOLIC BLOOD PRESSURE: 121 MMHG | OXYGEN SATURATION: 97 % | RESPIRATION RATE: 16 BRPM | DIASTOLIC BLOOD PRESSURE: 70 MMHG | HEART RATE: 63 BPM | TEMPERATURE: 98.2 F

## 2023-01-11 DIAGNOSIS — R22.32 MASS OF LEFT FINGER: ICD-10-CM

## 2023-01-11 PROCEDURE — 88307 TISSUE EXAM BY PATHOLOGIST: CPT | Performed by: ORTHOPAEDIC SURGERY

## 2023-01-11 PROCEDURE — 25010000002 CEFAZOLIN PER 500 MG: Performed by: ORTHOPAEDIC SURGERY

## 2023-01-11 PROCEDURE — 25010000002 DEXAMETHASONE PER 1 MG: Performed by: ANESTHESIOLOGY

## 2023-01-11 PROCEDURE — 25010000002 FENTANYL CITRATE (PF) 100 MCG/2ML SOLUTION: Performed by: NURSE ANESTHETIST, CERTIFIED REGISTERED

## 2023-01-11 PROCEDURE — 25010000002 PROPOFOL 10 MG/ML EMULSION: Performed by: NURSE ANESTHETIST, CERTIFIED REGISTERED

## 2023-01-11 RX ORDER — SCOLOPAMINE TRANSDERMAL SYSTEM 1 MG/1
1 PATCH, EXTENDED RELEASE TRANSDERMAL ONCE
Status: DISCONTINUED | OUTPATIENT
Start: 2023-01-11 | End: 2023-01-11 | Stop reason: HOSPADM

## 2023-01-11 RX ORDER — SODIUM CHLORIDE, SODIUM LACTATE, POTASSIUM CHLORIDE, CALCIUM CHLORIDE 600; 310; 30; 20 MG/100ML; MG/100ML; MG/100ML; MG/100ML
100 INJECTION, SOLUTION INTRAVENOUS CONTINUOUS PRN
Status: DISCONTINUED | OUTPATIENT
Start: 2023-01-11 | End: 2023-01-11 | Stop reason: HOSPADM

## 2023-01-11 RX ORDER — SODIUM CHLORIDE 0.9 % (FLUSH) 0.9 %
10 SYRINGE (ML) INJECTION EVERY 12 HOURS SCHEDULED
Status: DISCONTINUED | OUTPATIENT
Start: 2023-01-11 | End: 2023-01-11 | Stop reason: HOSPADM

## 2023-01-11 RX ORDER — SODIUM CHLORIDE 9 MG/ML
40 INJECTION, SOLUTION INTRAVENOUS AS NEEDED
Status: DISCONTINUED | OUTPATIENT
Start: 2023-01-11 | End: 2023-01-11 | Stop reason: HOSPADM

## 2023-01-11 RX ORDER — LABETALOL HYDROCHLORIDE 5 MG/ML
5 INJECTION, SOLUTION INTRAVENOUS
Status: DISCONTINUED | OUTPATIENT
Start: 2023-01-11 | End: 2023-01-11 | Stop reason: HOSPADM

## 2023-01-11 RX ORDER — LIDOCAINE HYDROCHLORIDE 10 MG/ML
0.5 INJECTION, SOLUTION EPIDURAL; INFILTRATION; INTRACAUDAL; PERINEURAL ONCE AS NEEDED
Status: DISCONTINUED | OUTPATIENT
Start: 2023-01-11 | End: 2023-01-11 | Stop reason: HOSPADM

## 2023-01-11 RX ORDER — IBUPROFEN 600 MG/1
600 TABLET ORAL ONCE AS NEEDED
Status: DISCONTINUED | OUTPATIENT
Start: 2023-01-11 | End: 2023-01-11 | Stop reason: HOSPADM

## 2023-01-11 RX ORDER — SODIUM CHLORIDE 0.9 % (FLUSH) 0.9 %
10 SYRINGE (ML) INJECTION AS NEEDED
Status: DISCONTINUED | OUTPATIENT
Start: 2023-01-11 | End: 2023-01-11 | Stop reason: HOSPADM

## 2023-01-11 RX ORDER — LIDOCAINE HYDROCHLORIDE 20 MG/ML
INJECTION, SOLUTION EPIDURAL; INFILTRATION; INTRACAUDAL; PERINEURAL AS NEEDED
Status: DISCONTINUED | OUTPATIENT
Start: 2023-01-11 | End: 2023-01-11 | Stop reason: SURG

## 2023-01-11 RX ORDER — OXYCODONE AND ACETAMINOPHEN 10; 325 MG/1; MG/1
1 TABLET ORAL ONCE AS NEEDED
Status: DISCONTINUED | OUTPATIENT
Start: 2023-01-11 | End: 2023-01-11 | Stop reason: HOSPADM

## 2023-01-11 RX ORDER — DROPERIDOL 2.5 MG/ML
0.62 INJECTION, SOLUTION INTRAMUSCULAR; INTRAVENOUS ONCE AS NEEDED
Status: DISCONTINUED | OUTPATIENT
Start: 2023-01-11 | End: 2023-01-11 | Stop reason: HOSPADM

## 2023-01-11 RX ORDER — FENTANYL CITRATE 50 UG/ML
INJECTION, SOLUTION INTRAMUSCULAR; INTRAVENOUS AS NEEDED
Status: DISCONTINUED | OUTPATIENT
Start: 2023-01-11 | End: 2023-01-11 | Stop reason: SURG

## 2023-01-11 RX ORDER — OXYCODONE AND ACETAMINOPHEN 7.5; 325 MG/1; MG/1
2 TABLET ORAL EVERY 4 HOURS PRN
Status: DISCONTINUED | OUTPATIENT
Start: 2023-01-11 | End: 2023-01-11 | Stop reason: HOSPADM

## 2023-01-11 RX ORDER — FENTANYL CITRATE 50 UG/ML
25 INJECTION, SOLUTION INTRAMUSCULAR; INTRAVENOUS
Status: DISCONTINUED | OUTPATIENT
Start: 2023-01-11 | End: 2023-01-11 | Stop reason: HOSPADM

## 2023-01-11 RX ORDER — LIDOCAINE HYDROCHLORIDE AND EPINEPHRINE 10; 10 MG/ML; UG/ML
INJECTION, SOLUTION INFILTRATION; PERINEURAL AS NEEDED
Status: DISCONTINUED | OUTPATIENT
Start: 2023-01-11 | End: 2023-01-11 | Stop reason: HOSPADM

## 2023-01-11 RX ORDER — PROPOFOL 10 MG/ML
VIAL (ML) INTRAVENOUS AS NEEDED
Status: DISCONTINUED | OUTPATIENT
Start: 2023-01-11 | End: 2023-01-11 | Stop reason: SURG

## 2023-01-11 RX ORDER — SCOLOPAMINE TRANSDERMAL SYSTEM 1 MG/1
1 PATCH, EXTENDED RELEASE TRANSDERMAL ONCE
Status: DISCONTINUED | OUTPATIENT
Start: 2023-01-11 | End: 2023-01-11

## 2023-01-11 RX ORDER — DEXTROSE MONOHYDRATE 25 G/50ML
12.5 INJECTION, SOLUTION INTRAVENOUS AS NEEDED
Status: DISCONTINUED | OUTPATIENT
Start: 2023-01-11 | End: 2023-01-11 | Stop reason: HOSPADM

## 2023-01-11 RX ORDER — ONDANSETRON 2 MG/ML
4 INJECTION INTRAMUSCULAR; INTRAVENOUS ONCE AS NEEDED
Status: DISCONTINUED | OUTPATIENT
Start: 2023-01-11 | End: 2023-01-11 | Stop reason: HOSPADM

## 2023-01-11 RX ORDER — DEXAMETHASONE SODIUM PHOSPHATE 4 MG/ML
4 INJECTION, SOLUTION INTRA-ARTICULAR; INTRALESIONAL; INTRAMUSCULAR; INTRAVENOUS; SOFT TISSUE ONCE AS NEEDED
Status: COMPLETED | OUTPATIENT
Start: 2023-01-11 | End: 2023-01-11

## 2023-01-11 RX ORDER — NALOXONE HCL 0.4 MG/ML
0.4 VIAL (ML) INJECTION AS NEEDED
Status: DISCONTINUED | OUTPATIENT
Start: 2023-01-11 | End: 2023-01-11 | Stop reason: HOSPADM

## 2023-01-11 RX ORDER — MAGNESIUM HYDROXIDE 1200 MG/15ML
LIQUID ORAL AS NEEDED
Status: DISCONTINUED | OUTPATIENT
Start: 2023-01-11 | End: 2023-01-11 | Stop reason: HOSPADM

## 2023-01-11 RX ORDER — LIDOCAINE HYDROCHLORIDE 10 MG/ML
0.5 INJECTION, SOLUTION EPIDURAL; INFILTRATION; INTRACAUDAL; PERINEURAL ONCE AS NEEDED
Status: DISCONTINUED | OUTPATIENT
Start: 2023-01-11 | End: 2023-01-11 | Stop reason: SDUPTHER

## 2023-01-11 RX ORDER — FLUMAZENIL 0.1 MG/ML
0.2 INJECTION INTRAVENOUS AS NEEDED
Status: DISCONTINUED | OUTPATIENT
Start: 2023-01-11 | End: 2023-01-11 | Stop reason: HOSPADM

## 2023-01-11 RX ORDER — SODIUM CHLORIDE, SODIUM LACTATE, POTASSIUM CHLORIDE, CALCIUM CHLORIDE 600; 310; 30; 20 MG/100ML; MG/100ML; MG/100ML; MG/100ML
9 INJECTION, SOLUTION INTRAVENOUS CONTINUOUS
Status: DISCONTINUED | OUTPATIENT
Start: 2023-01-11 | End: 2023-01-11 | Stop reason: HOSPADM

## 2023-01-11 RX ADMIN — SODIUM CHLORIDE, POTASSIUM CHLORIDE, SODIUM LACTATE AND CALCIUM CHLORIDE 100 ML/HR: 600; 310; 30; 20 INJECTION, SOLUTION INTRAVENOUS at 08:05

## 2023-01-11 RX ADMIN — FENTANYL CITRATE 100 MCG: 50 INJECTION INTRAMUSCULAR; INTRAVENOUS at 09:09

## 2023-01-11 RX ADMIN — PROPOFOL INJECTABLE EMULSION 20 MG: 10 INJECTION, EMULSION INTRAVENOUS at 09:09

## 2023-01-11 RX ADMIN — LIDOCAINE HYDROCHLORIDE 100 MG: 20 INJECTION, SOLUTION EPIDURAL; INFILTRATION; INTRACAUDAL; PERINEURAL at 09:09

## 2023-01-11 RX ADMIN — SCOPALAMINE 1 PATCH: 1 PATCH, EXTENDED RELEASE TRANSDERMAL at 08:43

## 2023-01-11 RX ADMIN — PROPOFOL INJECTABLE EMULSION 30 MG: 10 INJECTION, EMULSION INTRAVENOUS at 09:12

## 2023-01-11 RX ADMIN — DEXAMETHASONE SODIUM PHOSPHATE 4 MG: 4 INJECTION INTRA-ARTICULAR; INTRALESIONAL; INTRAMUSCULAR; INTRAVENOUS; SOFT TISSUE at 08:37

## 2023-01-11 RX ADMIN — PROPOFOL INJECTABLE EMULSION 75 MCG/KG/MIN: 10 INJECTION, EMULSION INTRAVENOUS at 09:13

## 2023-01-11 NOTE — OP NOTE
Patient Name: Jodie  : 1950  MRN: [id number]    DATE of SURGERY: 2023    SURGEON: Klever Gonzalez MD    ASSISTANT: NONE    PREOPERATIVE DIAGNOSES:   1.  Left ring finger dorsal mass overlying PIP joint  2.  Hypertension   3.  Hypercholesterolemia  4.  Thyroid disorder   5.  GERD      POSTOPERATIVE DIAGNOSES:   1.  Left ring finger dorsal mass overlying PIP joint  2.  Hypertension   3.  Hypercholesterolemia  4.  Thyroid disorder   5.  GERD     PROCEDURE PERFORMED:   1.  Left ring finger marginal mass excision, proximal interphalangeal joint arthrotomy      IMPLANTS  None.      ANESTHESIA    Monitored anesthesia care with local anesthetic      OPERATIVE INDICATIONS    This patient is 72 y.o. female who presented to my clinic with complaints of a mass overlying the dorsal aspect of the left ring finger PIP joint.  Mass had become progressively larger and more problematic to the point that she wished to have the mass excised.  Risks including, but not limited to, bleeding, infection, wound healing problems, need for additional procedures, mass recurrence, injury to surrounding structures with subsequent finger stiffness/dysfunction, adhesions were all discussed.  All questions were answered preoperatively.  Written and informed consent were obtained.      ESTIMATED BLOOD LOSS    Less than 10 mL.      SPECIMENS  1.  Soft tissue mass from dorsal aspect of left ring finger, measured 5 x 3 mm      DRAINS  None.      COMPLICATIONS    None.      PROCEDURE IN DETAIL  The patient was seen in the preoperative holding room where the correct patient, procedure and side were confirmed.  The operative site was marked by myself with the patient's agreement.  The consent was signed by myself. The patient was transported to the operating room, where a timeout was performed, identifying the correct patient, procedure and operative site.  Dose appropriate antibiotics were given as perioperative antibiotics.  A  nonsterile tourniquet was applied to the operative brachium.  The operative extremity was prepped and draped in a normal sterile fashion.  Skin marker was used to delineate a curvilinear incision overlying the dorsal aspect of the left ring finger PIP joint.  Prior to inflation of the tourniquet, a local block was administered utilizing 9 cc of 1% lidocaine with epinephrine.  Left upper extremity was then exsanguinated with an Esmarch and the tourniquet was inflated to 250 mmHg for less than 30 minutes.  15 blade scalpel was used to incise only through the skin.  Full-thickness skin flap was elevated.  Longitudinal veins were preserved and crossing veins were cauterized.  The mass was readily identified.  It was homogenous and cystic in nature.  It was circumferentially dissected and noted to likely be emanating from the proximal interphalangeal joint.  The mass was excised en bloc and sent for permanent specimen, it measured approximately 5 x 3 mm.  A small stalk appeared to be emanating from the PIP joint.  Therefore, a longitudinal PIP joint capsulotomy between the central slip and ulnar lateral band was made.  PIP joint was inspected with no residual mass remaining.  No significant degenerative chondromalacia was noted.  The wound was irrigated with normal saline.  The arthrotomy was reapproximated with buried Monocryl suture.  Skin edges were approximated with nylon suture.  Soft dressing was applied to the left ring finger.  Tourniquet was deflated and adequate capillary refill was retained to the ring finger and remaining digits.  Sedation was reversed, she was transferred to hospital bed and moved to PACU in stable condition.  All counts the end the procedure were correct.  She tolerated the procedure well and was without intraoperative complication.        POSTOPERATIVE PLAN    1.  Discharged home with family.     2.  Follow up in 2 weeks for a clinical check

## 2023-01-11 NOTE — ANESTHESIA POSTPROCEDURE EVALUATION
Patient: Maritza Mobley    Procedure Summary     Date: 01/11/23 Room / Location:  [facility]  /  [facility]    Anesthesia Start: 0907 Anesthesia Stop: 0953    Procedure: LEFT RING FINGER MASS EXCISION (Left: Finger Ring) Diagnosis: (M67.449)    Surgeons: Klever Gonzalez MD Provider: Leandro Sol CRNA    Anesthesia Type: MAC ASA Status: 2          Anesthesia Type: MAC    Vitals  Vitals Value Taken Time   /66 01/11/23 0951   Temp     Pulse 64 01/11/23 0953   Resp 14 01/11/23 0945   SpO2 96 % 01/11/23 0950   Vitals shown include unvalidated device data.        Post Anesthesia Care and Evaluation    Patient location during evaluation: PHASE II  Patient participation: complete - patient participated  Level of consciousness: awake and alert  Pain score: 0  Pain management: adequate    Airway patency: patent  Anesthetic complications: No anesthetic complications  PONV Status: none  Cardiovascular status: acceptable  Respiratory status: acceptable  Hydration status: acceptable    Comments: Blood pressure 107/63, pulse 69, temperature 98.2 °F (36.8 °C), temperature source Temporal, resp. rate 14, SpO2 97 %.

## 2023-01-11 NOTE — ANESTHESIA PREPROCEDURE EVALUATION
Anesthesia Evaluation     Patient summary reviewed   history of anesthetic complications: PONV  NPO Solid Status: > 8 hours             Airway   Mallampati: II  Dental    (+) upper dentures and lower dentures    Pulmonary    (-) COPD, asthma, sleep apnea, not a smoker  Cardiovascular   Exercise tolerance: good (4-7 METS)    (+) hypertension,   (-) pacemaker, past MI, angina, cardiac stents      Neuro/Psych  (-) seizures, TIA, CVA  GI/Hepatic/Renal/Endo    (+)  GERD,  renal disease CRI, thyroid problem hypothyroidism  (-) liver disease, diabetes    Musculoskeletal     Abdominal    Substance History      OB/GYN          Other                        Anesthesia Plan    ASA 2     MAC     intravenous induction     Anesthetic plan, risks, benefits, and alternatives have been provided, discussed and informed consent has been obtained with: patient.        CODE STATUS:

## 2023-01-11 NOTE — DISCHARGE INSTRUCTIONS
1.  Elevate operative upper extremity.  Encourage gentle finger range of motion.  2.  No lifting greater than 5-10 pounds with the operative hand until follow-up.  3.  May remove dressing on postoperative day 3 and begin gentle hand hygiene.  Refrain from soaking the wound in water or applying any lotions or ointments.  4.  Pain medication as prescribed.  No additional Tylenol, alcohol or driving while on opioid pain medication.  Wean off pain medication as able.  5.  Return to clinic in 2 weeks for wound check and likely suture removal.  6.  Call our clinic number in the interim with any questions or concerns.    

## 2023-01-12 LAB
CYTO UR: NORMAL
LAB AP CASE REPORT: NORMAL
Lab: NORMAL
PATH REPORT.FINAL DX SPEC: NORMAL
PATH REPORT.GROSS SPEC: NORMAL

## 2023-03-21 ENCOUNTER — OFFICE VISIT (OUTPATIENT)
Dept: CARDIOLOGY CLINIC | Age: 73
End: 2023-03-21
Payer: MEDICARE

## 2023-03-21 VITALS
OXYGEN SATURATION: 98 % | SYSTOLIC BLOOD PRESSURE: 130 MMHG | HEART RATE: 83 BPM | WEIGHT: 157 LBS | HEIGHT: 62 IN | BODY MASS INDEX: 28.89 KG/M2 | DIASTOLIC BLOOD PRESSURE: 70 MMHG

## 2023-03-21 DIAGNOSIS — E78.2 COMBINED HYPERLIPIDEMIA: ICD-10-CM

## 2023-03-21 DIAGNOSIS — I10 ESSENTIAL HYPERTENSION: ICD-10-CM

## 2023-03-21 DIAGNOSIS — G90.9 AUTONOMIC DYSFUNCTION: Primary | ICD-10-CM

## 2023-03-21 PROCEDURE — G8427 DOCREV CUR MEDS BY ELIG CLIN: HCPCS | Performed by: CLINICAL NURSE SPECIALIST

## 2023-03-21 PROCEDURE — 1123F ACP DISCUSS/DSCN MKR DOCD: CPT | Performed by: CLINICAL NURSE SPECIALIST

## 2023-03-21 PROCEDURE — 1090F PRES/ABSN URINE INCON ASSESS: CPT | Performed by: CLINICAL NURSE SPECIALIST

## 2023-03-21 PROCEDURE — G8417 CALC BMI ABV UP PARAM F/U: HCPCS | Performed by: CLINICAL NURSE SPECIALIST

## 2023-03-21 PROCEDURE — 3017F COLORECTAL CA SCREEN DOC REV: CPT | Performed by: CLINICAL NURSE SPECIALIST

## 2023-03-21 PROCEDURE — 3075F SYST BP GE 130 - 139MM HG: CPT | Performed by: CLINICAL NURSE SPECIALIST

## 2023-03-21 PROCEDURE — G8484 FLU IMMUNIZE NO ADMIN: HCPCS | Performed by: CLINICAL NURSE SPECIALIST

## 2023-03-21 PROCEDURE — 3078F DIAST BP <80 MM HG: CPT | Performed by: CLINICAL NURSE SPECIALIST

## 2023-03-21 PROCEDURE — 1036F TOBACCO NON-USER: CPT | Performed by: CLINICAL NURSE SPECIALIST

## 2023-03-21 PROCEDURE — G8399 PT W/DXA RESULTS DOCUMENT: HCPCS | Performed by: CLINICAL NURSE SPECIALIST

## 2023-03-21 PROCEDURE — 99213 OFFICE O/P EST LOW 20 MIN: CPT | Performed by: CLINICAL NURSE SPECIALIST

## 2023-03-21 RX ORDER — EZETIMIBE 10 MG/1
10 TABLET ORAL DAILY
Qty: 90 TABLET | Refills: 3 | Status: SHIPPED | OUTPATIENT
Start: 2023-03-21

## 2023-03-21 RX ORDER — NITROGLYCERIN 0.4 MG/1
0.4 TABLET SUBLINGUAL EVERY 5 MIN PRN
Qty: 25 TABLET | Refills: 3 | Status: CANCELLED | OUTPATIENT
Start: 2023-03-21

## 2023-03-21 NOTE — PROGRESS NOTES
2018    Left lower quadrant abdominal pain of unknown etiology 2018    Diarrhea 2018    Fatigue 2017    Dizziness 2017    Weakness 2017    Cervicalgia 2016    Spondylosis of cervical region without myelopathy or radiculopathy 2016    Lumbosacral spondylosis without myelopathy 05/10/2012     Past Medical History:   Diagnosis Date    Adrenal insufficiency ([facility] [address]     Autonomic nervous system disease or syndrome     CFS (chronic fatigue syndrome)     GERD (gastroesophageal reflux disease)     Hypertension     Kidney disease     third stage    Thyroid condition      Past Surgical History:   Procedure Laterality Date    APPENDECTOMY      CARDIAC CATHETERIZATION       SECTION      x2    CHOLECYSTECTOMY      COLONOSCOPY      COLONOSCOPY N/A 2018    Dr Kath Haddad AP (-) dysplasia x 1, HP x 2, BCM x 1--5 yr recall    FINGER SURGERY Left 2023    HYSTERECTOMY (CERVIX STATUS UNKNOWN)      NECK SURGERY      OVARY REMOVAL      [place] EGD TRANSORAL BIOPSY SINGLE/MULTIPLE N/A 2018    Dr Quincy Chan- [place] (-)    UPPER GASTROINTESTINAL ENDOSCOPY       Family History   Problem Relation Age of Onset    Colon Polyps Sister     Stomach Cancer Brother     Coronary Art Dis Mother     Dementia Father     Colon Cancer Niece     Liver Cancer Neg Hx     Liver Disease Neg Hx     Esophageal Cancer Neg Hx     Rectal Cancer Neg Hx      Social History     Tobacco Use    Smoking status: Never    Smokeless tobacco: Never   Substance Use Topics    Alcohol use: No      Current Outpatient Medications   Medication Sig Dispense Refill    ezetimibe (ZETIA) 10 MG tablet Take 1 tablet by mouth daily 90 tablet 3    Coenzyme Q10 10 MG CAPS Take by mouth      dilTIAZem (CARDIZEM 12 HR) 120 MG extended release capsule Take 120 mg by mouth daily      nitroGLYCERIN (NITROSTAT) 0.4 MG SL tablet Place 1 tablet under the tongue every 5 minutes as needed for Chest pain 25 tablet 3    levothyroxine

## 2023-03-21 NOTE — PATIENT INSTRUCTIONS
Labs soon   Maintain good blood pressure control-goal<130/80 at rest  Maintain good cholesterol control LDL goal<70 with arterial disease  If you are diabetic work to keep/obtain hemoglobin A1c< 7    Follow up in  9 mos with DR Fozia Deng   Call with any questions or concerns  Follow up with Rosa Jasso MD for non cardiac problems  Report any new problems  Cardiovascular Fitness-Exercise as tolerated. Strive for 30 minutes of exercise most days of the week. Cardiac / Healthy Diet- Avoid processed high fat foods, maintain low sodium/salt   Continue current medications as directed  Continue plan of treatment  It is always recommended that you bring your medications bottles with you to each visit - this is for your safety!

## 2023-04-18 DIAGNOSIS — G90.9 AUTONOMIC DYSFUNCTION: ICD-10-CM

## 2023-04-18 DIAGNOSIS — R00.2 PALPITATIONS: Primary | ICD-10-CM

## 2023-04-18 RX ORDER — DILTIAZEM HYDROCHLORIDE 120 MG/1
120 CAPSULE, EXTENDED RELEASE ORAL DAILY
Qty: 90 CAPSULE | Refills: 3 | Status: SHIPPED | OUTPATIENT
Start: 2023-04-18 | End: 2023-04-21 | Stop reason: SDUPTHER

## 2023-04-21 DIAGNOSIS — R00.2 PALPITATIONS: ICD-10-CM

## 2023-04-21 DIAGNOSIS — G90.9 AUTONOMIC DYSFUNCTION: ICD-10-CM

## 2023-04-21 RX ORDER — DILTIAZEM HYDROCHLORIDE 120 MG/1
120 CAPSULE, EXTENDED RELEASE ORAL DAILY
Qty: 90 CAPSULE | Refills: 3 | Status: SHIPPED | OUTPATIENT
Start: 2023-04-21

## 2023-09-25 DIAGNOSIS — N39.0 RECURRENT UTI: ICD-10-CM

## 2023-09-25 RX ORDER — NITROFURANTOIN MACROCRYSTALS 100 MG/1
100 CAPSULE ORAL PRN
Qty: 30 CAPSULE | Refills: 2 | Status: SHIPPED | OUTPATIENT
Start: 2023-09-25

## 2023-10-03 ENCOUNTER — TELEPHONE (OUTPATIENT)
Dept: CARDIOLOGY CLINIC | Age: 73
End: 2023-10-03

## 2023-11-13 ENCOUNTER — OFFICE VISIT (OUTPATIENT)
Dept: UROLOGY | Age: 73
End: 2023-11-13
Payer: MEDICARE

## 2023-11-13 VITALS — WEIGHT: 163 LBS | HEIGHT: 62 IN | BODY MASS INDEX: 30 KG/M2 | TEMPERATURE: 97.2 F

## 2023-11-13 DIAGNOSIS — N39.0 RECURRENT UTI: Primary | ICD-10-CM

## 2023-11-13 LAB
BACTERIA URINE, POC: 0
BILIRUBIN URINE: 0 MG/DL
BLOOD, URINE: POSITIVE
CASTS URINE, POC: 0
CLARITY: CLEAR
COLOR: YELLOW
CRYSTALS URINE, POC: 0
EPI CELLS URINE, POC: NORMAL
GLUCOSE URINE: NORMAL
KETONES, URINE: NEGATIVE
LEUKOCYTE EST, POC: NORMAL
NITRITE, URINE: NEGATIVE
PH UA: 6 (ref 4.5–8)
PROTEIN UA: NEGATIVE
RBC URINE, POC: 0
SPECIFIC GRAVITY UA: 1.01 (ref 1–1.03)
UROBILINOGEN, URINE: NORMAL
WBC URINE, POC: 1
YEAST URINE, POC: 0

## 2023-11-13 PROCEDURE — 99203 OFFICE O/P NEW LOW 30 MIN: CPT | Performed by: NURSE PRACTITIONER

## 2023-11-13 PROCEDURE — 3017F COLORECTAL CA SCREEN DOC REV: CPT | Performed by: NURSE PRACTITIONER

## 2023-11-13 PROCEDURE — 81001 URINALYSIS AUTO W/SCOPE: CPT | Performed by: NURSE PRACTITIONER

## 2023-11-13 PROCEDURE — 1123F ACP DISCUSS/DSCN MKR DOCD: CPT | Performed by: NURSE PRACTITIONER

## 2023-11-13 PROCEDURE — 1036F TOBACCO NON-USER: CPT | Performed by: NURSE PRACTITIONER

## 2023-11-13 PROCEDURE — 1090F PRES/ABSN URINE INCON ASSESS: CPT | Performed by: NURSE PRACTITIONER

## 2023-11-13 PROCEDURE — G8484 FLU IMMUNIZE NO ADMIN: HCPCS | Performed by: NURSE PRACTITIONER

## 2023-11-13 PROCEDURE — G8417 CALC BMI ABV UP PARAM F/U: HCPCS | Performed by: NURSE PRACTITIONER

## 2023-11-13 PROCEDURE — G8399 PT W/DXA RESULTS DOCUMENT: HCPCS | Performed by: NURSE PRACTITIONER

## 2023-11-13 PROCEDURE — G8427 DOCREV CUR MEDS BY ELIG CLIN: HCPCS | Performed by: NURSE PRACTITIONER

## 2023-11-13 RX ORDER — CRANBERRY FRUIT EXTRACT 200 MG
200 CAPSULE ORAL DAILY
Qty: 90 CAPSULE | Refills: 3 | Status: SHIPPED | OUTPATIENT
Start: 2023-11-13

## 2023-11-13 NOTE — PROGRESS NOTES
Lavelle Weston is a 72 y.o. female who presents today   Chief Complaint   Patient presents with    Follow-up     I am here today for my 1 year UTI follow up      Patient is a 53-year-old female who presents to clinic today for follow-up recurrent UTIs. She is currently maintained on topical estrogen, Ellura, and postcoital Macrodantin. She is utilizing the [email or web address] maybe once every 2 weeks. Occasionally forgets to use her vaginal estrogen. She has not had any UTIs within the last 2 years. Denies any symptoms today.   She has been evaluated prior with a CT urogram.    Past Medical History:   Diagnosis Date    Adrenal insufficiency (HCC)     Autonomic nervous system disease or syndrome     CFS (chronic fatigue syndrome)     GERD (gastroesophageal reflux disease)     Hypertension     Kidney disease     third stage    Thyroid condition        Past Surgical History:   Procedure Laterality Date    APPENDECTOMY      CARDIAC CATHETERIZATION       SECTION      x2    CHOLECYSTECTOMY      COLONOSCOPY      COLONOSCOPY N/A 2018    Dr Jyoti BROWN (-) dysplasia x 1, HP x 2, BCM x 1--5 yr recall    FINGER SURGERY Left 2023    HYSTERECTOMY (CERVIX STATUS UNKNOWN)      NECK SURGERY      OVARY REMOVAL      [place] EGD TRANSORAL BIOPSY SINGLE/MULTIPLE N/A 2018    Dr Gertrude Moreno- [place] (-)    UPPER GASTROINTESTINAL ENDOSCOPY         Current Outpatient Medications   Medication Sig Dispense Refill    Est Estrogens-Methyltest (COVARYX PO) estrogens-methyltestosterone   Compound Cream - 1 application daily      Cranberry (ELLURA) 200 MG CAPS Take 200 mg by mouth daily 90 capsule 3    nitrofurantoin (MACRODANTIN) 100 MG capsule Take 1 capsule by mouth as needed (post coital) 30 capsule 2    dilTIAZem (CARDIZEM 12 HR) 120 MG extended release capsule Take 1 capsule by mouth daily 90 capsule 3    nitroGLYCERIN (NITROSTAT) 0.4 MG SL tablet Place 1 tablet under the tongue every 5 minutes as needed for Chest pain 25

## 2023-11-14 ASSESSMENT — ENCOUNTER SYMPTOMS
NAUSEA: 0
VOMITING: 0
ABDOMINAL PAIN: 0
ABDOMINAL DISTENTION: 0
BACK PAIN: 0

## 2024-01-11 ENCOUNTER — LAB (OUTPATIENT)
Dept: LAB | Facility: HOSPITAL | Age: 74
End: 2024-01-11
Payer: MEDICARE

## 2024-01-11 ENCOUNTER — TRANSCRIBE ORDERS (OUTPATIENT)
Dept: ADMINISTRATIVE | Facility: HOSPITAL | Age: 74
End: 2024-01-11
Payer: MEDICARE

## 2024-01-11 DIAGNOSIS — Z87.440 PERSONAL HISTORY UTI: ICD-10-CM

## 2024-01-11 DIAGNOSIS — I10 ESSENTIAL (PRIMARY) HYPERTENSION: ICD-10-CM

## 2024-01-11 DIAGNOSIS — N18.30 STAGE 3 CHRONIC KIDNEY DISEASE, UNSPECIFIED WHETHER STAGE 3A OR 3B CKD: ICD-10-CM

## 2024-01-11 DIAGNOSIS — D50.9 IRON DEFICIENCY ANEMIA, UNSPECIFIED IRON DEFICIENCY ANEMIA TYPE: Primary | ICD-10-CM

## 2024-01-11 DIAGNOSIS — G90.9 DISORDER OF AUTONOMIC NERVOUS SYSTEM: ICD-10-CM

## 2024-01-11 DIAGNOSIS — M76.822 POSTERIOR TIBIAL TENDINITIS, LEFT LEG: ICD-10-CM

## 2024-01-11 DIAGNOSIS — M76.822 POSTERIOR TIBIAL TENDINITIS OF LEFT LEG: ICD-10-CM

## 2024-01-11 DIAGNOSIS — D50.9 IRON DEFICIENCY ANEMIA, UNSPECIFIED IRON DEFICIENCY ANEMIA TYPE: ICD-10-CM

## 2024-01-11 DIAGNOSIS — Z01.812 ENCOUNTER FOR PREPROCEDURAL LABORATORY EXAMINATION: ICD-10-CM

## 2024-01-11 LAB
ALBUMIN SERPL-MCNC: 4 G/DL (ref 3.5–5.2)
ALBUMIN/GLOB SERPL: 1.4 G/DL
ALP SERPL-CCNC: 58 U/L (ref 39–117)
ALT SERPL W P-5'-P-CCNC: 17 U/L (ref 1–33)
ANION GAP SERPL CALCULATED.3IONS-SCNC: 11 MMOL/L (ref 5–15)
AST SERPL-CCNC: 22 U/L (ref 1–32)
BASOPHILS # BLD AUTO: 0.09 10*3/MM3 (ref 0–0.2)
BASOPHILS NFR BLD AUTO: 0.9 % (ref 0–1.5)
BILIRUB SERPL-MCNC: 0.3 MG/DL (ref 0–1.2)
BILIRUB UR QL STRIP: NEGATIVE
BUN SERPL-MCNC: 13 MG/DL (ref 8–23)
BUN/CREAT SERPL: 15.5 (ref 7–25)
CALCIUM SPEC-SCNC: 9.2 MG/DL (ref 8.6–10.5)
CHLORIDE SERPL-SCNC: 103 MMOL/L (ref 98–107)
CLARITY UR: CLEAR
CO2 SERPL-SCNC: 26 MMOL/L (ref 22–29)
COLOR UR: YELLOW
CREAT SERPL-MCNC: 0.84 MG/DL (ref 0.57–1)
DEPRECATED RDW RBC AUTO: 45.4 FL (ref 37–54)
EGFRCR SERPLBLD CKD-EPI 2021: 74.4 ML/MIN/1.73
EOSINOPHIL # BLD AUTO: 0.39 10*3/MM3 (ref 0–0.4)
EOSINOPHIL NFR BLD AUTO: 3.9 % (ref 0.3–6.2)
ERYTHROCYTE [DISTWIDTH] IN BLOOD BY AUTOMATED COUNT: 13.7 % (ref 12.3–15.4)
GLOBULIN UR ELPH-MCNC: 2.9 GM/DL
GLUCOSE SERPL-MCNC: 98 MG/DL (ref 65–99)
GLUCOSE UR STRIP-MCNC: NEGATIVE MG/DL
HCT VFR BLD AUTO: 44 % (ref 34–46.6)
HGB BLD-MCNC: 14.2 G/DL (ref 12–15.9)
HGB UR QL STRIP.AUTO: NEGATIVE
IMM GRANULOCYTES # BLD AUTO: 0.05 10*3/MM3 (ref 0–0.05)
IMM GRANULOCYTES NFR BLD AUTO: 0.5 % (ref 0–0.5)
KETONES UR QL STRIP: NEGATIVE
LEUKOCYTE ESTERASE UR QL STRIP.AUTO: NEGATIVE
LYMPHOCYTES # BLD AUTO: 3.94 10*3/MM3 (ref 0.7–3.1)
LYMPHOCYTES NFR BLD AUTO: 39.7 % (ref 19.6–45.3)
MCH RBC QN AUTO: 29 PG (ref 26.6–33)
MCHC RBC AUTO-ENTMCNC: 32.3 G/DL (ref 31.5–35.7)
MCV RBC AUTO: 89.8 FL (ref 79–97)
MONOCYTES # BLD AUTO: 0.76 10*3/MM3 (ref 0.1–0.9)
MONOCYTES NFR BLD AUTO: 7.7 % (ref 5–12)
NEUTROPHILS NFR BLD AUTO: 4.7 10*3/MM3 (ref 1.7–7)
NEUTROPHILS NFR BLD AUTO: 47.3 % (ref 42.7–76)
NITRITE UR QL STRIP: NEGATIVE
NRBC BLD AUTO-RTO: 0 /100 WBC (ref 0–0.2)
PH UR STRIP.AUTO: 6.5 [PH] (ref 5–8)
PLATELET # BLD AUTO: 258 10*3/MM3 (ref 140–450)
PMV BLD AUTO: 9 FL (ref 6–12)
POTASSIUM SERPL-SCNC: 4.2 MMOL/L (ref 3.5–5.2)
PROT SERPL-MCNC: 6.9 G/DL (ref 6–8.5)
PROT UR QL STRIP: NEGATIVE
RBC # BLD AUTO: 4.9 10*6/MM3 (ref 3.77–5.28)
SODIUM SERPL-SCNC: 140 MMOL/L (ref 136–145)
SP GR UR STRIP: 1.01 (ref 1–1.03)
UROBILINOGEN UR QL STRIP: NORMAL
WBC NRBC COR # BLD AUTO: 9.93 10*3/MM3 (ref 3.4–10.8)

## 2024-01-11 PROCEDURE — 81003 URINALYSIS AUTO W/O SCOPE: CPT | Performed by: PODIATRIST

## 2024-01-11 PROCEDURE — 85025 COMPLETE CBC W/AUTO DIFF WBC: CPT

## 2024-01-11 PROCEDURE — 36415 COLL VENOUS BLD VENIPUNCTURE: CPT

## 2024-01-11 PROCEDURE — 80053 COMPREHEN METABOLIC PANEL: CPT

## 2024-05-30 ENCOUNTER — TRANSCRIBE ORDERS (OUTPATIENT)
Dept: ADMINISTRATIVE | Facility: HOSPITAL | Age: 74
End: 2024-05-30
Payer: MEDICARE

## 2024-05-30 DIAGNOSIS — Z12.31 ENCOUNTER FOR SCREENING MAMMOGRAM FOR MALIGNANT NEOPLASM OF BREAST: Primary | ICD-10-CM

## 2024-06-01 LAB
NCCN CRITERIA FLAG: ABNORMAL
TYRER CUZICK SCORE: 2.4

## 2024-06-03 NOTE — PROGRESS NOTES
This patient recently took the CARE risk assessment for a mammogram appointment. Based on the patient's responses, NCCN criteria for genetic testing was met.     Navigator follow-up:     I spoke with the patient regarding the risk assessment results.  She has declined genetic testing.

## 2024-06-11 ENCOUNTER — HOSPITAL ENCOUNTER (OUTPATIENT)
Dept: MAMMOGRAPHY | Facility: HOSPITAL | Age: 74
Discharge: HOME | End: 2024-06-11
Admitting: OBSTETRICS & GYNECOLOGY
Payer: MEDICARE

## 2024-06-11 DIAGNOSIS — Z12.31 ENCOUNTER FOR SCREENING MAMMOGRAM FOR MALIGNANT NEOPLASM OF BREAST: ICD-10-CM

## 2024-06-11 PROCEDURE — 77067 SCR MAMMO BI INCL CAD: CPT

## 2024-06-11 PROCEDURE — 77063 BREAST TOMOSYNTHESIS BI: CPT

## 2024-06-19 ENCOUNTER — HOSPITAL ENCOUNTER (OUTPATIENT)
Dept: ULTRASOUND IMAGING | Facility: HOSPITAL | Age: 74
Discharge: HOME | End: 2024-06-19
Payer: MEDICARE

## 2024-06-19 ENCOUNTER — HOSPITAL ENCOUNTER (OUTPATIENT)
Dept: MAMMOGRAPHY | Facility: HOSPITAL | Age: 74
Discharge: HOME | End: 2024-06-19
Payer: MEDICARE

## 2024-06-19 DIAGNOSIS — R92.8 ABNORMAL MAMMOGRAM: ICD-10-CM

## 2024-06-19 PROCEDURE — 76642 ULTRASOUND BREAST LIMITED: CPT

## 2024-06-19 PROCEDURE — 77065 DX MAMMO INCL CAD UNI: CPT

## 2024-06-19 PROCEDURE — G0279 TOMOSYNTHESIS, MAMMO: HCPCS

## 2024-07-08 ENCOUNTER — TRANSCRIBE ORDERS (OUTPATIENT)
Dept: ADMINISTRATIVE | Facility: HOSPITAL | Age: 74
End: 2024-07-08
Payer: MEDICARE

## 2024-07-08 DIAGNOSIS — N63.20 MASS OF LEFT BREAST, UNSPECIFIED QUADRANT: Primary | ICD-10-CM

## 2024-07-08 DIAGNOSIS — N63.21 MASS OF UPPER OUTER QUADRANT OF LEFT BREAST: ICD-10-CM

## 2024-07-08 DIAGNOSIS — N64.89 BREAST ASYMMETRY: ICD-10-CM

## 2024-07-08 DIAGNOSIS — N63.0 BREAST NODULE: ICD-10-CM

## 2024-07-15 ENCOUNTER — HOSPITAL ENCOUNTER (OUTPATIENT)
Dept: MAMMOGRAPHY | Facility: HOSPITAL | Age: 74
Discharge: HOME | End: 2024-07-15
Payer: MEDICARE

## 2024-07-15 ENCOUNTER — HOSPITAL ENCOUNTER (OUTPATIENT)
Dept: ULTRASOUND IMAGING | Facility: HOSPITAL | Age: 74
Discharge: HOME | End: 2024-07-15
Payer: MEDICARE

## 2024-07-15 DIAGNOSIS — N63.0 BREAST NODULE: ICD-10-CM

## 2024-07-15 DIAGNOSIS — N63.21 MASS OF UPPER OUTER QUADRANT OF LEFT BREAST: ICD-10-CM

## 2024-07-15 DIAGNOSIS — N63.20 MASS OF LEFT BREAST, UNSPECIFIED QUADRANT: ICD-10-CM

## 2024-07-15 DIAGNOSIS — N64.89 BREAST ASYMMETRY: ICD-10-CM

## 2024-07-15 PROCEDURE — 88342 IMHCHEM/IMCYTCHM 1ST ANTB: CPT | Performed by: OBSTETRICS & GYNECOLOGY

## 2024-07-15 PROCEDURE — A4648 IMPLANTABLE TISSUE MARKER: HCPCS

## 2024-07-15 PROCEDURE — 88305 TISSUE EXAM BY PATHOLOGIST: CPT | Performed by: OBSTETRICS & GYNECOLOGY

## 2024-07-15 RX ORDER — LIDOCAINE HYDROCHLORIDE 10 MG/ML
10 INJECTION, SOLUTION INFILTRATION; PERINEURAL ONCE
Status: ACTIVE | OUTPATIENT
Start: 2024-07-15

## 2024-07-15 RX ORDER — LIDOCAINE HYDROCHLORIDE AND EPINEPHRINE 10; 10 MG/ML; UG/ML
10 INJECTION, SOLUTION INFILTRATION; PERINEURAL ONCE
Status: ACTIVE | OUTPATIENT
Start: 2024-07-15

## 2024-07-19 LAB
CYTO UR: NORMAL
LAB AP CASE REPORT: NORMAL
LAB AP CLINICAL INFORMATION: NORMAL
LAB AP DIAGNOSIS COMMENT: NORMAL
Lab: NORMAL
PATH REPORT.FINAL DX SPEC: NORMAL
PATH REPORT.GROSS SPEC: NORMAL

## 2024-07-23 LAB
CYTO UR: NORMAL
LAB AP CASE REPORT: NORMAL
LAB AP CLINICAL INFORMATION: NORMAL
LAB AP DIAGNOSIS COMMENT: NORMAL
Lab: NORMAL
PATH REPORT.ADDENDUM SPEC: NORMAL
PATH REPORT.FINAL DX SPEC: NORMAL
PATH REPORT.GROSS SPEC: NORMAL

## 2024-07-31 PROCEDURE — 88305 TISSUE EXAM BY PATHOLOGIST: CPT | Performed by: SURGERY

## 2024-08-01 ENCOUNTER — LAB REQUISITION (OUTPATIENT)
Dept: LAB | Facility: HOSPITAL | Age: 74
End: 2024-08-01
Payer: MEDICARE

## 2024-08-01 DIAGNOSIS — K63.5 POLYP OF COLON: ICD-10-CM

## 2024-08-05 ENCOUNTER — OFFICE VISIT (OUTPATIENT)
Dept: SURGERY | Facility: CLINIC | Age: 74
End: 2024-08-05
Payer: MEDICARE

## 2024-08-05 VITALS
DIASTOLIC BLOOD PRESSURE: 86 MMHG | HEART RATE: 82 BPM | HEIGHT: 62 IN | BODY MASS INDEX: 31.28 KG/M2 | SYSTOLIC BLOOD PRESSURE: 164 MMHG | WEIGHT: 170 LBS | OXYGEN SATURATION: 98 %

## 2024-08-05 DIAGNOSIS — C50.919 INVASIVE LOBULAR CARCINOMA OF BREAST IN FEMALE: Primary | ICD-10-CM

## 2024-08-05 DIAGNOSIS — C50.412 MALIGNANT NEOPLASM OF UPPER-OUTER QUADRANT OF LEFT BREAST IN FEMALE, ESTROGEN RECEPTOR POSITIVE: ICD-10-CM

## 2024-08-05 DIAGNOSIS — Z17.0 MALIGNANT NEOPLASM OF UPPER-OUTER QUADRANT OF LEFT BREAST IN FEMALE, ESTROGEN RECEPTOR POSITIVE: ICD-10-CM

## 2024-08-05 DIAGNOSIS — E66.09 CLASS 1 OBESITY DUE TO EXCESS CALORIES WITH BODY MASS INDEX (BMI) OF 31.0 TO 31.9 IN ADULT, UNSPECIFIED WHETHER SERIOUS COMORBIDITY PRESENT: ICD-10-CM

## 2024-08-05 PROCEDURE — 1160F RVW MEDS BY RX/DR IN RCRD: CPT | Performed by: STUDENT IN AN ORGANIZED HEALTH CARE EDUCATION/TRAINING PROGRAM

## 2024-08-05 PROCEDURE — 99204 OFFICE O/P NEW MOD 45 MIN: CPT | Performed by: STUDENT IN AN ORGANIZED HEALTH CARE EDUCATION/TRAINING PROGRAM

## 2024-08-05 PROCEDURE — 1159F MED LIST DOCD IN RCRD: CPT | Performed by: STUDENT IN AN ORGANIZED HEALTH CARE EDUCATION/TRAINING PROGRAM

## 2024-08-05 RX ORDER — HEPARIN SODIUM 5000 [USP'U]/ML
5000 INJECTION, SOLUTION INTRAVENOUS; SUBCUTANEOUS EVERY 8 HOURS SCHEDULED
OUTPATIENT
Start: 2024-08-05

## 2024-08-05 RX ORDER — CARBIDOPA, LEVODOPA AND ENTACAPONE 31.25; 200; 125 MG/1; MG/1; MG/1
1 TABLET, FILM COATED ORAL 3 TIMES DAILY
COMMUNITY

## 2024-08-05 RX ORDER — METRONIDAZOLE 500 MG/1
500 TABLET ORAL 3 TIMES DAILY
COMMUNITY

## 2024-08-05 RX ORDER — CIPROFLOXACIN 500 MG/1
500 TABLET, FILM COATED ORAL 2 TIMES DAILY
COMMUNITY

## 2024-08-05 NOTE — PATIENT INSTRUCTIONS
"Breast MRI tomorrow - please arrive at 11:30.     BMI for Adults  What is BMI?  Body mass index (BMI) is a number that is calculated from a person's weight and height. BMI can help estimate how much of a person's weight is composed of fat. BMI does not measure body fat directly. Rather, it is an alternative to procedures that directly measure body fat, which can be difficult and expensive.  BMI can help identify people who may be at higher risk for certain medical problems.  What are BMI measurements used for?  BMI is used as a screening tool to identify possible weight problems. It helps determine whether a person is obese, overweight, a healthy weight, or underweight.  BMI is useful for:  Identifying a weight problem that may be related to a medical condition or may increase the risk for medical problems.  Promoting changes, such as changes in diet and exercise, to help reach a healthy weight. BMI screening can be repeated to see if these changes are working.  How is BMI calculated?  BMI involves measuring your weight in relation to your height. Both height and weight are measured, and the BMI is calculated from those numbers. This can be done either in English (U[place] or metric measurements. Note that charts and online BMI calculators are available to help you find your BMI quickly and easily without having to do these calculations yourself.  To calculate your BMI in English ([place].S[place] measurements:    Measure your weight in pounds (lb).  Multiply the number of pounds by 703.  For example, for a person who weighs 180 lb, multiply that number by 703, which equals [phone].  Measure your height in inches. Then multiply that number by itself to get a measurement called \"inches squared.\"  For example, for a person who is 70 inches tall, the \"inches squared\" measurement is 70 inches x 70 inches, which equals 4,900 inches squared.  Divide the total from step 2 (number of lb x 703) by the total from step 3 (inches squared): " "126,540 ÷ 4,900 = 25.8. This is your BMI.    To calculate your BMI in metric measurements:  Measure your weight in kilograms (kg).  Measure your height in meters (m). Then multiply that number by itself to get a measurement called \"meters squared.\"  For example, for a person who is 1.75 m tall, the \"meters squared\" measurement is 1.75 m x 1.75 m, which is equal to 3.1 meters squared.  Divide the number of kilograms (your weight) by the meters squared number. In this example: 70 ÷ 3.1 = 22.6. This is your BMI.  What do the results mean?  BMI charts are used to identify whether you are underweight, normal weight, overweight, or obese. The following guidelines will be used:  Underweight: BMI less than 18.5.  Normal weight: BMI between 18.5 and 24.9.  Overweight: BMI between 25 and 29.9.  Obese: BMI of 30 or above.  Keep these notes in mind:  Weight includes both fat and muscle, so someone with a muscular build, such as an athlete, may have a BMI that is higher than 24.9. In cases like these, BMI is not an accurate measure of body fat.  To determine if excess body fat is the cause of a BMI of 25 or higher, further assessments may need to be done by a health care provider.  BMI is usually interpreted in the same way for men and women.  Where to find more information  For more information about BMI, including tools to quickly calculate your BMI, go to these websites:  Centers for Disease Control and Prevention: www.cdc.gov  American Heart Association: www.heart.org  National Heart, Lung, and Blood [place]: www.nhlbi.nih.gov  Summary  Body mass index (BMI) is a number that is calculated from a person's weight and height.  BMI may help estimate how much of a person's weight is composed of fat. BMI can help identify those who may be at higher risk for certain medical problems.  BMI can be measured using English measurements or metric measurements.  BMI charts are used to identify whether you are underweight, normal weight, " overweight, or obese.  This information is not intended to replace advice given to you by your health care provider. Make sure you discuss any questions you have with your health care provider.  Document Revised: 09/09/2020 Document Reviewed: 07/17/2020  Elsevier Patient Education © 2021 [employer] Inc[employer]

## 2024-08-05 NOTE — H&P (VIEW-ONLY)
Office New Patient History and Physical:     Referring Provider: No ref. provider found    Chief Complaint   Patient presents with    invasive lobular carcinoma grade 2        Subjective .     History of present illness:  Maritza Mobley is a 74 y.o. female who presents with biopsy proven breast cancer.     Breast History information:   Prior abnormal mammograms: not before now   Prior breast biopsies: not before now   Palpable breast masses: none   Nipple discharge: none   Age at first menses: 14  Age at menopause: bilateral oophorectomy at 26 and 27   Number of biological children: 2  Age at first birth: 25  Years on birth control: 2-3 years   Years on HRT: 26 years old until last week (50 years)   Family history of breast cancer: none   Smoking History: none   BMI: 31    No blood thinners.      Review of Systems    Review of Systems - General ROS: negative  ENT ROS: negative  Respiratory ROS: no cough, shortness of breath, or wheezing  Cardiovascular ROS: no chest pain or dyspnea on exertion  Gastrointestinal ROS: no abdominal pain, change in bowel habits, or black or bloody stools  Genito-Urinary ROS: no dysuria, trouble voiding, or hematuria  Dermatological ROS: negative   Breast ROS: positive for - biopsy proven breast cancer   Hematological and Lymphatic ROS: negative  Musculoskeletal ROS: negative   Neurological ROS: no TIA or stroke symptoms    Psychological ROS: negative  Endocrine ROS: negative    History  Past Medical History:   Diagnosis Date    Arthritis     Disease of thyroid gland     Dysautonomia     GERD (gastroesophageal reflux disease)     Hashimoto thyroiditis, fibrous variant 2018    Hyperlipidemia     Hypertension     Kidney disease     Osteopenia     PONV (postoperative nausea and vomiting)    ,   Past Surgical History:   Procedure Laterality Date    ARM LESION/CYST EXCISION Left 1/11/2023    Procedure: LEFT RING FINGER MASS EXCISION;  Surgeon: Klever Gonzalez MD;  Location: [facility] OR;   Service: Orthopedics;  Laterality: Left;    CARDIAC CATHETERIZATION      x2    CERVICAL SPINE SURGERY       SECTION      x2    DILATATION AND CURETTAGE      HYSTERECTOMY     ,   Family History   Problem Relation Age of Onset    Breast cancer Neg Hx    ,   Social History     Tobacco Use    Smoking status: Never     Passive exposure: Never    Smokeless tobacco: Never   Vaping Use    Vaping status: Never Used   Substance Use Topics    Alcohol use: Not Currently    Drug use: Never   , (Not in a hospital admission)   and Allergies:  Levaquin [levofloxacin], Demerol [meperidine], Metoprolol, Percocet [oxycodone-acetaminophen], and Morphine    Current Outpatient Medications:     carbidopa-levodopa-entacapone (STALEVO) 31.-200 MG per tablet, Take 1 tablet by mouth 3 (Three) Times a Day., Disp: , Rfl:     ciprofloxacin (CIPRO) 500 MG tablet, Take 1 tablet by mouth 2 (Two) Times a Day., Disp: , Rfl:     dilTIAZem CD (CARDIZEM CD) 120 MG 24 hr capsule, Take 1 capsule by mouth Daily., Disp: , Rfl:     ezetimibe (ZETIA) 10 MG tablet, Take 1 tablet by mouth Daily., Disp: , Rfl:     levothyroxine (SYNTHROID, LEVOTHROID) 50 MCG tablet, Take 1 tablet by mouth Daily., Disp: , Rfl:     metroNIDAZOLE (FLAGYL) 500 MG tablet, Take 1 tablet by mouth 3 (Three) Times a Day., Disp: , Rfl:     NALTREXONE HCL PO, Take 4.5 mg by mouth Daily. LOW DOSE FOR AUTOIMMUNE DISEASE, Disp: , Rfl:     omeprazole (priLOSEC) 40 MG capsule, Take 1 capsule by mouth Daily., Disp: , Rfl:     Pregnenolone powder, , Disp: , Rfl:     Thyroid 60 MG PO tablet, Take 1 tablet by mouth Daily., Disp: , Rfl:     Zinc Gluconate 30 MG tablet, Take 1 tablet by mouth Daily., Disp: , Rfl:     Cranberry 200 MG capsule, Take 1 capsule by mouth Daily. (Patient not taking: Reported on 2024), Disp: , Rfl:     DULoxetine (CYMBALTA) 20 MG capsule, Take 20 mg by mouth Daily. (Patient not taking: Reported on 2024), Disp: , Rfl:     nitroglycerin (NITROSTAT) 0.4  "MG SL tablet, Place 0.4 mg under the tongue Every 5 (Five) Minutes As Needed for Chest Pain. Take no more than 3 doses in 15 minutes. (Patient not taking: Reported on 8/5/2024), Disp: , Rfl:     Current Facility-Administered Medications:     lidocaine (XYLOCAINE) 1 % injection 10 mL, 10 mL, Subcutaneous, Once, Martha Cruz MD    lidocaine 1% - EPINEPHrine 1:[id number] (XYLOCAINE W/EPI) 1 %-1:734737 injection 10 mL, 10 mL, Injection, Once, Martha Cruz MD    Objective     Vital Signs   /86   Pulse 82   Ht 157.5 cm (62\")   Wt 77.1 kg (170 lb)   SpO2 98%   BMI 31.09 kg/m²      Physical Exam:  General appearance - alert, well appearing, and in no distress  Mental status - alert, oriented to person, place, and time  Eyes - pupils equal and reactive, extraocular eye movements intact  Neck - supple, no significant adenopathy  Chest - no tachypnea, retractions or cyanosis  Heart - normal rate and regular rhythm  Abdomen - soft, nontender, nondistended, no masses or organomegaly  Neurological - alert, oriented, normal speech, no focal findings or movement disorder noted  Breast Exam: Bilateral breasts without obvious deformities. Bilateral nipples everted. Patient examined in the supine position with the ipsilateral arm above the head. Left breast biopsy site well healed.  No palpable masses bilaterally. No nipple discharge bilaterally. No palpable axillary nor supraclavicular adenopathy bilaterally.     Results Review:    The following data was reviewed by: Grace Hayes MD on 08/05/2024:  Tissue Pathology Exam (07/15/2024 10:29)   Left breast mass at 1 o'clock position, core biopsies:  A.  Invasive lobular carcinoma, grade 2.  B.  Tumor is 4.2 mm in greatest linear length.     AJCC stage: pTX pNX  ER 93%+ ID 95%+ Her2 -  Mammo Screening Digital Tomosynthesis Bilateral With CAD (06/11/2024 14:16)   Mammo Diagnostic Digital Tomosynthesis Left With CAD (06/19/2024 13:40)   US Breast Left Limited " (06/19/2024 14:21)   US Guided Breast Biopsy With & Without Device initial (07/15/2024 10:41)   Mammo Post Device Placement Left (07/15/2024 10:48)   MRI Breast Bilateral Diagnostic W WO Contrast (08/06/2024 12:58)   1. LEFT breast biopsy marker. No focally suspicious enhancement in the  RIGHT or LEFT breast. Lack of detectable enhancement on MRI does not  exclude residual pathologic disease after core biopsy.  2. No adenopathy.    Assessment & Plan       Diagnoses and all orders for this visit:    1. Invasive lobular carcinoma of breast in female (Primary)  -     MRI Breast Bilateral Diagnostic W WO Contrast; Future  -     Case Request; Standing  -     CBC & Differential; Future  -     Comprehensive Metabolic Panel; Future  -     ECG 12 Lead; Future  -     ceFAZolin (ANCEF) 2,000 mg in sodium chloride 0.9 % 100 mL IVPB  -     US Device Placement Breast Without Biopsy 1st; Future  -     heparin (porcine) 5000 UNIT/ML injection 5,000 Units  -     Case Request    2. Malignant neoplasm of upper-outer quadrant of left breast in female, estrogen receptor positive  -     MRI Breast Bilateral Diagnostic W WO Contrast; Future    3. Class 1 obesity due to excess calories with body mass index (BMI) of 31.0 to 31.9 in adult, unspecified whether serious comorbidity present    Other orders  -     Follow Anesthesia Guidelines / Protocol; Future  -     Follow Anesthesia Guidelines / Protocol; Standing  -     Provide Instructions to Patient on NPO Status; Future  -     Obtain Informed Consent; Standing  -     Do Not Place IV or Blood Pressure Cuff on Affected Side of Patient With History of Breast Cancer; Standing  -     Contact Surgeon With Questions or Concerns; Standing  -     Verify / Perform Chlorhexidine Skin Prep; Standing  -     Verify NPO Status; Standing  -     XR Chest 1 View; Standing  -     Place Sequential Compression Device; Standing  -     Maintain Sequential Compression Device; Standing  -     Obtain Informed  Consent; Future         Maritza Mobley is a 74 y.o. female with a 3 mm invasive lobular carcinoma of the left breast at 1:00, 4 cm FN, ER+AZ+HER2-. I personally reviewed her imaging including ultrasounds and mammograms, as well as the pathology report. I had a long discussion with the patient on the pathology of invasive lobular carcinoma. We need to proceed with MRI due to the lobular pathology. As long as there are no other concerning lesions on the MRI, will proceed with upfront surgery. I offered her a lumpectomy with sentinel lymph node biopsy (she can likely omit radiation due to age > 70, T1 ER+ lesion) vs. a mastectomy with sentinel lymph node biopsy - with or without reconstruction vs. Bilateral mastectomy with sentinel lymph node biopsy with or without reconstruction. I did  her that if she proceeds with lumpectomy, we will need negative margins. If we have positive margins, she would require re-resection. I counseled her that there is no difference in survival between the different surgical options. I discussed the risks of surgery, including but not limited to bleeding, infection, damage to surrounding structures, and cardiopulmonary complications as well as the benefits with her. She understands that a lumpectomy is an outpatient procedure and that a mastectomy would require drains and an overnight stay in the hospital for drain teaching and pain control. She has elected to proceed with a left lumpectomy with sentinel lymph node biopsy. She was offered a referral to plastic surgery to discuss reconstruction and is not interested at this time.  She is currently scheduled for the above operation on 8/27/24. She would like to see Dr. Osorio post op for anti-hormone therapy.     This is a life threatening diagnosis. I have reviewed all imaging and pathology above. I have ordered the above prework. She is at increased risk of perioperative complications 2/2 her elevated BMI of 31.     BMI is >=  30 and <35. (Class 1 Obesity). The following options were offered after discussion;: weight loss educational material (shared in after visit summary)      Grace Hayes MD  08/08/24  21:27 CDT

## 2024-08-05 NOTE — PROGRESS NOTES
Office New Patient History and Physical:     Referring Provider: No ref. provider found    Chief Complaint   Patient presents with    invasive lobular carcinoma grade 2        Subjective .     History of present illness:  Maritza Mobley is a 74 y.o. female who presents with biopsy proven breast cancer.     Breast History information:   Prior abnormal mammograms: not before now   Prior breast biopsies: not before now   Palpable breast masses: none   Nipple discharge: none   Age at first menses: 14  Age at menopause: bilateral oophorectomy at 26 and 27   Number of biological children: 2  Age at first birth: 25  Years on birth control: 2-3 years   Years on HRT: 26 years old until last week (50 years)   Family history of breast cancer: none   Smoking History: none   BMI: 31    No blood thinners.      Review of Systems    Review of Systems - General ROS: negative  ENT ROS: negative  Respiratory ROS: no cough, shortness of breath, or wheezing  Cardiovascular ROS: no chest pain or dyspnea on exertion  Gastrointestinal ROS: no abdominal pain, change in bowel habits, or black or bloody stools  Genito-Urinary ROS: no dysuria, trouble voiding, or hematuria  Dermatological ROS: negative   Breast ROS: positive for - biopsy proven breast cancer   Hematological and Lymphatic ROS: negative  Musculoskeletal ROS: negative   Neurological ROS: no TIA or stroke symptoms    Psychological ROS: negative  Endocrine ROS: negative    History  Past Medical History:   Diagnosis Date    Arthritis     Disease of thyroid gland     Dysautonomia     GERD (gastroesophageal reflux disease)     Hashimoto thyroiditis, fibrous variant 2018    Hyperlipidemia     Hypertension     Kidney disease     Osteopenia     PONV (postoperative nausea and vomiting)    ,   Past Surgical History:   Procedure Laterality Date    ARM LESION/CYST EXCISION Left 1/11/2023    Procedure: LEFT RING FINGER MASS EXCISION;  Surgeon: Klever Gonzalez MD;  Location: [facility] OR;   Service: Orthopedics;  Laterality: Left;    CARDIAC CATHETERIZATION      x2    CERVICAL SPINE SURGERY       SECTION      x2    DILATATION AND CURETTAGE      HYSTERECTOMY     ,   Family History   Problem Relation Age of Onset    Breast cancer Neg Hx    ,   Social History     Tobacco Use    Smoking status: Never     Passive exposure: Never    Smokeless tobacco: Never   Vaping Use    Vaping status: Never Used   Substance Use Topics    Alcohol use: Not Currently    Drug use: Never   , (Not in a hospital admission)   and Allergies:  Levaquin [levofloxacin], Demerol [meperidine], Metoprolol, Percocet [oxycodone-acetaminophen], and Morphine    Current Outpatient Medications:     carbidopa-levodopa-entacapone (STALEVO) 31.-200 MG per tablet, Take 1 tablet by mouth 3 (Three) Times a Day., Disp: , Rfl:     ciprofloxacin (CIPRO) 500 MG tablet, Take 1 tablet by mouth 2 (Two) Times a Day., Disp: , Rfl:     dilTIAZem CD (CARDIZEM CD) 120 MG 24 hr capsule, Take 1 capsule by mouth Daily., Disp: , Rfl:     ezetimibe (ZETIA) 10 MG tablet, Take 1 tablet by mouth Daily., Disp: , Rfl:     levothyroxine (SYNTHROID, LEVOTHROID) 50 MCG tablet, Take 1 tablet by mouth Daily., Disp: , Rfl:     metroNIDAZOLE (FLAGYL) 500 MG tablet, Take 1 tablet by mouth 3 (Three) Times a Day., Disp: , Rfl:     NALTREXONE HCL PO, Take 4.5 mg by mouth Daily. LOW DOSE FOR AUTOIMMUNE DISEASE, Disp: , Rfl:     omeprazole (priLOSEC) 40 MG capsule, Take 1 capsule by mouth Daily., Disp: , Rfl:     Pregnenolone powder, , Disp: , Rfl:     Thyroid 60 MG PO tablet, Take 1 tablet by mouth Daily., Disp: , Rfl:     Zinc Gluconate 30 MG tablet, Take 1 tablet by mouth Daily., Disp: , Rfl:     Cranberry 200 MG capsule, Take 1 capsule by mouth Daily. (Patient not taking: Reported on 2024), Disp: , Rfl:     DULoxetine (CYMBALTA) 20 MG capsule, Take 20 mg by mouth Daily. (Patient not taking: Reported on 2024), Disp: , Rfl:     nitroglycerin (NITROSTAT) 0.4  "MG SL tablet, Place 0.4 mg under the tongue Every 5 (Five) Minutes As Needed for Chest Pain. Take no more than 3 doses in 15 minutes. (Patient not taking: Reported on 8/5/2024), Disp: , Rfl:     Current Facility-Administered Medications:     lidocaine (XYLOCAINE) 1 % injection 10 mL, 10 mL, Subcutaneous, Once, Martha Cruz MD    lidocaine 1% - EPINEPHrine 1:[id number] (XYLOCAINE W/EPI) 1 %-1:143795 injection 10 mL, 10 mL, Injection, Once, Martha Cruz MD    Objective     Vital Signs   /86   Pulse 82   Ht 157.5 cm (62\")   Wt 77.1 kg (170 lb)   SpO2 98%   BMI 31.09 kg/m²      Physical Exam:  General appearance - alert, well appearing, and in no distress  Mental status - alert, oriented to person, place, and time  Eyes - pupils equal and reactive, extraocular eye movements intact  Neck - supple, no significant adenopathy  Chest - no tachypnea, retractions or cyanosis  Heart - normal rate and regular rhythm  Abdomen - soft, nontender, nondistended, no masses or organomegaly  Neurological - alert, oriented, normal speech, no focal findings or movement disorder noted  Breast Exam: Bilateral breasts without obvious deformities. Bilateral nipples everted. Patient examined in the supine position with the ipsilateral arm above the head. Left breast biopsy site well healed.  No palpable masses bilaterally. No nipple discharge bilaterally. No palpable axillary nor supraclavicular adenopathy bilaterally.     Results Review:    The following data was reviewed by: Grace Hayes MD on 08/05/2024:  Tissue Pathology Exam (07/15/2024 10:29)   Left breast mass at 1 o'clock position, core biopsies:  A.  Invasive lobular carcinoma, grade 2.  B.  Tumor is 4.2 mm in greatest linear length.     AJCC stage: pTX pNX  ER 93%+ AK 95%+ Her2 -  Mammo Screening Digital Tomosynthesis Bilateral With CAD (06/11/2024 14:16)   Mammo Diagnostic Digital Tomosynthesis Left With CAD (06/19/2024 13:40)   US Breast Left Limited " (06/19/2024 14:21)   US Guided Breast Biopsy With & Without Device initial (07/15/2024 10:41)   Mammo Post Device Placement Left (07/15/2024 10:48)   MRI Breast Bilateral Diagnostic W WO Contrast (08/06/2024 12:58)   1. LEFT breast biopsy marker. No focally suspicious enhancement in the  RIGHT or LEFT breast. Lack of detectable enhancement on MRI does not  exclude residual pathologic disease after core biopsy.  2. No adenopathy.    Assessment & Plan       Diagnoses and all orders for this visit:    1. Invasive lobular carcinoma of breast in female (Primary)  -     MRI Breast Bilateral Diagnostic W WO Contrast; Future  -     Case Request; Standing  -     CBC & Differential; Future  -     Comprehensive Metabolic Panel; Future  -     ECG 12 Lead; Future  -     ceFAZolin (ANCEF) 2,000 mg in sodium chloride 0.9 % 100 mL IVPB  -     US Device Placement Breast Without Biopsy 1st; Future  -     heparin (porcine) 5000 UNIT/ML injection 5,000 Units  -     Case Request    2. Malignant neoplasm of upper-outer quadrant of left breast in female, estrogen receptor positive  -     MRI Breast Bilateral Diagnostic W WO Contrast; Future    3. Class 1 obesity due to excess calories with body mass index (BMI) of 31.0 to 31.9 in adult, unspecified whether serious comorbidity present    Other orders  -     Follow Anesthesia Guidelines / Protocol; Future  -     Follow Anesthesia Guidelines / Protocol; Standing  -     Provide Instructions to Patient on NPO Status; Future  -     Obtain Informed Consent; Standing  -     Do Not Place IV or Blood Pressure Cuff on Affected Side of Patient With History of Breast Cancer; Standing  -     Contact Surgeon With Questions or Concerns; Standing  -     Verify / Perform Chlorhexidine Skin Prep; Standing  -     Verify NPO Status; Standing  -     XR Chest 1 View; Standing  -     Place Sequential Compression Device; Standing  -     Maintain Sequential Compression Device; Standing  -     Obtain Informed  Consent; Future         Maritza Mobley is a 74 y.o. female with a 3 mm invasive lobular carcinoma of the left breast at 1:00, 4 cm FN, ER+NM+HER2-. I personally reviewed her imaging including ultrasounds and mammograms, as well as the pathology report. I had a long discussion with the patient on the pathology of invasive lobular carcinoma. We need to proceed with MRI due to the lobular pathology. As long as there are no other concerning lesions on the MRI, will proceed with upfront surgery. I offered her a lumpectomy with sentinel lymph node biopsy (she can likely omit radiation due to age > 70, T1 ER+ lesion) vs. a mastectomy with sentinel lymph node biopsy - with or without reconstruction vs. Bilateral mastectomy with sentinel lymph node biopsy with or without reconstruction. I did  her that if she proceeds with lumpectomy, we will need negative margins. If we have positive margins, she would require re-resection. I counseled her that there is no difference in survival between the different surgical options. I discussed the risks of surgery, including but not limited to bleeding, infection, damage to surrounding structures, and cardiopulmonary complications as well as the benefits with her. She understands that a lumpectomy is an outpatient procedure and that a mastectomy would require drains and an overnight stay in the hospital for drain teaching and pain control. She has elected to proceed with a left lumpectomy with sentinel lymph node biopsy. She was offered a referral to plastic surgery to discuss reconstruction and is not interested at this time.  She is currently scheduled for the above operation on 8/27/24. She would like to see Dr. Osorio post op for anti-hormone therapy.     This is a life threatening diagnosis. I have reviewed all imaging and pathology above. I have ordered the above prework. She is at increased risk of perioperative complications 2/2 her elevated BMI of 31.     BMI is >=  30 and <35. (Class 1 Obesity). The following options were offered after discussion;: weight loss educational material (shared in after visit summary)      Grace Hayes MD  08/08/24  21:27 CDT

## 2024-08-06 ENCOUNTER — HOSPITAL ENCOUNTER (OUTPATIENT)
Dept: MRI IMAGING | Facility: HOSPITAL | Age: 74
Discharge: HOME | End: 2024-08-06
Admitting: STUDENT IN AN ORGANIZED HEALTH CARE EDUCATION/TRAINING PROGRAM
Payer: MEDICARE

## 2024-08-06 ENCOUNTER — TELEPHONE (OUTPATIENT)
Dept: SURGERY | Facility: CLINIC | Age: 74
End: 2024-08-06
Payer: MEDICARE

## 2024-08-06 DIAGNOSIS — C50.919 INVASIVE LOBULAR CARCINOMA OF BREAST IN FEMALE: ICD-10-CM

## 2024-08-06 DIAGNOSIS — C50.412 MALIGNANT NEOPLASM OF UPPER-OUTER QUADRANT OF LEFT BREAST IN FEMALE, ESTROGEN RECEPTOR POSITIVE: ICD-10-CM

## 2024-08-06 DIAGNOSIS — Z17.0 MALIGNANT NEOPLASM OF UPPER-OUTER QUADRANT OF LEFT BREAST IN FEMALE, ESTROGEN RECEPTOR POSITIVE: ICD-10-CM

## 2024-08-06 LAB — CREAT BLDA-MCNC: 1.1 MG/DL (ref 0.6–1.3)

## 2024-08-06 PROCEDURE — A9577 INJ MULTIHANCE: HCPCS | Performed by: STUDENT IN AN ORGANIZED HEALTH CARE EDUCATION/TRAINING PROGRAM

## 2024-08-06 PROCEDURE — C8908 MRI W/O FOL W/CONT, BREAST,: HCPCS

## 2024-08-06 PROCEDURE — C8937 CAD BREAST MRI: HCPCS

## 2024-08-06 PROCEDURE — 82565 ASSAY OF CREATININE: CPT

## 2024-08-06 PROCEDURE — 0 GADOBENATE DIMEGLUMINE 529 MG/ML SOLUTION: Performed by: STUDENT IN AN ORGANIZED HEALTH CARE EDUCATION/TRAINING PROGRAM

## 2024-08-06 RX ADMIN — GADOBENATE DIMEGLUMINE 16 ML: 529 INJECTION, SOLUTION INTRAVENOUS at 12:58

## 2024-08-06 NOTE — TELEPHONE ENCOUNTER
I personally called her with MRI results - no other concerning lesions. Proceed with scheduled surgery.     Grace Hayes MD  08/06/24

## 2024-08-21 ENCOUNTER — HOSPITAL ENCOUNTER (OUTPATIENT)
Dept: GENERAL RADIOLOGY | Facility: HOSPITAL | Age: 74
Discharge: HOME | End: 2024-08-21
Payer: MEDICARE

## 2024-08-21 ENCOUNTER — HOSPITAL ENCOUNTER (OUTPATIENT)
Dept: MAMMOGRAPHY | Facility: HOSPITAL | Age: 74
Discharge: HOME | End: 2024-08-21
Payer: MEDICARE

## 2024-08-21 ENCOUNTER — PRE-ADMISSION TESTING (OUTPATIENT)
Dept: PREADMISSION TESTING | Facility: HOSPITAL | Age: 74
End: 2024-08-21
Payer: MEDICARE

## 2024-08-21 ENCOUNTER — HOSPITAL ENCOUNTER (OUTPATIENT)
Dept: ULTRASOUND IMAGING | Facility: HOSPITAL | Age: 74
Discharge: HOME | End: 2024-08-21
Payer: MEDICARE

## 2024-08-21 VITALS
HEART RATE: 76 BPM | DIASTOLIC BLOOD PRESSURE: 80 MMHG | RESPIRATION RATE: 18 BRPM | BODY MASS INDEX: 31.04 KG/M2 | OXYGEN SATURATION: 97 % | SYSTOLIC BLOOD PRESSURE: 142 MMHG | WEIGHT: 168.65 LBS | HEIGHT: 62 IN

## 2024-08-21 DIAGNOSIS — C50.919 INVASIVE LOBULAR CARCINOMA OF BREAST IN FEMALE: ICD-10-CM

## 2024-08-21 LAB
ALBUMIN SERPL-MCNC: 4.2 G/DL (ref 3.5–5.2)
ALBUMIN/GLOB SERPL: 1.5 G/DL
ALP SERPL-CCNC: 73 U/L (ref 39–117)
ALT SERPL W P-5'-P-CCNC: 18 U/L (ref 1–33)
ANION GAP SERPL CALCULATED.3IONS-SCNC: 13 MMOL/L (ref 5–15)
AST SERPL-CCNC: 21 U/L (ref 1–32)
BASOPHILS # BLD AUTO: 0.09 10*3/MM3 (ref 0–0.2)
BASOPHILS NFR BLD AUTO: 0.8 % (ref 0–1.5)
BILIRUB SERPL-MCNC: 0.2 MG/DL (ref 0–1.2)
BUN SERPL-MCNC: 14 MG/DL (ref 8–23)
BUN/CREAT SERPL: 17.5 (ref 7–25)
CALCIUM SPEC-SCNC: 9.3 MG/DL (ref 8.6–10.5)
CHLORIDE SERPL-SCNC: 103 MMOL/L (ref 98–107)
CO2 SERPL-SCNC: 24 MMOL/L (ref 22–29)
CREAT SERPL-MCNC: 0.8 MG/DL (ref 0.57–1)
DEPRECATED RDW RBC AUTO: 43.6 FL (ref 37–54)
EGFRCR SERPLBLD CKD-EPI 2021: 78.4 ML/MIN/1.73
EOSINOPHIL # BLD AUTO: 0.32 10*3/MM3 (ref 0–0.4)
EOSINOPHIL NFR BLD AUTO: 3 % (ref 0.3–6.2)
ERYTHROCYTE [DISTWIDTH] IN BLOOD BY AUTOMATED COUNT: 13.4 % (ref 12.3–15.4)
GLOBULIN UR ELPH-MCNC: 2.8 GM/DL
GLUCOSE SERPL-MCNC: 99 MG/DL (ref 65–99)
HCT VFR BLD AUTO: 43.3 % (ref 34–46.6)
HGB BLD-MCNC: 14.4 G/DL (ref 12–15.9)
IMM GRANULOCYTES # BLD AUTO: 0.06 10*3/MM3 (ref 0–0.05)
IMM GRANULOCYTES NFR BLD AUTO: 0.6 % (ref 0–0.5)
LYMPHOCYTES # BLD AUTO: 3.72 10*3/MM3 (ref 0.7–3.1)
LYMPHOCYTES NFR BLD AUTO: 34.6 % (ref 19.6–45.3)
MCH RBC QN AUTO: 29.4 PG (ref 26.6–33)
MCHC RBC AUTO-ENTMCNC: 33.3 G/DL (ref 31.5–35.7)
MCV RBC AUTO: 88.4 FL (ref 79–97)
MONOCYTES # BLD AUTO: 0.87 10*3/MM3 (ref 0.1–0.9)
MONOCYTES NFR BLD AUTO: 8.1 % (ref 5–12)
NEUTROPHILS NFR BLD AUTO: 5.68 10*3/MM3 (ref 1.7–7)
NEUTROPHILS NFR BLD AUTO: 52.9 % (ref 42.7–76)
NRBC BLD AUTO-RTO: 0 /100 WBC (ref 0–0.2)
PLATELET # BLD AUTO: 305 10*3/MM3 (ref 140–450)
PMV BLD AUTO: 9.1 FL (ref 6–12)
POTASSIUM SERPL-SCNC: 4 MMOL/L (ref 3.5–5.2)
PROT SERPL-MCNC: 7 G/DL (ref 6–8.5)
RBC # BLD AUTO: 4.9 10*6/MM3 (ref 3.77–5.28)
SODIUM SERPL-SCNC: 140 MMOL/L (ref 136–145)
WBC NRBC COR # BLD AUTO: 10.74 10*3/MM3 (ref 3.4–10.8)

## 2024-08-21 PROCEDURE — 80053 COMPREHEN METABOLIC PANEL: CPT

## 2024-08-21 PROCEDURE — 36415 COLL VENOUS BLD VENIPUNCTURE: CPT

## 2024-08-21 PROCEDURE — 93005 ELECTROCARDIOGRAM TRACING: CPT

## 2024-08-21 PROCEDURE — 71045 X-RAY EXAM CHEST 1 VIEW: CPT

## 2024-08-21 PROCEDURE — 85025 COMPLETE CBC W/AUTO DIFF WBC: CPT

## 2024-08-21 PROCEDURE — A4648 IMPLANTABLE TISSUE MARKER: HCPCS

## 2024-08-21 RX ORDER — VALACYCLOVIR HYDROCHLORIDE 500 MG/1
500 TABLET, FILM COATED ORAL AS NEEDED
COMMUNITY

## 2024-08-21 RX ORDER — NITROFURANTOIN MACROCRYSTALS 100 MG/1
100 CAPSULE ORAL AS NEEDED
COMMUNITY

## 2024-08-21 RX ORDER — DICYCLOMINE HYDROCHLORIDE 10 MG/1
10 CAPSULE ORAL DAILY
COMMUNITY

## 2024-08-21 RX ORDER — PHENOL 1.4 %
600 AEROSOL, SPRAY (ML) MUCOUS MEMBRANE DAILY
COMMUNITY

## 2024-08-21 RX ORDER — LIDOCAINE HYDROCHLORIDE 10 MG/ML
10 INJECTION, SOLUTION INFILTRATION; PERINEURAL ONCE
Status: DISPENSED | OUTPATIENT
Start: 2024-08-21

## 2024-08-21 NOTE — DISCHARGE INSTRUCTIONS
Preparing for Surgery  Follow these instructions before the procedure:  Several days or weeks before your procedure      Ask your health care provider about:  Changing or stopping your regular medicines. This is especially important if you are taking diabetes medicines or blood thinners.  Taking medicines such as aspirin and ibuprofen. These medicines can thin your blood. Do not take these medicines unless your health care provider tells you to take them.  Taking over-the-counter medicines, vitamins, herbs, and supplements.    Contact your surgeon if you:  Develop a fever of more than 100.4°F (38°C) or other feelings of illness during the 48 hours before your surgery.  Have symptoms that get worse.  Have questions or concerns about your surgery.  If you are going home the same day of your surgery you will need to arrange for a responsible adult, age 18 years old or older, to drive you home from the hospital and stay with you for 24 hours. Verification of the  will be made prior to any procedure requiring sedation. You may not go home in a taxi or any form of public transportation by yourself.     Day before your procedure    24 hours before your procedure DO NOT drink alcoholic beverages or smoke.  24 hours before your procedure STOP taking Erectile Dysfunction medication (i.e.,Cialis, Viagra)   You may be asked to shower with a germ-killing soap.  Day of your procedure   You may take the following medication(s) the morning of surgery with a sip of water:  PRILOSEC      8 hours before your scheduled arrival time, STOP all food, any dairy products, and full liquids. This includes hard candy, chewing gum or mints. This is extremely important to prevent serious complications.     Up to 2 hours before your scheduled arrival time, you may have clear liquids no cream, powder, or pulp of any kind. Safe options are water, black coffee, plain tea, soda, Gatorade/Powerade, clear broth, apple juice.    2 hours before  your scheduled arrival time, STOP drinking clear liquids.    You may need to take another shower with a germ-killing soap before you leave home in the morning. Do not use perfumes, colognes, or body lotions.  Wear comfortable loose-fitting clothing.  Remove all jewelry including body piercing and rings, dark colored nail polish, and make up prior to arrival at the hospital. Leave all valuables at home.   Bring your hearing aids if you rely on them.  Do not wear contact lenses. If you wear eyeglasses remember to bring a case to store them in while you are in surgery.  Do not use denture adhesives since you will be asked to remove them during your surgery.    You do not need to bring your home medications into the hospital.   Bring your sleep apnea device with you on the day of your surgery (if this applies to you).  If you wear portable oxygen, bring it with you.   If you are staying overnight, you may bring a bag of items you may need such as slippers, robe and a change of clothes for your discharge. You may want to leave these items in the car until you are ready for them since your family will take your belongings when you leave the pre-operative area.  Arrive at the hospital as scheduled by the office. You will be asked to arrive 2 hours prior to your surgery time in order to prepare for your procedure.  When you arrive at the hospital  Go to the registration desk located at the main entrance of the hospital.  After registration is completed, you will be given a beeper and a sticker sheet. Take the stickers to Outpatient Surgery and place in the tray at the end of the desk to notify the staff that you have arrived and registered.   Return to the lobby to wait. You are not always called back according to the time of arrival but rather the time your doctor will be ready.  When your beeper lights up and vibrates proceed through the double doors, under the stairs, and a member of the Outpatient Surgery staff will  escort you to your preoperative room.             How to Use Chlorhexidine Before Surgery  Chlorhexidine gluconate (CHG) is a germ-killing (antiseptic) solution that is used to clean the skin. It can get rid of the bacteria that normally live on the skin and can keep them away for about 24 hours. To clean your skin with CHG, you may be given:  A CHG solution to use in the shower or as part of a sponge bath.  A prepackaged cloth that contains CHG.  Cleaning your skin with CHG may help lower the risk for infection:  While you are staying in the intensive care unit of the hospital.  If you have a vascular access, such as a central line, to provide short-term or long-term access to your veins.  If you have a catheter to drain urine from your bladder.  If you are on a ventilator. A ventilator is a machine that helps you breathe by moving air in and out of your lungs.  After surgery.  What are the risks?  Risks of using CHG include:  A skin reaction.  Hearing loss, if CHG gets in your ears and you have a perforated eardrum.  Eye injury, if CHG gets in your eyes and is not rinsed out.  The CHG product catching fire.  Make sure that you avoid smoking and flames after applying CHG to your skin.  Do not use CHG:  If you have a chlorhexidine allergy or have previously reacted to chlorhexidine.  On babies younger than 2 months of age.  How to use CHG solution  Use CHG only as told by your health care provider, and follow the instructions on the label.  Use the full amount of CHG as directed. Usually, this is one bottle.  During a shower    Follow these steps when using CHG solution during a shower (unless your health care provider gives you different instructions):  Start the shower.  Use your normal soap and shampoo to wash your face and hair.  Turn off the shower or move out of the shower stream.  Pour the CHG onto a clean washcloth. Do not use any type of brush or rough-edged sponge.  Starting at your neck, lather your body  down to your toes. Make sure you follow these instructions:  If you will be having surgery, pay special attention to the part of your body where you will be having surgery. Scrub this area for at least 1 minute.  Do not use CHG on your head or face. If the solution gets into your ears or eyes, rinse them well with water.  Avoid your genital area.  Avoid any areas of skin that have broken skin, cuts, or scrapes.  Scrub your back and under your arms. Make sure to wash skin folds.  Let the lather sit on your skin for 1-2 minutes or as long as told by your health care provider.  Thoroughly rinse your entire body in the shower. Make sure that all body creases and crevices are rinsed well.  Dry off with a clean towel. Do not put any substances on your body afterward--such as powder, lotion, or perfume--unless you are told to do so by your health care provider. Only use lotions that are recommended by the .  Put on clean clothes or pajamas.  If it is the night before your surgery, sleep in clean sheets.     During a sponge bath  Follow these steps when using CHG solution during a sponge bath (unless your health care provider gives you different instructions):  Use your normal soap and shampoo to wash your face and hair.  Pour the CHG onto a clean washcloth.  Starting at your neck, lather your body down to your toes. Make sure you follow these instructions:  If you will be having surgery, pay special attention to the part of your body where you will be having surgery. Scrub this area for at least 1 minute.  Do not use CHG on your head or face. If the solution gets into your ears or eyes, rinse them well with water.  Avoid your genital area.  Avoid any areas of skin that have broken skin, cuts, or scrapes.  Scrub your back and under your arms. Make sure to wash skin folds.  Let the lather sit on your skin for 1-2 minutes or as long as told by your health care provider.  Using a different clean, wet washcloth,  thoroughly rinse your entire body. Make sure that all body creases and crevices are rinsed well.  Dry off with a clean towel. Do not put any substances on your body afterward--such as powder, lotion, or perfume--unless you are told to do so by your health care provider. Only use lotions that are recommended by the .  Put on clean clothes or pajamas.  If it is the night before your surgery, sleep in clean sheets.  How to use CHG prepackaged cloths  Only use CHG cloths as told by your health care provider, and follow the instructions on the label.  Use the CHG cloth on clean, dry skin.  Do not use the CHG cloth on your head or face unless your health care provider tells you to.  When washing with the CHG cloth:  Avoid your genital area.  Avoid any areas of skin that have broken skin, cuts, or scrapes.  Before surgery    Follow these steps when using a CHG cloth to clean before surgery (unless your health care provider gives you different instructions):  Using the CHG cloth, vigorously scrub the part of your body where you will be having surgery. Scrub using a back-and-forth motion for 3 minutes. The area on your body should be completely wet with CHG when you are done scrubbing.  Do not rinse. Discard the cloth and let the area air-dry. Do not put any substances on the area afterward, such as powder, lotion, or perfume.  Put on clean clothes or pajamas.  If it is the night before your surgery, sleep in clean sheets.     For general bathing  Follow these steps when using CHG cloths for general bathing (unless your health care provider gives you different instructions).  Use a separate CHG cloth for each area of your body. Make sure you wash between any folds of skin and between your fingers and toes. Wash your body in the following order, switching to a new cloth after each step:  The front of your neck, shoulders, and chest.  Both of your arms, under your arms, and your hands.  Your stomach and groin area,  avoiding the genitals.  Your right leg and foot.  Your left leg and foot.  The back of your neck, your back, and your buttocks.  Do not rinse. Discard the cloth and let the area air-dry. Do not put any substances on your body afterward--such as powder, lotion, or perfume--unless you are told to do so by your health care provider. Only use lotions that are recommended by the .  Put on clean clothes or pajamas.  Contact a health care provider if:  Your skin gets irritated after scrubbing.  You have questions about using your solution or cloth.  You swallow any chlorhexidine. Call your local poison control center (1-746.664.6052 in the [place]  Get help right away if:  Your eyes itch badly, or they become very red or swollen.  Your skin itches badly and is red or swollen.  Your hearing changes.  You have trouble seeing.  You have swelling or tingling in your mouth or throat.  You have trouble breathing.  These symptoms may represent a serious problem that is an emergency. Do not wait to see if the symptoms will go away. Get medical help right away. Call your local emergency services (844 in the [place] Do not drive yourself to the hospital.  Summary  Chlorhexidine gluconate (CHG) is a germ-killing (antiseptic) solution that is used to clean the skin. Cleaning your skin with CHG may help to lower your risk for infection.  You may be given CHG to use for bathing. It may be in a bottle or in a prepackaged cloth to use on your skin. Carefully follow your health care provider's instructions and the instructions on the product label.  Do not use CHG if you have a chlorhexidine allergy.  Contact your health care provider if your skin gets irritated after scrubbing.  This information is not intended to replace advice given to you by your health care provider. Make sure you discuss any questions you have with your health care provider.  Document Revised: 04/17/2023 Document Reviewed: 02/28/2022  [employer]vier Patient  Education © 2023 [employer]

## 2024-08-22 LAB
QT INTERVAL: 386 MS
QTC INTERVAL: 398 MS

## 2024-08-26 ENCOUNTER — TELEPHONE (OUTPATIENT)
Dept: SURGERY | Facility: CLINIC | Age: 74
End: 2024-08-26
Payer: MEDICARE

## 2024-08-26 NOTE — TELEPHONE ENCOUNTER
Called Maritza to confirm her surgery date 08/27/2024 with an arrival time of 10:00AM.   Reminded her not to eat or drink after midnight.   Let her know to come through the main entrance of the hospital and check in at main registration.     Left message with patient.

## 2024-08-27 ENCOUNTER — ANESTHESIA (OUTPATIENT)
Dept: PERIOP | Facility: HOSPITAL | Age: 74
End: 2024-08-27
Payer: MEDICARE

## 2024-08-27 ENCOUNTER — ANESTHESIA EVENT (OUTPATIENT)
Dept: PERIOP | Facility: HOSPITAL | Age: 74
End: 2024-08-27
Payer: MEDICARE

## 2024-08-27 ENCOUNTER — HOSPITAL ENCOUNTER (OUTPATIENT)
Dept: GENERAL RADIOLOGY | Facility: HOSPITAL | Age: 74
Discharge: HOME | End: 2024-08-27
Payer: MEDICARE

## 2024-08-27 ENCOUNTER — HOSPITAL ENCOUNTER (OUTPATIENT)
Facility: HOSPITAL | Age: 74
Setting detail: HOSPITAL OUTPATIENT SURGERY
Discharge: HOME | End: 2024-08-27
Attending: STUDENT IN AN ORGANIZED HEALTH CARE EDUCATION/TRAINING PROGRAM | Admitting: STUDENT IN AN ORGANIZED HEALTH CARE EDUCATION/TRAINING PROGRAM
Payer: MEDICARE

## 2024-08-27 VITALS
HEART RATE: 77 BPM | TEMPERATURE: 97.3 F | OXYGEN SATURATION: 96 % | DIASTOLIC BLOOD PRESSURE: 74 MMHG | RESPIRATION RATE: 16 BRPM | SYSTOLIC BLOOD PRESSURE: 136 MMHG

## 2024-08-27 DIAGNOSIS — C50.919 INVASIVE LOBULAR CARCINOMA OF BREAST IN FEMALE: ICD-10-CM

## 2024-08-27 PROCEDURE — 25010000002 CEFAZOLIN PER 500 MG: Performed by: STUDENT IN AN ORGANIZED HEALTH CARE EDUCATION/TRAINING PROGRAM

## 2024-08-27 PROCEDURE — 25010000002 FENTANYL CITRATE (PF) 100 MCG/2ML SOLUTION: Performed by: NURSE ANESTHETIST, CERTIFIED REGISTERED

## 2024-08-27 PROCEDURE — 25010000002 ONDANSETRON PER 1 MG: Performed by: NURSE ANESTHETIST, CERTIFIED REGISTERED

## 2024-08-27 PROCEDURE — 88307 TISSUE EXAM BY PATHOLOGIST: CPT | Performed by: STUDENT IN AN ORGANIZED HEALTH CARE EDUCATION/TRAINING PROGRAM

## 2024-08-27 PROCEDURE — 25810000003 LACTATED RINGERS PER 1000 ML: Performed by: ANESTHESIOLOGY

## 2024-08-27 PROCEDURE — 38525 BIOPSY/REMOVAL LYMPH NODES: CPT | Performed by: STUDENT IN AN ORGANIZED HEALTH CARE EDUCATION/TRAINING PROGRAM

## 2024-08-27 PROCEDURE — 19301 PARTIAL MASTECTOMY: CPT | Performed by: STUDENT IN AN ORGANIZED HEALTH CARE EDUCATION/TRAINING PROGRAM

## 2024-08-27 PROCEDURE — 88342 IMHCHEM/IMCYTCHM 1ST ANTB: CPT | Performed by: STUDENT IN AN ORGANIZED HEALTH CARE EDUCATION/TRAINING PROGRAM

## 2024-08-27 PROCEDURE — 25010000002 LIDOCAINE 1 % SOLUTION 20 ML VIAL: Performed by: STUDENT IN AN ORGANIZED HEALTH CARE EDUCATION/TRAINING PROGRAM

## 2024-08-27 PROCEDURE — 38900 IO MAP OF SENT LYMPH NODE: CPT | Performed by: STUDENT IN AN ORGANIZED HEALTH CARE EDUCATION/TRAINING PROGRAM

## 2024-08-27 PROCEDURE — 25010000002 HYDROMORPHONE 1 MG/ML SOLUTION: Performed by: NURSE ANESTHETIST, CERTIFIED REGISTERED

## 2024-08-27 PROCEDURE — 25010000002 DROPERIDOL PER 5 MG: Performed by: ANESTHESIOLOGY

## 2024-08-27 PROCEDURE — 25010000002 PROPOFOL 10 MG/ML EMULSION: Performed by: NURSE ANESTHETIST, CERTIFIED REGISTERED

## 2024-08-27 PROCEDURE — 25010000002 BUPIVACAINE (PF) 0.25 % SOLUTION 30 ML VIAL: Performed by: STUDENT IN AN ORGANIZED HEALTH CARE EDUCATION/TRAINING PROGRAM

## 2024-08-27 PROCEDURE — 25010000002 METHYLENE BLUE 50 MG/10ML SOLUTION: Performed by: STUDENT IN AN ORGANIZED HEALTH CARE EDUCATION/TRAINING PROGRAM

## 2024-08-27 PROCEDURE — 25010000002 HEPARIN (PORCINE) PER 1000 UNITS: Performed by: STUDENT IN AN ORGANIZED HEALTH CARE EDUCATION/TRAINING PROGRAM

## 2024-08-27 PROCEDURE — 19301 PARTIAL MASTECTOMY: CPT

## 2024-08-27 PROCEDURE — 25810000003 LACTATED RINGERS PER 1000 ML: Performed by: STUDENT IN AN ORGANIZED HEALTH CARE EDUCATION/TRAINING PROGRAM

## 2024-08-27 PROCEDURE — 76098 X-RAY EXAM SURGICAL SPECIMEN: CPT

## 2024-08-27 DEVICE — HEMOST ABS SURGICEL PWDR 3GM: Type: IMPLANTABLE DEVICE | Site: BREAST | Status: FUNCTIONAL

## 2024-08-27 DEVICE — LIGACLIP MCA MULTIPLE CLIP APPLIERS, 30 MEDIUM CLIPS
Type: IMPLANTABLE DEVICE | Site: BREAST | Status: FUNCTIONAL
Brand: LIGACLIP

## 2024-08-27 DEVICE — MARKR TISS/BRST MAGTRACE NO/RADITON INJ/LIQ 2ML: Type: IMPLANTABLE DEVICE | Site: BREAST | Status: FUNCTIONAL

## 2024-08-27 RX ORDER — ACETAMINOPHEN 500 MG
1000 TABLET ORAL ONCE
Status: COMPLETED | OUTPATIENT
Start: 2024-08-27 | End: 2024-08-27

## 2024-08-27 RX ORDER — TRAMADOL HYDROCHLORIDE 50 MG/1
50 TABLET ORAL EVERY 8 HOURS PRN
Qty: 5 TABLET | Refills: 0 | Status: SHIPPED | OUTPATIENT
Start: 2024-08-27 | End: 2024-08-30

## 2024-08-27 RX ORDER — GINSENG 100 MG
200 CAPSULE ORAL DAILY
COMMUNITY

## 2024-08-27 RX ORDER — LIDOCAINE HYDROCHLORIDE 10 MG/ML
0.5 INJECTION, SOLUTION EPIDURAL; INFILTRATION; INTRACAUDAL; PERINEURAL ONCE AS NEEDED
Status: DISCONTINUED | OUTPATIENT
Start: 2024-08-27 | End: 2024-08-27 | Stop reason: HOSPADM

## 2024-08-27 RX ORDER — SODIUM CHLORIDE 0.9 % (FLUSH) 0.9 %
3 SYRINGE (ML) INJECTION EVERY 12 HOURS SCHEDULED
Status: DISCONTINUED | OUTPATIENT
Start: 2024-08-27 | End: 2024-08-27 | Stop reason: HOSPADM

## 2024-08-27 RX ORDER — DROPERIDOL 2.5 MG/ML
0.62 INJECTION, SOLUTION INTRAMUSCULAR; INTRAVENOUS ONCE AS NEEDED
Status: COMPLETED | OUTPATIENT
Start: 2024-08-27 | End: 2024-08-27

## 2024-08-27 RX ORDER — MIDAZOLAM HYDROCHLORIDE 2 MG/2ML
0.5 INJECTION, SOLUTION INTRAMUSCULAR; INTRAVENOUS
Status: DISCONTINUED | OUTPATIENT
Start: 2024-08-27 | End: 2024-08-27 | Stop reason: HOSPADM

## 2024-08-27 RX ORDER — HEPARIN SODIUM 5000 [USP'U]/ML
5000 INJECTION, SOLUTION INTRAVENOUS; SUBCUTANEOUS EVERY 8 HOURS SCHEDULED
Status: DISCONTINUED | OUTPATIENT
Start: 2024-08-27 | End: 2024-08-27 | Stop reason: HOSPADM

## 2024-08-27 RX ORDER — ONDANSETRON 2 MG/ML
INJECTION INTRAMUSCULAR; INTRAVENOUS AS NEEDED
Status: DISCONTINUED | OUTPATIENT
Start: 2024-08-27 | End: 2024-08-27 | Stop reason: SURG

## 2024-08-27 RX ORDER — HYDROCODONE BITARTRATE AND ACETAMINOPHEN 5; 325 MG/1; MG/1
1 TABLET ORAL EVERY 4 HOURS PRN
Status: DISCONTINUED | OUTPATIENT
Start: 2024-08-27 | End: 2024-08-27 | Stop reason: HOSPADM

## 2024-08-27 RX ORDER — ONDANSETRON 4 MG/1
4 TABLET, FILM COATED ORAL EVERY 8 HOURS PRN
Qty: 15 TABLET | Refills: 0 | Status: SHIPPED | OUTPATIENT
Start: 2024-08-27 | End: 2025-08-27

## 2024-08-27 RX ORDER — LABETALOL HYDROCHLORIDE 5 MG/ML
5 INJECTION, SOLUTION INTRAVENOUS
Status: DISCONTINUED | OUTPATIENT
Start: 2024-08-27 | End: 2024-08-27 | Stop reason: HOSPADM

## 2024-08-27 RX ORDER — SODIUM CHLORIDE 0.9 % (FLUSH) 0.9 %
3 SYRINGE (ML) INJECTION AS NEEDED
Status: DISCONTINUED | OUTPATIENT
Start: 2024-08-27 | End: 2024-08-27 | Stop reason: HOSPADM

## 2024-08-27 RX ORDER — FENTANYL CITRATE 50 UG/ML
50 INJECTION, SOLUTION INTRAMUSCULAR; INTRAVENOUS
Status: DISCONTINUED | OUTPATIENT
Start: 2024-08-27 | End: 2024-08-27 | Stop reason: HOSPADM

## 2024-08-27 RX ORDER — SODIUM CHLORIDE, SODIUM LACTATE, POTASSIUM CHLORIDE, CALCIUM CHLORIDE 600; 310; 30; 20 MG/100ML; MG/100ML; MG/100ML; MG/100ML
100 INJECTION, SOLUTION INTRAVENOUS CONTINUOUS
Status: DISCONTINUED | OUTPATIENT
Start: 2024-08-27 | End: 2024-08-27 | Stop reason: HOSPADM

## 2024-08-27 RX ORDER — EPHEDRINE SULFATE 50 MG/ML
INJECTION INTRAVENOUS AS NEEDED
Status: DISCONTINUED | OUTPATIENT
Start: 2024-08-27 | End: 2024-08-27 | Stop reason: SURG

## 2024-08-27 RX ORDER — IBUPROFEN 600 MG/1
600 TABLET, FILM COATED ORAL EVERY 6 HOURS PRN
Status: DISCONTINUED | OUTPATIENT
Start: 2024-08-27 | End: 2024-08-27 | Stop reason: HOSPADM

## 2024-08-27 RX ORDER — SODIUM CHLORIDE 0.9 % (FLUSH) 0.9 %
3-10 SYRINGE (ML) INJECTION AS NEEDED
Status: DISCONTINUED | OUTPATIENT
Start: 2024-08-27 | End: 2024-08-27 | Stop reason: HOSPADM

## 2024-08-27 RX ORDER — SODIUM CHLORIDE 9 MG/ML
40 INJECTION, SOLUTION INTRAVENOUS AS NEEDED
Status: DISCONTINUED | OUTPATIENT
Start: 2024-08-27 | End: 2024-08-27 | Stop reason: HOSPADM

## 2024-08-27 RX ORDER — SODIUM CHLORIDE, SODIUM LACTATE, POTASSIUM CHLORIDE, CALCIUM CHLORIDE 600; 310; 30; 20 MG/100ML; MG/100ML; MG/100ML; MG/100ML
1000 INJECTION, SOLUTION INTRAVENOUS CONTINUOUS
Status: DISCONTINUED | OUTPATIENT
Start: 2024-08-27 | End: 2024-08-27 | Stop reason: HOSPADM

## 2024-08-27 RX ORDER — PROPOFOL 10 MG/ML
VIAL (ML) INTRAVENOUS AS NEEDED
Status: DISCONTINUED | OUTPATIENT
Start: 2024-08-27 | End: 2024-08-27 | Stop reason: SURG

## 2024-08-27 RX ORDER — HYDROCODONE BITARTRATE AND ACETAMINOPHEN 10; 325 MG/1; MG/1
1 TABLET ORAL EVERY 4 HOURS PRN
Status: DISCONTINUED | OUTPATIENT
Start: 2024-08-27 | End: 2024-08-27 | Stop reason: HOSPADM

## 2024-08-27 RX ORDER — FLUMAZENIL 0.1 MG/ML
0.2 INJECTION INTRAVENOUS AS NEEDED
Status: DISCONTINUED | OUTPATIENT
Start: 2024-08-27 | End: 2024-08-27 | Stop reason: HOSPADM

## 2024-08-27 RX ORDER — ACETAMINOPHEN 325 MG/1
975 TABLET ORAL EVERY 8 HOURS
Start: 2024-08-27 | End: 2025-08-27

## 2024-08-27 RX ORDER — FENTANYL CITRATE 50 UG/ML
INJECTION, SOLUTION INTRAMUSCULAR; INTRAVENOUS AS NEEDED
Status: DISCONTINUED | OUTPATIENT
Start: 2024-08-27 | End: 2024-08-27 | Stop reason: SURG

## 2024-08-27 RX ORDER — ONDANSETRON 2 MG/ML
4 INJECTION INTRAMUSCULAR; INTRAVENOUS ONCE AS NEEDED
Status: DISCONTINUED | OUTPATIENT
Start: 2024-08-27 | End: 2024-08-27 | Stop reason: HOSPADM

## 2024-08-27 RX ORDER — IBUPROFEN 200 MG
600 TABLET ORAL EVERY 8 HOURS
Start: 2024-08-27 | End: 2024-09-04

## 2024-08-27 RX ORDER — WATER 10 ML/10ML
INJECTION INTRAMUSCULAR; INTRAVENOUS; SUBCUTANEOUS AS NEEDED
Status: DISCONTINUED | OUTPATIENT
Start: 2024-08-27 | End: 2024-08-27 | Stop reason: HOSPADM

## 2024-08-27 RX ORDER — NALOXONE HCL 0.4 MG/ML
0.4 VIAL (ML) INJECTION AS NEEDED
Status: DISCONTINUED | OUTPATIENT
Start: 2024-08-27 | End: 2024-08-27 | Stop reason: HOSPADM

## 2024-08-27 RX ADMIN — HYDROCODONE BITARTRATE AND ACETAMINOPHEN 1 TABLET: 5; 325 TABLET ORAL at 14:25

## 2024-08-27 RX ADMIN — ONDANSETRON 4 MG: 2 INJECTION INTRAMUSCULAR; INTRAVENOUS at 13:35

## 2024-08-27 RX ADMIN — FENTANYL CITRATE 50 MCG: 50 INJECTION, SOLUTION INTRAMUSCULAR; INTRAVENOUS at 12:26

## 2024-08-27 RX ADMIN — ACETAMINOPHEN 1000 MG: 500 TABLET, FILM COATED ORAL at 10:58

## 2024-08-27 RX ADMIN — HYDROMORPHONE HYDROCHLORIDE 0.5 MG: 1 INJECTION, SOLUTION INTRAMUSCULAR; INTRAVENOUS; SUBCUTANEOUS at 12:33

## 2024-08-27 RX ADMIN — SODIUM CHLORIDE, POTASSIUM CHLORIDE, SODIUM LACTATE AND CALCIUM CHLORIDE 1000 ML: 600; 310; 30; 20 INJECTION, SOLUTION INTRAVENOUS at 14:07

## 2024-08-27 RX ADMIN — EPHEDRINE SULFATE 15 MG: 50 INJECTION INTRAVENOUS at 12:43

## 2024-08-27 RX ADMIN — PROPOFOL 200 MG: 10 INJECTION, EMULSION INTRAVENOUS at 12:26

## 2024-08-27 RX ADMIN — DROPERIDOL 0.62 MG: 2.5 INJECTION, SOLUTION INTRAMUSCULAR; INTRAVENOUS at 14:06

## 2024-08-27 RX ADMIN — HYDROMORPHONE HYDROCHLORIDE 0.5 MG: 1 INJECTION, SOLUTION INTRAMUSCULAR; INTRAVENOUS; SUBCUTANEOUS at 12:40

## 2024-08-27 RX ADMIN — CEFAZOLIN 2 G: 2 INJECTION, POWDER, FOR SOLUTION INTRAMUSCULAR; INTRAVENOUS at 12:32

## 2024-08-27 RX ADMIN — FENTANYL CITRATE 50 MCG: 50 INJECTION, SOLUTION INTRAMUSCULAR; INTRAVENOUS at 12:30

## 2024-08-27 RX ADMIN — DROPERIDOL 0.62 MG: 2.5 INJECTION, SOLUTION INTRAMUSCULAR; INTRAVENOUS at 10:58

## 2024-08-27 RX ADMIN — HEPARIN SODIUM 5000 UNITS: 5000 INJECTION INTRAVENOUS; SUBCUTANEOUS at 10:58

## 2024-08-27 RX ADMIN — SODIUM CHLORIDE, POTASSIUM CHLORIDE, SODIUM LACTATE AND CALCIUM CHLORIDE: 600; 310; 30; 20 INJECTION, SOLUTION INTRAVENOUS at 12:23

## 2024-08-27 NOTE — OP NOTE
Left Lumpectomy with Left axillary [place] Lymph Node biopsy Operative Report:     Patient: Maritza Mobley  MRN: [id number]    YOB: 1950  Age: 74 y.o.  Sex: female  Unit:  PAD OR Room/Bed: PAD OR/MAIN OR Location: [facility]      Admitting Physician: JOSR GARCIA    Primary Care Physician: Mukul Parker MD             INDICATIONS: Maritza Mobley is a 74 y.o. female with a 3 mm invasive lobular carcinoma of the left breast at 1:00, 4 cm FN, ER+AL+HER2-. I personally reviewed her imaging including ultrasounds and mammograms, as well as the pathology report. I had a long discussion with the patient on the pathology of invasive lobular carcinoma. We need to proceed with MRI due to the lobular pathology. As long as there are no other concerning lesions on the MRI, will proceed with upfront surgery. I offered her a lumpectomy with sentinel lymph node biopsy (she can likely omit radiation due to age > 70, T1 ER+ lesion) vs. a mastectomy with sentinel lymph node biopsy - with or without reconstruction vs. Bilateral mastectomy with sentinel lymph node biopsy with or without reconstruction. I did  her that if she proceeds with lumpectomy, we will need negative margins. If we have positive margins, she would require re-resection. I counseled her that there is no difference in survival between the different surgical options. I discussed the risks of surgery, including but not limited to bleeding, infection, damage to surrounding structures, and cardiopulmonary complications as well as the benefits with her. She understands that a lumpectomy is an outpatient procedure and that a mastectomy would require drains and an overnight stay in the hospital for drain teaching and pain control. She has elected to proceed with a left lumpectomy with sentinel lymph node biopsy. She was offered a referral to plastic surgery to discuss reconstruction and is not interested at this time.  She is  currently scheduled for the above operation on 8/27/24.     DATE OF OPERATION: 8/27/2024     Surgeons and Role:     * Grace Hayes MD - Primary  Ruthann Maya PA-C - assist    ANESTHESIA: General     PREOPERATIVE DIAGNOSIS: Invasive lobular carcinoma of breast in female [C50.919]    POSTOPERATIVE DIAGNOSIS: Same    PROCEDURES PERFORMED:    (1) Left Lumpectomy   (2) Left Axillary [place] Lymph Node Biopsy     PROCEDURE DETAILS:    After informed consent was obtained, the patient was brought to the operating room and the site of surgery was confirmed. General anesthesia was induced.  Then 2 mL of Methylene blue diluted in 3 mL injectable saline as well as Magtrace were injected periareolar on the left breast. It was then massaged. The left chest and left axilla were prepped with ChloraPrep and draped in sterile fashion. A time out was completed verifying the correct patient, procedure, site, positioning and special equipment needed prior to incision.   The Mag-Probe was used to localize the excision target with the preoperatively placed Magseed on the left breast in the upper outer quadrant. Local anesthetic was infiltrated. An incision was made over the seed and lesion. Skin flaps were raised circumferentially. I dissected down to the lesion with cautery, ensuring to use the Mag-Probe to guide me. When I was close to the lesion based on counts, a plastic Wingate was placed on the tissue and the lesion and a rim of normal tissue was dissected free with cautery. It was marked with a short stitch superior, double long lateral and single long anterior. It was placed on a grid in the trident and then sent for permanent pathology. Based on the mammogram, I elected to take an additional superior margin. This was marked with a stitch on the new margin. The mammogram noted the seed, clip and lesion in the original specimen. Hemostasis was obtained in the cavity. The cavity was irrigated. Clips were placed for  post-op radiation guidance. The incision was closed with interrupted 3-0 Vicryl dermal sutures followed by a running 4-0 Monocryl subcuticular.   I then turned my attention to the left sentinel lymph node biopsy. The Mag-Probe was used to localize the node. Local anesthetic was infiltrated. An incision was made over the node. Dissection was carried down to the node using the Mag-Probe as guidance. The sentinel node was noted to be hot. It was removed with cautery, ensuring to clip the vascular and lymphatic pedicles. It was in the deep plane just superior to the muscle. There were no nodes that were at least 10% of the sentinel node, no palpable nodes, and no blue nodes. The cavity was irrigated and hemostasis was confirmed. Surgicel powder was placed in the cavity. The incision was closed with interrupted 3-0 Vicryl dermal sutures followed by a running 4-0 Monocryl subcuticular.   Both incisions were dressed with skin glue. The breast incision was then dressed with Telfa and Tegaderm and covered with a surgical bra. The patient tolerated the procedure well and was transferred to PACU in good condition.     Ruthann Maya PA-C was responsible for performing the following activities: Retraction, Suction, Irrigation, Suturing, Closing, and Placing Dressing and their skilled assistance was necessary for the success of this case.    Findings: seed, clip and lesion in specimen; one hot axillary sentinel node    Estimated Blood Loss:  20 mL   Complications: none apparent   Specimens:   (1) Left lumpectomy - double short superior, double long left lateral, single long anterior   (2) Superior margin - stitch marks new margin   (3) left axillary sentinel node      [place] Node Biopsy for Breast Cancer - Left  Operation performed with curative intent. Yes   Tracer(s) used to identify sentinel nodes in the upfront surgery (non-neoadjuvant) setting (select all that apply). Dye and Superparamagnetic iron oxide   Tracer(s)  used to identify sentinel nodes in the neoadjuvant setting (select all that apply). N/A   All nodes (colored or non-colored) present at the end of a dye-filled lymphatic channel were removed. Yes   All significantly radioactive nodes were removed. Yes   All palpably suspicious nodes were removed. Yes   Biopsy-proven positive nodes marked with clips prior to chemotherapy were identified and removed. N/A           Disposition: PACU - hemodynamically stable.           Condition: stable    Grace Hayes MD  08/05/2024

## 2024-08-27 NOTE — ANESTHESIA PROCEDURE NOTES
Airway  Date/Time: 8/27/2024 12:27 PM    General Information and Staff    CRNA/CAA: Rafa Watkins CRNA    Indications and Patient Condition  Indications for airway management: CNS depression    Preoxygenated: yes  Mask difficulty assessment: 0 - not attempted    Final Airway Details  Final airway type: supraglottic airway      Successful airway: unique  Size 3     Number of attempts at approach: 1  Assessment: lips, teeth, and gum same as pre-op and atraumatic intubation    Additional Comments  Atraumatic Insertion

## 2024-08-27 NOTE — ANESTHESIA POSTPROCEDURE EVALUATION
Patient: Maritza Mobley    Procedure Summary       Date: 08/27/24 Room / Location:  PAD OR 06 /  PAD OR    Anesthesia Start: 1223 Anesthesia Stop: 1347    Procedure: LEFT BREAST LUMPECTOMY WITH MAG SEED GUIDANCE AND MAGTRACE GUIDED SENTINEL LYMPH NODE BIOPSY (Left: Breast) Diagnosis:       Invasive lobular carcinoma of breast in female      (Invasive lobular carcinoma of breast in female [C50.919])    Surgeons: Grace Hayes MD Provider: Rafa Watkins CRNA    Anesthesia Type: general ASA Status: 2            Anesthesia Type: general    Vitals  Vitals Value Taken Time   /65 08/27/24 1436   Temp 97.3 °F (36.3 °C) 08/27/24 1429   Pulse 70 08/27/24 1436   Resp 16 08/27/24 1436   SpO2 93 % 08/27/24 1436           Post Anesthesia Care and Evaluation    Patient location during evaluation: PACU  Patient participation: complete - patient participated  Level of consciousness: awake and awake and alert  Pain score: 0  Pain management: adequate    Airway patency: patent  Anesthetic complications: No anesthetic complications  PONV Status: none  Cardiovascular status: acceptable  Respiratory status: acceptable  Hydration status: acceptable    Comments: Patient discharged according to acceptable Suraj score per RN assessment. See nursing records for further information.     Blood pressure 136/74, pulse 77, temperature 97.3 °F (36.3 °C), resp. rate 16, SpO2 96%.

## 2024-08-27 NOTE — ANESTHESIA PREPROCEDURE EVALUATION
Anesthesia Evaluation     Patient summary reviewed   history of anesthetic complications:  PONV  NPO Solid Status: > 8 hours             Airway   Mallampati: II  Dental    (+) upper dentures and lower dentures    Pulmonary    (-) COPD, asthma, sleep apnea, not a smoker  Cardiovascular   Exercise tolerance: good (4-7 METS)    (+) hypertension  (-) pacemaker, past MI, angina, cardiac stents      Neuro/Psych  (-) seizures, TIA, CVA  GI/Hepatic/Renal/Endo    (+) GERD, renal disease- CRI, thyroid problem hypothyroidism  (-) liver disease, diabetes    Musculoskeletal     Abdominal    Substance History      OB/GYN          Other                          Anesthesia Plan    ASA 2     general     (Very severe ponv; propofol component gtt )  intravenous induction     Anesthetic plan, risks, benefits, and alternatives have been provided, discussed and informed consent has been obtained with: patient.        CODE STATUS:

## 2024-08-27 NOTE — DISCHARGE INSTRUCTIONS
Wound:   - you have skin glue on your incisions.Keep Okay to shower tomorrow.   - Leave skin glue in place, it should slowly fall off over 2 weeks   - No swimming/soaking/bathing x 2 weeks to allow incisions to heal.     Activity:   - Activity as tolerated. NO heavy lifting x 4 weeks - no more than 25lb at a time - with the left arm   - No driving or operating machinery on narcotic pain medication.     Pain medication:   - Take 1000mg of tylenol every 8 hours for 3 days. After three days, take it prn.   - Take 600mg of ibuprofen (motrin) every 8 hours for 3 days. After three days, take it prn.   - You have a prescription for a narcotic. It will be ultram tabs. Take these only as needed after you have taken the tylenol and ibuprofen. If you are taking the ultram, make sure to take a stool softener (colace) with it as it can cause constipation.   - The narcotic may make you nauseated, you will have a prescription for zofran in case of nausea.     Follow up:   - make an appointment to see me in 1 week   - If you have any concerns before then, call me office at [phone]

## 2024-08-29 ENCOUNTER — TELEPHONE (OUTPATIENT)
Dept: SURGERY | Facility: CLINIC | Age: 74
End: 2024-08-29
Payer: MEDICARE

## 2024-08-29 NOTE — TELEPHONE ENCOUNTER
Post OP phone call visit:    Type of surgery: Left breast lumpectomy with magseed guidance and magtrace guided sentinel lymph node biopsy.   How are you feeling? I am making it.   Are you having any pain or Nausea? Tylenol. A little nausea after surgery, but its gone now.   Do you have a normal appetite? Not a huge appetite but eating foods.   Are you passing gas and having any BM? Having BM's.   How is your activity? Good.   Any drainage or fever? A lot bruised. No redness. No drainage. Low grade fever  degree F. Encouraged use of Tylenol, increase activity, and drink plenty of fluids.

## 2024-08-30 ENCOUNTER — TELEPHONE (OUTPATIENT)
Dept: SURGERY | Facility: CLINIC | Age: 74
End: 2024-08-30
Payer: MEDICARE

## 2024-08-30 LAB
CYTO UR: NORMAL
LAB AP CASE REPORT: NORMAL
LAB AP DIAGNOSIS COMMENT: NORMAL
Lab: NORMAL
PATH REPORT.FINAL DX SPEC: NORMAL
PATH REPORT.GROSS SPEC: NORMAL

## 2024-08-30 NOTE — TELEPHONE ENCOUNTER
Patient called and stated she has developed a virus and now has a cough she is 3 days breast post op informed her that she should reach out to her pcp and let them know she is post op surgery to see if they can give her an antibiotic and if she needed anything from our office to give us a call back patient voiced understanding

## 2024-09-04 ENCOUNTER — OFFICE VISIT (OUTPATIENT)
Dept: SURGERY | Facility: CLINIC | Age: 74
End: 2024-09-04
Payer: MEDICARE

## 2024-09-04 VITALS
SYSTOLIC BLOOD PRESSURE: 130 MMHG | OXYGEN SATURATION: 98 % | DIASTOLIC BLOOD PRESSURE: 80 MMHG | WEIGHT: 168 LBS | HEIGHT: 62 IN | HEART RATE: 53 BPM | BODY MASS INDEX: 30.91 KG/M2

## 2024-09-04 DIAGNOSIS — C50.919 INVASIVE LOBULAR CARCINOMA OF BREAST IN FEMALE: Primary | ICD-10-CM

## 2024-09-04 PROCEDURE — 1159F MED LIST DOCD IN RCRD: CPT | Performed by: STUDENT IN AN ORGANIZED HEALTH CARE EDUCATION/TRAINING PROGRAM

## 2024-09-04 PROCEDURE — 99024 POSTOP FOLLOW-UP VISIT: CPT | Performed by: STUDENT IN AN ORGANIZED HEALTH CARE EDUCATION/TRAINING PROGRAM

## 2024-09-04 PROCEDURE — 1160F RVW MEDS BY RX/DR IN RCRD: CPT | Performed by: STUDENT IN AN ORGANIZED HEALTH CARE EDUCATION/TRAINING PROGRAM

## 2024-09-04 NOTE — PATIENT INSTRUCTIONS

## 2024-09-04 NOTE — PROGRESS NOTES
"Patient: Maritza Mobley    YOB: 1950    Date: 09/04/2024    Primary Care Provider: Mukul Parker MD    Vital Signs:   Vitals:    09/04/24 0954   BP: 130/80   BP Location: Right arm   Patient Position: Sitting   Cuff Size: Adult   Pulse: 53   SpO2: 98%   Weight: 76.2 kg (168 lb)   Height: 157.5 cm (62\")       The patient is tolerating a regular diet and has no complaints s/p left lumpectomy with sentinel lymph node biopsy. The patient denies fevers, chills, nausea, vomiting, and excessive pain. Incisions well healed. No seroma.     Results Review:   I reviewed the patient's new clinical results.  Tissue Pathology Exam (08/27/2024 12:34)   1.  Left breast, lumpectomy:  Previous lobular carcinoma.  Previous biopsy site.  No malignancy is identified.  Benign ducts, apocrine metaplasia, fibrocystic change, and terminal lobular units.  The surgical margins are free of tumor.  See comment.     2.  Left breast, superior margin, excision:  No malignancy identified.  Benign ducts, fibrocystic change, and terminal lobular units.  The surgical margins are free of tumor.     3.  Left axillary sentinel lymph nodes, excision:  6 benign lymph nodes.  Immunohistochemical stain for pankeratin is negative.    Assessment / Plan:    Diagnoses and all orders for this visit:    1. Invasive lobular carcinoma of breast in female (Primary)  -     Ambulatory Referral to Oncology      Due to T1, > 70 years old and ER+ could potentially omit radiation. Will discuss with Dr. Osorio. Follow up in 4 weeks.     Electronically signed by Grace Hayes MD  09/04/24  10:14 CDT            "

## 2024-09-10 ENCOUNTER — OFFICE VISIT (OUTPATIENT)
Dept: HEMATOLOGY | Age: 74
End: 2024-09-10
Payer: MEDICARE

## 2024-09-10 VITALS
DIASTOLIC BLOOD PRESSURE: 58 MMHG | RESPIRATION RATE: 14 BRPM | HEART RATE: 62 BPM | BODY MASS INDEX: 30.18 KG/M2 | WEIGHT: 165 LBS | SYSTOLIC BLOOD PRESSURE: 126 MMHG | OXYGEN SATURATION: 99 %

## 2024-09-10 DIAGNOSIS — N95.1 HOT FLASHES DUE TO MENOPAUSE: ICD-10-CM

## 2024-09-10 DIAGNOSIS — Z71.89 CARE PLAN DISCUSSED WITH PATIENT: ICD-10-CM

## 2024-09-10 DIAGNOSIS — T38.6X5D ADVERSE EFFECT OF TAMOXIFEN, SUBSEQUENT ENCOUNTER: ICD-10-CM

## 2024-09-10 DIAGNOSIS — C50.912: Primary | ICD-10-CM

## 2024-09-10 DIAGNOSIS — Z91.89 AT RISK FOR LYMPHEDEMA: ICD-10-CM

## 2024-09-10 PROCEDURE — G2211 COMPLEX E/M VISIT ADD ON: HCPCS | Performed by: INTERNAL MEDICINE

## 2024-09-10 PROCEDURE — 3017F COLORECTAL CA SCREEN DOC REV: CPT | Performed by: INTERNAL MEDICINE

## 2024-09-10 PROCEDURE — 1036F TOBACCO NON-USER: CPT | Performed by: INTERNAL MEDICINE

## 2024-09-10 PROCEDURE — 1123F ACP DISCUSS/DSCN MKR DOCD: CPT | Performed by: INTERNAL MEDICINE

## 2024-09-10 PROCEDURE — 1090F PRES/ABSN URINE INCON ASSESS: CPT | Performed by: INTERNAL MEDICINE

## 2024-09-10 PROCEDURE — 3078F DIAST BP <80 MM HG: CPT | Performed by: INTERNAL MEDICINE

## 2024-09-10 PROCEDURE — 3074F SYST BP LT 130 MM HG: CPT | Performed by: INTERNAL MEDICINE

## 2024-09-10 PROCEDURE — G8399 PT W/DXA RESULTS DOCUMENT: HCPCS | Performed by: INTERNAL MEDICINE

## 2024-09-10 PROCEDURE — G8417 CALC BMI ABV UP PARAM F/U: HCPCS | Performed by: INTERNAL MEDICINE

## 2024-09-10 PROCEDURE — G8427 DOCREV CUR MEDS BY ELIG CLIN: HCPCS | Performed by: INTERNAL MEDICINE

## 2024-09-10 PROCEDURE — 99205 OFFICE O/P NEW HI 60 MIN: CPT | Performed by: INTERNAL MEDICINE

## 2024-09-10 RX ORDER — TAMOXIFEN CITRATE 10 MG/1
10 TABLET ORAL DAILY
Qty: 90 TABLET | Refills: 3 | Status: SHIPPED | OUTPATIENT
Start: 2024-09-10

## 2024-09-10 RX ORDER — OXYBUTYNIN CHLORIDE 5 MG/1
5 TABLET ORAL 2 TIMES DAILY
Qty: 60 TABLET | Refills: 0 | Status: SHIPPED | OUTPATIENT
Start: 2024-09-10

## 2024-09-25 ENCOUNTER — OFFICE VISIT (OUTPATIENT)
Dept: SURGERY | Facility: CLINIC | Age: 74
End: 2024-09-25
Payer: MEDICARE

## 2024-09-25 VITALS
BODY MASS INDEX: 30.91 KG/M2 | DIASTOLIC BLOOD PRESSURE: 105 MMHG | HEIGHT: 62 IN | WEIGHT: 168 LBS | SYSTOLIC BLOOD PRESSURE: 171 MMHG | HEART RATE: 82 BPM | OXYGEN SATURATION: 97 %

## 2024-09-25 DIAGNOSIS — E66.09 CLASS 1 OBESITY DUE TO EXCESS CALORIES WITH BODY MASS INDEX (BMI) OF 31.0 TO 31.9 IN ADULT, UNSPECIFIED WHETHER SERIOUS COMORBIDITY PRESENT: ICD-10-CM

## 2024-09-25 DIAGNOSIS — C50.919 INVASIVE LOBULAR CARCINOMA OF BREAST IN FEMALE: Primary | ICD-10-CM

## 2024-09-25 PROBLEM — Z98.890 S/P LUMPECTOMY, LEFT BREAST: Status: ACTIVE | Noted: 2024-09-25

## 2024-09-25 PROBLEM — Z98.890 S/P LYMPH NODE BIOPSY: Status: ACTIVE | Noted: 2024-09-25

## 2024-09-25 PROCEDURE — 1160F RVW MEDS BY RX/DR IN RCRD: CPT | Performed by: STUDENT IN AN ORGANIZED HEALTH CARE EDUCATION/TRAINING PROGRAM

## 2024-09-25 PROCEDURE — 1159F MED LIST DOCD IN RCRD: CPT | Performed by: STUDENT IN AN ORGANIZED HEALTH CARE EDUCATION/TRAINING PROGRAM

## 2024-09-25 PROCEDURE — 99024 POSTOP FOLLOW-UP VISIT: CPT | Performed by: STUDENT IN AN ORGANIZED HEALTH CARE EDUCATION/TRAINING PROGRAM

## 2024-09-25 NOTE — PROGRESS NOTES
[facility]  Radiation Oncology Clinic   Jasson Welch MD, FACR  Surjit Carreon MARIS  _______________________________________________  [facility]  Department of Radiation Oncology  [address] [place] [id number]  Office: [phone]  Fax: [phone]    DATE: 09/27/2024  PATIENT: Maritza Mobley  1950                         MEDICAL RECORD #: [id number]    1. Invasive lobular carcinoma of breast in female    2. S/P lumpectomy, left breast    3. S/P lymph node biopsy                                      REASON FOR VISIT:    Chief Complaint   Patient presents with    Breast Cancer     Maritza Mobley is a very pleasant 74 y.o. female that has been referred to our clinic to discuss radiotherapy considerations for breast carcinoma. Denies appetite change, unexpected weight change, nasuea/vomiting, diarrhea, light-headedness, weakness, and headaches. She follow  and .     History of Present Illness:    06/11/2024 - Bilateral Screening Mammogram:  LEFT breast focal asymmetry. Recommend LEFT spot combo CC/MLO views with full field true lateral and possible targeted ultrasound.  No mammographic evidence of malignancy in the RIGHT breast.    06/19/2024 - Left Diagnostic Mammogram:  3 mm nodule with partially obscured and partially sharply defined margins is confirmed in the upper outer quadrant of the left breast 1 o'clock position 4 cm from the nipple.. Subsequent ultrasound confirms a 3 mm x 3 mm well-circumscribed hypoechoic lesion with improved through transmission. It does contain some internal echogenicity and I would favor a cyst containing some debris/proteinaceous material. Given its rather benign sonographic features I feel it could be followed up with subsequent imaging with repeat mammography and ultrasound in 6 months to assure stability.  ACR BI-RADS Category 3 probably benign findings. 6-month follow-up left  breast mammogram and ultrasound recommended.    07/15/2024 - Left breast mass at 1 o'clock position, core biopsies:  Invasive lobular carcinoma, grade 2.  Tumor is 4.2 mm in greatest linear length.  AJCC stage: pTX pNX  ER, MD +   Her-2 1+ negative    08/06/2024 - MRI Bilateral Breasts with and without contrast:  LEFT breast biopsy marker. No focally suspicious enhancement in the RIGHT or LEFT breast. Lack of detectable enhancement on MRI does not exclude residual pathologic disease after core biopsy.  No adenopathy.  BI-RADS CATEGORY 6: Known biopsy-proven malignancy  Management Recommendation: Surgical excision when clinically appropriate.    08/27/2024 - Left breast lumpectomy and left axillary sentinel lymph node excision per :  Left breast, lumpectomy:  Previous lobular carcinoma.  Previous biopsy site.  No malignancy is identified.  Benign ducts, apocrine metaplasia, fibrocystic change, and terminal lobular units.  The surgical margins are free of tumor.  See comment.  Left breast, superior margin, excision:  No malignancy identified.  Benign ducts, fibrocystic change, and terminal lobular units.  The surgical margins are free of tumor.  Left axillary sentinel lymph nodes, excision:  6 benign lymph nodes.  Immunohistochemical stain for pankeratin is negative.    09/04/2024 - Appointment with :  Due to T1, > 70 years old and ER+ could potentially omit radiation.   Will discuss with Dr. Osorio.   Follow up in 4 weeks.     09/10/2024 - Appointment with Dr. Osorio:  Invasive lobular carcinoma, 1 o'clock left breast, Grade 2, ER 93%, MD 95%, HER-2 1+/negative, Ki67 3%, July 2024  The patient was counseled today about diagnosis, staging, prognosis, diagnostic tests, medications, side effects and disease management.   Low clinical risk  Essentially, pT1a N0 M0, stage Ia ILC breast status post lumpectomy and sentinel lymph node biopsy  I do not recommend adjuvant chemotherapy   Discussion of  adjuvant endocrine therapy with low-dose tamoxifen 5 mg p.o. daily  PLAN:  RTC with MD 2024  Continue follow-up with Dr Grace Hayes/[facility]  Referred to Dr. Jasson Perrin for discussion of adjuvant therapy  Recommend tamoxifen preventative endocrine therapy 5 mg p.o. daily x 5 years  30-day trial of oxybutynin 2.5 mg p.o. daily. .    2024 - Appointment with :  She is on Tamoxifen.   She sees Dr. Perrin on Friday.   Follow up in 2 months.       History obtained from  PATIENT and CHART    PAST MEDICAL HISTORY  Past Medical History:   Diagnosis Date    Arthritis     Breast cancer     Colitis     Disease of thyroid gland     Dysautonomia     GERD (gastroesophageal reflux disease)     Hashimoto thyroiditis, fibrous variant     Hyperlipidemia     Hypertension     Kidney disease     Osteopenia     PONV (postoperative nausea and vomiting)       PAST SURGICAL HISTORY  Past Surgical History:   Procedure Laterality Date    ANKLE SURGERY Right     ARM LESION/CYST EXCISION Left 2023    Procedure: LEFT RING FINGER MASS EXCISION;  Surgeon: Klever Gonzalez MD;  Location: [facility] OR;  Service: Orthopedics;  Laterality: Left;    BREAST LUMPECTOMY WITH SENTINEL NODE BIOPSY Left 2024    Procedure: LEFT BREAST LUMPECTOMY WITH MAG SEED GUIDANCE AND MAGTRACE GUIDED SENTINEL LYMPH NODE BIOPSY;  Surgeon: Grace Hayes MD;  Location: [facility] OR;  Service: General;  Laterality: Left;    CARDIAC CATHETERIZATION      x2    CERVICAL SPINE SURGERY       SECTION      x2    DILATATION AND CURETTAGE      HYSTERECTOMY        FAMILY HISTORY  family history is not on file.    SOCIAL HISTORY  Social History     Tobacco Use    Smoking status: Never     Passive exposure: Never    Smokeless tobacco: Never   Vaping Use    Vaping status: Never Used   Substance Use Topics    Alcohol use: Not Currently    Drug use: Never     ALLERGIES  Levaquin [levofloxacin], Contrast dye (echo or unknown ct/mr),  Metoprolol, Demerol [meperidine], Morphine, and Percocet [oxycodone-acetaminophen]     MEDICATIONS    Current Outpatient Medications:     calcium carbonate (OS-LEYDI) 600 MG tablet, Take 1 tablet by mouth Daily., Disp: , Rfl:     carbidopa-levodopa-entacapone (STALEVO) 31.-200 MG per tablet, Take 1 tablet by mouth 2 (Two) Times a Day., Disp: , Rfl:     Cranberry (Ellura) 200 MG capsule, Take 200 mg by mouth Daily., Disp: , Rfl:     Diflucan 200 MG tablet, Take 1 tablet by mouth Daily., Disp: , Rfl:     dilTIAZem CD (CARDIZEM CD) 120 MG 24 hr capsule, Take 1 capsule by mouth Daily., Disp: , Rfl:     ezetimibe (ZETIA) 10 MG tablet, Take 1 tablet by mouth Daily., Disp: , Rfl:     levothyroxine (SYNTHROID, LEVOTHROID) 75 MCG tablet, Take 1 tablet by mouth Daily., Disp: , Rfl:     NALTREXONE HCL PO, Take 4.5 mg by mouth Daily. LOW DOSE FOR AUTOIMMUNE DISEASE, Disp: , Rfl:     nitrofurantoin (MACRODANTIN) 100 MG capsule, Take 1 capsule by mouth As Needed., Disp: , Rfl:     nitroglycerin (NITROSTAT) 0.4 MG SL tablet, Place 1 tablet under the tongue Every 5 (Five) Minutes As Needed for Chest Pain. Take no more than 3 doses in 15 minutes., Disp: , Rfl:     Nutritional Supplements (HOMOCYSTEINE SUPPORT PO), Take  by mouth Daily., Disp: , Rfl:     omeprazole (priLOSEC) 40 MG capsule, Take 1 capsule by mouth Daily., Disp: , Rfl:     Prasterone, DHEA, (DHEA PO), Take 5 mg by mouth 2 (Two) Times a Day., Disp: , Rfl:     Pregnenolone powder, , Disp: , Rfl:     Specialty Vitamins Products (Adrenal Stress Calm) tablet, Take  by mouth Daily., Disp: , Rfl:     Thyroid 60 MG PO tablet, Take 1 tablet by mouth Daily., Disp: , Rfl:     valACYclovir (VALTREX) 500 MG tablet, Take 1 tablet by mouth As Needed., Disp: , Rfl:     Zinc Gluconate 30 MG tablet, Take 1 tablet by mouth Daily., Disp: , Rfl:     Current Facility-Administered Medications:     lidocaine (XYLOCAINE) 1 % injection 10 mL, 10 mL, Subcutaneous, Once, Martha Cruz,  "MD    lidocaine (XYLOCAINE) 1 % injection 10 mL, 10 mL, Subcutaneous, Once, Eleno Lang MD    lidocaine 1% - EPINEPHrine 1:068987 (XYLOCAINE W/EPI) 1 %-1:813915 injection 10 mL, 10 mL, Injection, Once, Martha Cruz MD    The following portions of the patient's history were reviewed and updated as appropriate: allergies, current medications, past family history, past medical history, past social history, past surgical history and problem list.    Current outpatient and discharge medications have been reconciled for the patient.  Reviewed by: Jasson Perrin III, MD    REVIEW OF SYSTEMS  Review of Systems   Constitutional: Negative.    HENT: Negative.     Eyes: Negative.         Glasses   Respiratory: Negative.     Cardiovascular: Negative.    Gastrointestinal: Negative.    Endocrine: Negative.    Genitourinary: Negative.    Musculoskeletal: Negative.         Good ROM to left arm   Skin: Negative.    Allergic/Immunologic: Negative.    Neurological: Negative.    Hematological: Negative.    Psychiatric/Behavioral: Negative.       PHYSICAL EXAM  VITAL SIGNS:   Vitals:    09/27/24 1044   BP: 157/78   Weight: 76.9 kg (169 lb 9.6 oz)   Height: 157.5 cm (62\")   PainSc: 0-No pain     Physical Exam  Vitals reviewed.   Constitutional:       Appearance: Normal appearance.   HENT:      Head: Normocephalic.      Nose: Nose normal.   Eyes:      Pupils: Pupils are equal, round, and reactive to light.   Cardiovascular:      Rate and Rhythm: Normal rate and regular rhythm.      Pulses: Normal pulses.      Heart sounds: Normal heart sounds.   Pulmonary:      Effort: Pulmonary effort is normal. No respiratory distress.      Breath sounds: Normal breath sounds. No wheezing.   Abdominal:      General: Bowel sounds are normal.      Palpations: There is no mass.   Musculoskeletal:         General: Normal range of motion.      Cervical back: Normal range of motion and neck supple. No tenderness.   Lymphadenopathy:      Cervical: " No cervical adenopathy.   Skin:     General: Skin is warm and dry.      Capillary Refill: Capillary refill takes less than 2 seconds.   Neurological:      General: No focal deficit present.      Mental Status: She is alert and oriented to person, place, and time.      Motor: No weakness.   Psychiatric:         Mood and Affect: Mood normal.         Behavior: Behavior normal.         Performance Status: ECOG (0) Fully active, able to carry on all predisease performance without restriction    Clinical Quality Measures  -Pain Documented by Standardized Tool, FPS Maritza Mobley reports a pain score of 0. Given her pain assessment as noted, treatment options were discussed and the following options were decided upon as a follow-up plan to address the patient's pain:  no pain, no plan given .   Pain Medications           - Body Mass Index Screening and Follow-Up Plan Body mass index is 31.02 kg/m².    Tobacco Use: Screening and Cessation Intervention  Social History    Tobacco Use      Smoking status: Never        Passive exposure: Never      Smokeless tobacco: Never    Advanced Care Planning   Advance Care Planning  ACP discussion was held with the patient during this visit. Patient has an advance directive in EMR which is still valid.      - Depression screening - PHQ-9 Total Score: 0    ASSESSMENT AND PLAN  1. Invasive lobular carcinoma of breast in female    2. S/P lumpectomy, left breast    3. S/P lymph node biopsy        RECOMMENDATIONS: Maritza Mobley was diagnosed with early stage breast cancer.    We have discussed the role of radiation therapy with this diagnosis as well as the indications and rationale of adjuvant radiation therapy according to the NCCN Guidelines.     I did review the options of whole breast radiation, partial breast radiation, or omitting radiation with adjuvant endocrine therapy.  With her small tumor size and hormonal receptor status I recommend omitting radiation at this  time.    She does understand she could have a local recurrence which could require additional surgery and at that point may need to reconsider radiotherapy.    The patient and/or family verbalize understanding of this discussion, voice no further questions and wish to proceed with surveillance only.    She will continue ongoing management per primary care physician and other specialists.     Thank you for allowing me to assist in  her  care.     Time Spent: I spent 60 minutes caring for Maritza on this date of service. This time includes time spent by me in the following activities: preparing for the visit, reviewing tests, obtaining and/or reviewing a separately obtained history, performing a medically appropriate examination and/or evaluation, counseling and educating the patient/family/caregiver, ordering medications, tests, or procedures, and documenting information in the medical record.   Jasson Perrin III, MD  09/27/2024

## 2024-09-26 ENCOUNTER — HOSPITAL ENCOUNTER (OUTPATIENT)
Dept: RADIATION ONCOLOGY | Facility: HOSPITAL | Age: 74
Setting detail: RADIATION/ONCOLOGY SERIES
End: 2024-09-26
Payer: MEDICARE

## 2024-09-27 ENCOUNTER — CONSULT (OUTPATIENT)
Age: 74
End: 2024-09-27
Payer: MEDICARE

## 2024-09-27 VITALS
SYSTOLIC BLOOD PRESSURE: 157 MMHG | WEIGHT: 169.6 LBS | BODY MASS INDEX: 31.21 KG/M2 | DIASTOLIC BLOOD PRESSURE: 78 MMHG | HEIGHT: 62 IN

## 2024-09-27 DIAGNOSIS — Z98.890 S/P LYMPH NODE BIOPSY: ICD-10-CM

## 2024-09-27 DIAGNOSIS — C50.919 INVASIVE LOBULAR CARCINOMA OF BREAST IN FEMALE: Primary | ICD-10-CM

## 2024-09-27 DIAGNOSIS — Z98.890 S/P LUMPECTOMY, LEFT BREAST: ICD-10-CM

## 2024-09-27 PROCEDURE — G0463 HOSPITAL OUTPT CLINIC VISIT: HCPCS | Performed by: RADIOLOGY

## 2024-09-27 RX ORDER — FLUCONAZOLE 200 MG/1
200 TABLET ORAL DAILY
COMMUNITY
Start: 2024-09-26

## 2024-10-09 ENCOUNTER — HOSPITAL ENCOUNTER (OUTPATIENT)
Dept: OCCUPATIONAL THERAPY | Age: 74
Setting detail: THERAPIES SERIES
Discharge: HOME | End: 2024-10-09
Payer: MEDICARE

## 2024-10-09 PROCEDURE — 97165 OT EVAL LOW COMPLEX 30 MIN: CPT

## 2024-10-09 PROCEDURE — 93702 BIS XTRACELL FLUID ANALYSIS: CPT

## 2024-10-09 NOTE — PROGRESS NOTES
Occupational Therapy: Initial Evaluation   Patient: Ester Blackwell (74 y.o. female)   Examination Date: 10/09/2024  Plan of Care Certification Period: 10/9/2024 to  10/9/204      :  1950  MRN: [id number]  CSN: [id number]   Insurance: Payor: MEDICARE / Plan: MEDICARE PART A AND B / Product Type: *No Product type* /   Insurance ID: [id number] - (Medicare) Secondary Insurance (if applicable): [employer] [facility]   Insurance Information: Medicare & [facility]   Referring Physician: Albert Sweeney MD Wederson Claudino, MD   PCP: Sulaiman Sena MD Visits to Date/Visits Approved:   /      No Show/Cancelled Appts:    /       Medical Diagnosis: Malignant neoplasm of unspecified site of left female breast [C50.912] C50.912  No data recorded     PERTINENT MEDICAL HISTORY   Patient assessed for rehabilitation services?: Yes  Self reported health status:: Excellent    Medical History: Chart Reviewed: Yes   Past Medical History:   Diagnosis Date    Adrenal insufficiency (HCC)     Autonomic nervous system disease or syndrome     CFS (chronic fatigue syndrome)     GERD (gastroesophageal reflux disease)     Hypertension     Kidney disease     third stage    Thyroid condition      Surgical History:   Past Surgical History:   Procedure Laterality Date    APPENDECTOMY      CARDIAC CATHETERIZATION       SECTION      x2    CHOLECYSTECTOMY      COLONOSCOPY      COLONOSCOPY N/A 2018    Dr Stanton-Tubular AP (-) dysplasia x 1, HP x 2, BCM x 1--5 yr recall    FINGER SURGERY Left 2023    HYSTERECTOMY (CERVIX STATUS UNKNOWN)      NECK SURGERY      OVARY REMOVAL      TN EGD TRANSORAL BIOPSY SINGLE/MULTIPLE N/A 2018    Dr Stanton- Jumana (-)    UPPER GASTROINTESTINAL ENDOSCOPY       Allergies: Demerol hcl [meperidine], Naltrexone, Metoprolol, Nirmatrelvir-ritonavir, Allopurinol, Cefdinir, Cymbalta [duloxetine hcl], Levaquin [levofloxacin in d5w], Statins, Sucralfate, Morphine, and Oxycodone-acetaminophen

## 2024-10-28 RX ORDER — DILTIAZEM HYDROCHLORIDE 120 MG/1
CAPSULE, COATED, EXTENDED RELEASE ORAL DAILY
Qty: 90 CAPSULE | Refills: 1 | OUTPATIENT
Start: 2024-10-28

## 2024-10-28 NOTE — TELEPHONE ENCOUNTER
Called patient and spoke to her  to notify him that patient needs an appt for any more refills. Pt has not been seen in over a year

## 2024-11-25 ENCOUNTER — OFFICE VISIT (OUTPATIENT)
Dept: SURGERY | Facility: CLINIC | Age: 74
End: 2024-11-25
Payer: MEDICARE

## 2024-11-25 VITALS
DIASTOLIC BLOOD PRESSURE: 82 MMHG | HEIGHT: 62 IN | WEIGHT: 158 LBS | SYSTOLIC BLOOD PRESSURE: 122 MMHG | BODY MASS INDEX: 29.08 KG/M2

## 2024-11-25 DIAGNOSIS — C50.919 INVASIVE LOBULAR CARCINOMA OF BREAST IN FEMALE: Primary | ICD-10-CM

## 2024-11-25 DIAGNOSIS — E66.811 CLASS 1 OBESITY DUE TO EXCESS CALORIES WITH BODY MASS INDEX (BMI) OF 31.0 TO 31.9 IN ADULT, UNSPECIFIED WHETHER SERIOUS COMORBIDITY PRESENT: ICD-10-CM

## 2024-11-25 DIAGNOSIS — Z17.0 MALIGNANT NEOPLASM OF OVERLAPPING SITES OF LEFT BREAST IN FEMALE, ESTROGEN RECEPTOR POSITIVE: ICD-10-CM

## 2024-11-25 DIAGNOSIS — C50.812 MALIGNANT NEOPLASM OF OVERLAPPING SITES OF LEFT BREAST IN FEMALE, ESTROGEN RECEPTOR POSITIVE: ICD-10-CM

## 2024-11-25 DIAGNOSIS — Z98.890 S/P LUMPECTOMY, LEFT BREAST: ICD-10-CM

## 2024-11-25 DIAGNOSIS — E66.09 CLASS 1 OBESITY DUE TO EXCESS CALORIES WITH BODY MASS INDEX (BMI) OF 31.0 TO 31.9 IN ADULT, UNSPECIFIED WHETHER SERIOUS COMORBIDITY PRESENT: ICD-10-CM

## 2024-11-25 PROCEDURE — 1160F RVW MEDS BY RX/DR IN RCRD: CPT | Performed by: STUDENT IN AN ORGANIZED HEALTH CARE EDUCATION/TRAINING PROGRAM

## 2024-11-25 PROCEDURE — 99213 OFFICE O/P EST LOW 20 MIN: CPT | Performed by: STUDENT IN AN ORGANIZED HEALTH CARE EDUCATION/TRAINING PROGRAM

## 2024-11-25 PROCEDURE — 1159F MED LIST DOCD IN RCRD: CPT | Performed by: STUDENT IN AN ORGANIZED HEALTH CARE EDUCATION/TRAINING PROGRAM

## 2024-11-25 NOTE — PROGRESS NOTES
Office Established Patient Note:     Referring Provider: No ref. provider found    Chief Complaint   Patient presents with    Follow-up     Breast cancer       Subjective .     History of present illness:  Maritza Mobley is a 74 y.o. female who presents for 3 month follow up. No new breast masses, skin lesions nor nipple discharge. No new family history. She was recommended anti-endocrine therapy by Dr. Osorio but has not started it yet as she has been on antibiotics and did not want to double up on new medicines.     History  Past Medical History:   Diagnosis Date    Arthritis     Breast cancer     Colitis     Disease of thyroid gland     Dysautonomia     GERD (gastroesophageal reflux disease)     Hashimoto thyroiditis, fibrous variant     Hyperlipidemia     Hypertension     Kidney disease     Osteopenia     PONV (postoperative nausea and vomiting)    ,   Past Surgical History:   Procedure Laterality Date    ANKLE SURGERY Right     ARM LESION/CYST EXCISION Left 2023    Procedure: LEFT RING FINGER MASS EXCISION;  Surgeon: Klever Gonzalez MD;  Location: [facility] OR;  Service: Orthopedics;  Laterality: Left;    BREAST LUMPECTOMY WITH SENTINEL NODE BIOPSY Left 2024    Procedure: LEFT BREAST LUMPECTOMY WITH MAG SEED GUIDANCE AND MAGTRACE GUIDED SENTINEL LYMPH NODE BIOPSY;  Surgeon: Grace Hayes MD;  Location: [facility] OR;  Service: General;  Laterality: Left;    CARDIAC CATHETERIZATION      x2    CERVICAL SPINE SURGERY       SECTION      x2    DILATATION AND CURETTAGE      HYSTERECTOMY     ,   Family History   Problem Relation Age of Onset    Breast cancer Neg Hx    ,   Social History     Tobacco Use    Smoking status: Never     Passive exposure: Never    Smokeless tobacco: Never   Vaping Use    Vaping status: Never Used   Substance Use Topics    Alcohol use: Not Currently    Drug use: Never   , (Not in a hospital admission)   and Allergies:  Levaquin [levofloxacin], Contrast dye (echo  or unknown ct/mr), Metoprolol, Demerol [meperidine], Morphine, and Percocet [oxycodone-acetaminophen]    Current Outpatient Medications:     calcium carbonate (OS-LEYDI) 600 MG tablet, Take 1 tablet by mouth Daily., Disp: , Rfl:     carbidopa-levodopa-entacapone (STALEVO) 31.-200 MG per tablet, Take 1 tablet by mouth 2 (Two) Times a Day., Disp: , Rfl:     Cranberry (Ellura) 200 MG capsule, Take 200 mg by mouth Daily., Disp: , Rfl:     Diflucan 200 MG tablet, Take 1 tablet by mouth Daily., Disp: , Rfl:     dilTIAZem CD (CARDIZEM CD) 120 MG 24 hr capsule, Take 1 capsule by mouth Daily., Disp: , Rfl:     ezetimibe (ZETIA) 10 MG tablet, Take 1 tablet by mouth Daily., Disp: , Rfl:     levothyroxine (SYNTHROID, LEVOTHROID) 75 MCG tablet, Take 1 tablet by mouth Daily., Disp: , Rfl:     NALTREXONE HCL PO, Take 4.5 mg by mouth Daily. LOW DOSE FOR AUTOIMMUNE DISEASE, Disp: , Rfl:     nitrofurantoin (MACRODANTIN) 100 MG capsule, Take 1 capsule by mouth As Needed., Disp: , Rfl:     Nutritional Supplements (HOMOCYSTEINE SUPPORT PO), Take  by mouth Daily., Disp: , Rfl:     omeprazole (priLOSEC) 40 MG capsule, Take 1 capsule by mouth Daily., Disp: , Rfl:     Prasterone, DHEA, (DHEA PO), Take 5 mg by mouth 2 (Two) Times a Day., Disp: , Rfl:     Pregnenolone powder, , Disp: , Rfl:     Specialty Vitamins Products (Adrenal Stress Calm) tablet, Take  by mouth Daily., Disp: , Rfl:     Thyroid 60 MG PO tablet, Take 1 tablet by mouth Daily., Disp: , Rfl:     valACYclovir (VALTREX) 500 MG tablet, Take 1 tablet by mouth As Needed., Disp: , Rfl:     Zinc Gluconate 30 MG tablet, Take 1 tablet by mouth Daily., Disp: , Rfl:     nitroglycerin (NITROSTAT) 0.4 MG SL tablet, Place 1 tablet under the tongue Every 5 (Five) Minutes As Needed for Chest Pain. Take no more than 3 doses in 15 minutes. (Patient not taking: Reported on 11/25/2024), Disp: , Rfl:     Current Facility-Administered Medications:     lidocaine (XYLOCAINE) 1 % injection 10 mL,  "10 mL, Subcutaneous, Once, Martha Cruz MD    lidocaine (XYLOCAINE) 1 % injection 10 mL, 10 mL, Subcutaneous, Once, Eleno Lang MD    lidocaine 1% - EPINEPHrine 1:659249 (XYLOCAINE W/EPI) 1 %-1:181815 injection 10 mL, 10 mL, Injection, Once, Martha Cruz MD      Objective     Vital Signs   /82   Ht 157.5 cm (62\")   Wt 71.7 kg (158 lb)   BMI 28.90 kg/m²      Physical Exam:  General appearance - alert, well appearing, and in no distress  Mental status - alert, oriented to person, place, and time  Eyes - pupils equal and reactive, extraocular eye movements intact  Neck - supple, no significant adenopathy  Neurological - alert, oriented, normal speech, no focal findings or movement disorder noted  Breast Exam: Bilateral breasts without obvious deformities. Bilateral nipples everted. Patient examined in the supine position with the ipsilateral arm above the head. No palpable masses bilaterally. No nipple discharge bilaterally. No palpable axillary nor supraclavicular adenopathy bilaterally.     Results Review:     The following data was reviewed by: Grace Hayes MD on 11/25/2024:    Progress Notes by Jasson Perrin III, MD (09/27/2024 11:00)   No radiation   Dr. Osorio's note from 9/10/24.     Assessment & Plan       Diagnoses and all orders for this visit:    1. Invasive lobular carcinoma of breast in female (Primary)  -     Mammo Diagnostic Digital Tomosynthesis Left With CAD; Future    2. S/P lumpectomy, left breast  -     Mammo Diagnostic Digital Tomosynthesis Left With CAD; Future    3. Class 1 obesity due to excess calories with body mass index (BMI) of 31.0 to 31.9 in adult, unspecified whether serious comorbidity present    4. Malignant neoplasm of overlapping sites of left breast in female, estrogen receptor positive  -     Mammo Diagnostic Digital Tomosynthesis Left With CAD; Future       Exam benign. Notes above reviewed. She will start her anti-hormone therapy this week. " No radiation. Left diagnostic mammogram and follow up in 3 months.             Grace Hayes MD  11/25/24  10:10 CST

## 2024-11-25 NOTE — PATIENT INSTRUCTIONS

## 2024-11-26 ENCOUNTER — TELEPHONE (OUTPATIENT)
Dept: HEMATOLOGY | Age: 74
End: 2024-11-26

## 2024-11-26 NOTE — TELEPHONE ENCOUNTER
Called pt. to remind them of appointment on 12/03/2024 and had to leave a detailed voicemail with appointment date and time

## 2024-12-02 NOTE — PROGRESS NOTES
history (History of Present Illness (HPI), Past Family Social History (PFSH), or Review of Systems (ROS) and made changes when appropriated.       (Please note that portions of this note were completed with a voice recognition program. Efforts were made to edit the dictations but occasionally words are mis-transcribed.)Electronically signed by Albert Sweeney MD on 12/3/2024 at 3:39 PM

## 2024-12-03 ENCOUNTER — OFFICE VISIT (OUTPATIENT)
Dept: HEMATOLOGY | Age: 74
End: 2024-12-03
Payer: MEDICARE

## 2024-12-03 VITALS
BODY MASS INDEX: 28.7 KG/M2 | DIASTOLIC BLOOD PRESSURE: 80 MMHG | HEART RATE: 68 BPM | OXYGEN SATURATION: 98 % | SYSTOLIC BLOOD PRESSURE: 138 MMHG | HEIGHT: 63 IN | WEIGHT: 162 LBS

## 2024-12-03 DIAGNOSIS — Z79.811 ENCOUNTER FOR MONITORING AROMATASE INHIBITOR THERAPY: ICD-10-CM

## 2024-12-03 DIAGNOSIS — N95.1 HOT FLASHES DUE TO MENOPAUSE: ICD-10-CM

## 2024-12-03 DIAGNOSIS — R53.83 OTHER FATIGUE: ICD-10-CM

## 2024-12-03 DIAGNOSIS — C50.912: Primary | ICD-10-CM

## 2024-12-03 DIAGNOSIS — Z71.89 CARE PLAN DISCUSSED WITH PATIENT: ICD-10-CM

## 2024-12-03 DIAGNOSIS — Z51.81 ENCOUNTER FOR MONITORING AROMATASE INHIBITOR THERAPY: ICD-10-CM

## 2024-12-03 PROCEDURE — 1036F TOBACCO NON-USER: CPT | Performed by: INTERNAL MEDICINE

## 2024-12-03 PROCEDURE — 3017F COLORECTAL CA SCREEN DOC REV: CPT | Performed by: INTERNAL MEDICINE

## 2024-12-03 PROCEDURE — 1090F PRES/ABSN URINE INCON ASSESS: CPT | Performed by: INTERNAL MEDICINE

## 2024-12-03 PROCEDURE — G8399 PT W/DXA RESULTS DOCUMENT: HCPCS | Performed by: INTERNAL MEDICINE

## 2024-12-03 PROCEDURE — 1123F ACP DISCUSS/DSCN MKR DOCD: CPT | Performed by: INTERNAL MEDICINE

## 2024-12-03 PROCEDURE — 99213 OFFICE O/P EST LOW 20 MIN: CPT | Performed by: INTERNAL MEDICINE

## 2024-12-03 PROCEDURE — G8484 FLU IMMUNIZE NO ADMIN: HCPCS | Performed by: INTERNAL MEDICINE

## 2024-12-03 PROCEDURE — G8427 DOCREV CUR MEDS BY ELIG CLIN: HCPCS | Performed by: INTERNAL MEDICINE

## 2024-12-03 PROCEDURE — 3079F DIAST BP 80-89 MM HG: CPT | Performed by: INTERNAL MEDICINE

## 2024-12-03 PROCEDURE — 1159F MED LIST DOCD IN RCRD: CPT | Performed by: INTERNAL MEDICINE

## 2024-12-03 PROCEDURE — 1126F AMNT PAIN NOTED NONE PRSNT: CPT | Performed by: INTERNAL MEDICINE

## 2024-12-03 PROCEDURE — G8417 CALC BMI ABV UP PARAM F/U: HCPCS | Performed by: INTERNAL MEDICINE

## 2024-12-03 PROCEDURE — 3075F SYST BP GE 130 - 139MM HG: CPT | Performed by: INTERNAL MEDICINE

## 2025-01-09 ENCOUNTER — HOSPITAL ENCOUNTER (OUTPATIENT)
Dept: OCCUPATIONAL THERAPY | Age: 75
Setting detail: THERAPIES SERIES
Discharge: HOME | End: 2025-01-09
Payer: MEDICARE

## 2025-01-09 DIAGNOSIS — C50.912: Primary | ICD-10-CM

## 2025-01-09 PROCEDURE — 93702 BIS XTRACELL FLUID ANALYSIS: CPT

## 2025-01-09 PROCEDURE — 97165 OT EVAL LOW COMPLEX 30 MIN: CPT

## 2025-01-09 NOTE — PROGRESS NOTES
Occupational Therapy: Initial Evaluation   Patient: Ester Blackwell (74 y.o. female)   Examination Date: 2025  Plan of Care Certification Period: 2025 to  2025      :  1950  MRN: [id number]  CSN: [id number]   Insurance: Payor: MEDICARE / Plan: MEDICARE PART A AND B / Product Type: *No Product type* /   Insurance ID: [id number] - (Medicare) Secondary Insurance (if applicable): [employer] [facility]   Insurance Information: Medicare & [employer]   Referring Physician: Albert Sweeney MD Wederson Claudino, MD   PCP: Sulaiman Sena MD Visits to Date/Visits Approved:   /      No Show/Cancelled Appts:    /       Medical Diagnosis: Malignant neoplasm of unspecified site of left female breast [C50.912] C50.912  No data recorded     PERTINENT MEDICAL HISTORY   Patient assessed for rehabilitation services?: Yes  Self reported health status:: Excellent    Medical History: Chart Reviewed: Yes   Past Medical History:   Diagnosis Date    Adrenal insufficiency (HCC)     Autonomic nervous system disease or syndrome     CFS (chronic fatigue syndrome)     GERD (gastroesophageal reflux disease)     Hypertension     Kidney disease     third stage    Thyroid condition      Surgical History:   Past Surgical History:   Procedure Laterality Date    APPENDECTOMY      CARDIAC CATHETERIZATION       SECTION      x2    CHOLECYSTECTOMY      COLONOSCOPY      COLONOSCOPY N/A 2018    Dr Stanton-Tubular AP (-) dysplasia x 1, HP x 2, BCM x 1--5 yr recall    FINGER SURGERY Left 2023    HYSTERECTOMY (CERVIX STATUS UNKNOWN)      NECK SURGERY      OVARY REMOVAL      [place] EGD TRANSORAL BIOPSY SINGLE/MULTIPLE N/A 2018    Dr Stanton- Jumana (-)    UPPER GASTROINTESTINAL ENDOSCOPY            SUBJECTIVE EXAMINATION     History obtained from:: Patient, Chart Review,      Family/Caregiver Present: Yes    Subjective History: Onset Date: 27       Lymphedema History (if Applicable): Oncology History (if

## 2025-02-03 ENCOUNTER — HOSPITAL ENCOUNTER (OUTPATIENT)
Dept: MAMMOGRAPHY | Facility: HOSPITAL | Age: 75
Discharge: HOME | End: 2025-02-03
Admitting: STUDENT IN AN ORGANIZED HEALTH CARE EDUCATION/TRAINING PROGRAM
Payer: MEDICARE

## 2025-02-03 DIAGNOSIS — C50.812 MALIGNANT NEOPLASM OF OVERLAPPING SITES OF LEFT BREAST IN FEMALE, ESTROGEN RECEPTOR POSITIVE: ICD-10-CM

## 2025-02-03 DIAGNOSIS — Z98.890 S/P LUMPECTOMY, LEFT BREAST: ICD-10-CM

## 2025-02-03 DIAGNOSIS — Z17.0 MALIGNANT NEOPLASM OF OVERLAPPING SITES OF LEFT BREAST IN FEMALE, ESTROGEN RECEPTOR POSITIVE: ICD-10-CM

## 2025-02-03 DIAGNOSIS — C50.919 INVASIVE LOBULAR CARCINOMA OF BREAST IN FEMALE: ICD-10-CM

## 2025-02-03 PROCEDURE — G0279 TOMOSYNTHESIS, MAMMO: HCPCS

## 2025-02-03 PROCEDURE — 77065 DX MAMMO INCL CAD UNI: CPT

## 2025-02-13 ENCOUNTER — OFFICE VISIT (OUTPATIENT)
Dept: UROLOGY | Age: 75
End: 2025-02-13
Payer: MEDICARE

## 2025-02-13 VITALS — HEIGHT: 63 IN | WEIGHT: 159.2 LBS | BODY MASS INDEX: 28.21 KG/M2 | TEMPERATURE: 97.7 F

## 2025-02-13 DIAGNOSIS — N39.0 RECURRENT UTI: Primary | ICD-10-CM

## 2025-02-13 DIAGNOSIS — N89.8 VAGINAL DRYNESS: ICD-10-CM

## 2025-02-13 DIAGNOSIS — R30.0 DYSURIA: ICD-10-CM

## 2025-02-13 LAB
APPEARANCE FLUID: CLEAR
BILIRUBIN, POC: NORMAL
BLOOD URINE, POC: NORMAL
CLARITY, POC: CLEAR
COLOR, POC: YELLOW
GLUCOSE URINE, POC: NORMAL MG/DL
KETONES, POC: NORMAL MG/DL
LEUKOCYTE EST, POC: NORMAL
NITRITE, POC: NORMAL
PH, POC: 5.5
PROTEIN, POC: NORMAL MG/DL
SPECIFIC GRAVITY, POC: 1.01
UROBILINOGEN, POC: 0.2 MG/DL

## 2025-02-13 PROCEDURE — 81002 URINALYSIS NONAUTO W/O SCOPE: CPT | Performed by: NURSE PRACTITIONER

## 2025-02-13 PROCEDURE — 1090F PRES/ABSN URINE INCON ASSESS: CPT | Performed by: NURSE PRACTITIONER

## 2025-02-13 PROCEDURE — 99214 OFFICE O/P EST MOD 30 MIN: CPT | Performed by: NURSE PRACTITIONER

## 2025-02-13 PROCEDURE — G8417 CALC BMI ABV UP PARAM F/U: HCPCS | Performed by: NURSE PRACTITIONER

## 2025-02-13 PROCEDURE — 1159F MED LIST DOCD IN RCRD: CPT | Performed by: NURSE PRACTITIONER

## 2025-02-13 PROCEDURE — G8427 DOCREV CUR MEDS BY ELIG CLIN: HCPCS | Performed by: NURSE PRACTITIONER

## 2025-02-13 PROCEDURE — 1123F ACP DISCUSS/DSCN MKR DOCD: CPT | Performed by: NURSE PRACTITIONER

## 2025-02-13 PROCEDURE — 1036F TOBACCO NON-USER: CPT | Performed by: NURSE PRACTITIONER

## 2025-02-13 PROCEDURE — 3017F COLORECTAL CA SCREEN DOC REV: CPT | Performed by: NURSE PRACTITIONER

## 2025-02-13 PROCEDURE — G8399 PT W/DXA RESULTS DOCUMENT: HCPCS | Performed by: NURSE PRACTITIONER

## 2025-02-13 RX ORDER — PHENAZOPYRIDINE HYDROCHLORIDE 100 MG/1
100 TABLET, FILM COATED ORAL 3 TIMES DAILY PRN
Qty: 15 TABLET | Refills: 0 | Status: SHIPPED | OUTPATIENT
Start: 2025-02-13

## 2025-02-13 RX ORDER — CARVEDILOL 3.12 MG/1
3.12 TABLET ORAL 2 TIMES DAILY WITH MEALS
COMMUNITY

## 2025-02-13 ASSESSMENT — ENCOUNTER SYMPTOMS
NAUSEA: 0
VOMITING: 0
ABDOMINAL DISTENTION: 0
ABDOMINAL PAIN: 0
BACK PAIN: 0

## 2025-02-13 NOTE — PROGRESS NOTES
Urinalysis no Micro   Result Value Ref Range    Color, UA yellow     Clarity, UA clear     Glucose, UA POC neg mg/dL    Bilirubin, UA neg     Ketones, UA neg mg/dL    Spec Grav, UA 1.010     Blood, UA POC neg     pH, UA 5.5     Protein, UA POC neg mg/dL    Urobilinogen, UA 0.2 mg/dL    Leukocytes, UA neg     Nitrite, UA neg     Appearance, Fluid Clear Clear, Slightly Cloudy       1. Recurrent UTI  Patient with new onset recurrent UTI.  This was previously managed with compounded hormonal therapy from [facility].  Unsure if she is actually having UTIs versus vaginal irritation from dryness.  I will obtain her cultures from her PCP.  Continue Ellura and postcoital Macrodantin.  Will also send her in a nonhormonal compounded cream to [facility].  Samples of Via given in office.    - POCT Urinalysis no Micro    2. Dysuria  Complains of dysuria, frequency and urgency.  Urine does not appear infected however we will go ahead and send urine culture and give her Pyridium as needed.  I will call her with culture results if this is negative this is likely secondary to vaginal dryness.  - phenazopyridine (PYRIDIUM) 100 MG tablet; Take 1 tablet by mouth 3 times daily as needed for Pain  Dispense: 15 tablet; Refill: 0  - Culture, Urine    3. Vaginal dryness  We will give her samples of Via today.  Will also reach out to [facility] to see what kind of nonhormonal compounded vaginal cream they offer.      Orders Placed This Encounter   Procedures    Culture, Urine    POCT Urinalysis no Micro        Return in about 3 months (around 5/13/2025).    All information inputted into the note by the MA to include chief complaint, past medical history, past surgical history, medications, allergies, social and family history and review of systems has been reviewed and updated as needed by me.    EMR Dragon/transcription disclaimer: Much of this documentt is electronic  transcription/translation of spoken language to printed text.

## 2025-02-15 LAB
BACTERIA UR CULT: ABNORMAL
ORGANISM: ABNORMAL

## 2025-02-17 ENCOUNTER — TELEPHONE (OUTPATIENT)
Dept: UROLOGY | Age: 75
End: 2025-02-17

## 2025-02-17 DIAGNOSIS — R30.0 DYSURIA: Primary | ICD-10-CM

## 2025-02-17 RX ORDER — NITROFURANTOIN 25; 75 MG/1; MG/1
100 CAPSULE ORAL 2 TIMES DAILY
Qty: 20 CAPSULE | Refills: 0 | Status: SHIPPED | OUTPATIENT
Start: 2025-02-17 | End: 2025-02-27

## 2025-02-17 NOTE — TELEPHONE ENCOUNTER
Patient called to speak with nurse over cultures and current issues she's having. Please contact patient.

## 2025-03-03 ENCOUNTER — OFFICE VISIT (OUTPATIENT)
Dept: SURGERY | Facility: CLINIC | Age: 75
End: 2025-03-03
Payer: MEDICARE

## 2025-03-03 VITALS
OXYGEN SATURATION: 96 % | HEART RATE: 60 BPM | SYSTOLIC BLOOD PRESSURE: 144 MMHG | DIASTOLIC BLOOD PRESSURE: 83 MMHG | BODY MASS INDEX: 28.52 KG/M2 | WEIGHT: 155 LBS | HEIGHT: 62 IN

## 2025-03-03 DIAGNOSIS — E66.3 OVERWEIGHT WITH BODY MASS INDEX (BMI) OF 28 TO 28.9 IN ADULT: ICD-10-CM

## 2025-03-03 DIAGNOSIS — C50.812 MALIGNANT NEOPLASM OF OVERLAPPING SITES OF LEFT FEMALE BREAST, UNSPECIFIED ESTROGEN RECEPTOR STATUS: ICD-10-CM

## 2025-03-03 DIAGNOSIS — Z98.890 S/P LUMPECTOMY, LEFT BREAST: ICD-10-CM

## 2025-03-03 DIAGNOSIS — C50.919 INVASIVE LOBULAR CARCINOMA OF BREAST IN FEMALE: Primary | ICD-10-CM

## 2025-03-03 PROCEDURE — 1160F RVW MEDS BY RX/DR IN RCRD: CPT | Performed by: STUDENT IN AN ORGANIZED HEALTH CARE EDUCATION/TRAINING PROGRAM

## 2025-03-03 PROCEDURE — 99213 OFFICE O/P EST LOW 20 MIN: CPT | Performed by: STUDENT IN AN ORGANIZED HEALTH CARE EDUCATION/TRAINING PROGRAM

## 2025-03-03 PROCEDURE — 1159F MED LIST DOCD IN RCRD: CPT | Performed by: STUDENT IN AN ORGANIZED HEALTH CARE EDUCATION/TRAINING PROGRAM

## 2025-03-03 NOTE — PROGRESS NOTES
Office Established Patient Note:     Referring Provider: No ref. provider found    Chief Complaint   Patient presents with    Invasive lobular carcinoma of breast in female       Subjective .     History of present illness:  Maritza Mobley is a 74 y.o. female s/p left lumpectomy and sentinel lymph node biopsy on 8/27/24 for pT1a N0 M0 invasive lobular carcinoma. No adjuvant radiation nor chemotherapy recommended She is on anti-hormone therapy from Dr. Osorio.   History of Present Illness  She reports no current concerns regarding her breasts, with no new masses, skin changes, or nipple discharge. She has been under the care of Dr. Negron, who has expressed satisfaction with her progress. She has been on an antihormone medication prescribed by Dr. Negron since 01/2025, which she has been tolerating well. However, she has experienced recurrent urinary infections, a known side effect of the medication.     Supplemental Information  She had influenza and COVID-19 infection.       History  Past Medical History:   Diagnosis Date    Arthritis     Breast cancer     Colitis     Disease of thyroid gland     Dysautonomia     GERD (gastroesophageal reflux disease)     Hashimoto thyroiditis, fibrous variant 2018    Hyperlipidemia     Hypertension     Kidney disease     Osteopenia     PONV (postoperative nausea and vomiting)    ,   Past Surgical History:   Procedure Laterality Date    ANKLE SURGERY Right     ARM LESION/CYST EXCISION Left 01/11/2023    Procedure: LEFT RING FINGER MASS EXCISION;  Surgeon: Klever Gonzalez MD;  Location: [facility] OR;  Service: Orthopedics;  Laterality: Left;    BREAST BIOPSY Left     BREAST LUMPECTOMY WITH SENTINEL NODE BIOPSY Left 08/27/2024    Procedure: LEFT BREAST LUMPECTOMY WITH MAG SEED GUIDANCE AND MAGTRACE GUIDED SENTINEL LYMPH NODE BIOPSY;  Surgeon: Grace Hayes MD;  Location:  [facility] OR;  Service: General;  Laterality: Left;    CARDIAC CATHETERIZATION      x2    CERVICAL  SPINE SURGERY       SECTION      x2    DILATATION AND CURETTAGE      HYSTERECTOMY     ,   Family History   Problem Relation Age of Onset    Breast cancer Neg Hx    ,   Social History     Tobacco Use    Smoking status: Never     Passive exposure: Never    Smokeless tobacco: Never   Vaping Use    Vaping status: Never Used   Substance Use Topics    Alcohol use: Not Currently    Drug use: Never   , (Not in a hospital admission)   and Allergies:  Levaquin [levofloxacin], Contrast dye (echo or unknown ct/mr), Metoprolol, Demerol [meperidine], Morphine, and Percocet [oxycodone-acetaminophen]    Current Outpatient Medications:     calcium carbonate (OS-LEYDI) 600 MG tablet, Take 1 tablet by mouth Daily., Disp: , Rfl:     carbidopa-levodopa-entacapone (STALEVO) 31.-200 MG per tablet, Take 1 tablet by mouth 2 (Two) Times a Day., Disp: , Rfl:     Cranberry (Ellura) 200 MG capsule, Take 200 mg by mouth Daily., Disp: , Rfl:     dilTIAZem CD (CARDIZEM CD) 120 MG 24 hr capsule, Take 1 capsule by mouth Daily., Disp: , Rfl:     ezetimibe (ZETIA) 10 MG tablet, Take 1 tablet by mouth Daily., Disp: , Rfl:     levothyroxine (SYNTHROID, LEVOTHROID) 75 MCG tablet, Take 1 tablet by mouth Daily., Disp: , Rfl:     NALTREXONE HCL PO, Take 4.5 mg by mouth Daily. LOW DOSE FOR AUTOIMMUNE DISEASE, Disp: , Rfl:     nitrofurantoin (MACRODANTIN) 100 MG capsule, Take 1 capsule by mouth As Needed., Disp: , Rfl:     nitroglycerin (NITROSTAT) 0.4 MG SL tablet, Place 1 tablet under the tongue Every 5 (Five) Minutes As Needed for Chest Pain. Take no more than 3 doses in 15 minutes., Disp: , Rfl:     Nutritional Supplements (HOMOCYSTEINE SUPPORT PO), Take  by mouth Daily., Disp: , Rfl:     omeprazole (priLOSEC) 40 MG capsule, Take 1 capsule by mouth Daily., Disp: , Rfl:     Specialty Vitamins Products (Adrenal Stress Calm) tablet, Take  by mouth Daily., Disp: , Rfl:     Thyroid 60 MG PO tablet, Take 1 tablet by mouth Daily., Disp: , Rfl:      "valACYclovir (VALTREX) 500 MG tablet, Take 1 tablet by mouth As Needed., Disp: , Rfl:     Zinc Gluconate 30 MG tablet, Take 1 tablet by mouth Daily., Disp: , Rfl:     Current Facility-Administered Medications:     lidocaine (XYLOCAINE) 1 % injection 10 mL, 10 mL, Subcutaneous, Once, Martha Cruz MD    lidocaine (XYLOCAINE) 1 % injection 10 mL, 10 mL, Subcutaneous, Once, Eleno Lang MD    lidocaine 1% - EPINEPHrine 1:685290 (XYLOCAINE W/EPI) 1 %-1:126351 injection 10 mL, 10 mL, Injection, Once, Martha Cruz MD      Objective     Vital Signs   /83   Pulse 60   Ht 157.5 cm (62\")   Wt 70.3 kg (155 lb)   SpO2 96%   BMI 28.35 kg/m²      Physical Exam:  General appearance - alert, well appearing, and in no distress  Mental status - alert, oriented to person, place, and time  Eyes - pupils equal and reactive, extraocular eye movements intact  Neurological - alert, oriented, normal speech, no focal findings or movement disorder noted  Breast Exam: Bilateral breasts without obvious deformities. Bilateral nipples everted. Patient examined in the supine position with the ipsilateral arm above the head. Left breast and axillary incisions well healed. No palpable masses bilaterally. No nipple discharge bilaterally. No palpable axillary nor supraclavicular adenopathy bilaterally.   Physical Exam    Results Review:     The following data was reviewed by: Grace Hayes MD on 03/03/2025:  Dr. Osorio's note reviewed.   Mammo Diagnostic Digital Tomosynthesis Left With CAD (02/03/2025 12:55)   Results  Imaging  Left mammogram shows no concerns.       Assessment & Plan       Diagnoses and all orders for this visit:    1. Invasive lobular carcinoma of breast in female (Primary)  -     Mammo Diagnostic Digital Tomosynthesis Bilateral With CAD; Future    2. S/P lumpectomy, left breast  -     Mammo Diagnostic Digital Tomosynthesis Bilateral With CAD; Future    3. Overweight with body mass index (BMI) of " 28 to 28.9 in adult    4. Malignant neoplasm of overlapping sites of left female breast, unspecified estrogen receptor status  -     Mammo Diagnostic Digital Tomosynthesis Bilateral With CAD; Future       Assessment & Plan  1. Post-operative status following left lumpectomy and sentinel lymph node biopsy.  She is currently six months post-procedure. The recent mammogram results were unremarkable, indicating no cause for concern. Both incisions from the surgery look good. A follow-up mammogram for both breasts has been scheduled for six months from now.    Follow-up  The patient will follow up in 6 months.    PROCEDURE  The patient underwent a left lumpectomy and sentinel lymph node biopsy in 08/2024.       Grace Hayes MD  03/03/25  13:36 CST    Patient or patient representative verbalized consent for the use of Ambient Listening during the visit with  Grace Hayes MD for chart documentation. 3/3/2025  17:11 CST

## 2025-03-03 NOTE — PATIENT INSTRUCTIONS

## 2025-03-06 ENCOUNTER — OFFICE VISIT (OUTPATIENT)
Dept: UROLOGY | Age: 75
End: 2025-03-06
Payer: MEDICARE

## 2025-03-06 VITALS — BODY MASS INDEX: 28.74 KG/M2 | WEIGHT: 162.2 LBS | HEIGHT: 63 IN | TEMPERATURE: 97.1 F

## 2025-03-06 DIAGNOSIS — N39.0 RECURRENT UTI: Primary | ICD-10-CM

## 2025-03-06 DIAGNOSIS — R10.9 LEFT FLANK PAIN: ICD-10-CM

## 2025-03-06 DIAGNOSIS — R30.0 DYSURIA: ICD-10-CM

## 2025-03-06 LAB
BACTERIA URINE, POC: 0
BILIRUBIN URINE: 0 MG/DL
BLOOD, URINE: NEGATIVE
CASTS URINE, POC: 0
CLARITY, UA: CLEAR
COLOR, UA: YELLOW
CRYSTALS URINE, POC: 0
EPI CELLS URINE, POC: 0
GLUCOSE URINE: NORMAL
KETONES, URINE: NEGATIVE
LEUKOCYTE EST, POC: NORMAL
NITRITE, URINE: NEGATIVE
PH UA: 5.5 (ref 4.5–8)
PROTEIN UA: NEGATIVE
RBC URINE, POC: 1
SPECIFIC GRAVITY UA: 1 (ref 1–1.03)
UROBILINOGEN, URINE: NORMAL
WBC URINE, POC: 1
YEAST URINE, POC: 0

## 2025-03-06 PROCEDURE — 99214 OFFICE O/P EST MOD 30 MIN: CPT | Performed by: NURSE PRACTITIONER

## 2025-03-06 PROCEDURE — 81001 URINALYSIS AUTO W/SCOPE: CPT | Performed by: NURSE PRACTITIONER

## 2025-03-06 PROCEDURE — 1036F TOBACCO NON-USER: CPT | Performed by: NURSE PRACTITIONER

## 2025-03-06 PROCEDURE — G8428 CUR MEDS NOT DOCUMENT: HCPCS | Performed by: NURSE PRACTITIONER

## 2025-03-06 PROCEDURE — G8417 CALC BMI ABV UP PARAM F/U: HCPCS | Performed by: NURSE PRACTITIONER

## 2025-03-06 PROCEDURE — 1090F PRES/ABSN URINE INCON ASSESS: CPT | Performed by: NURSE PRACTITIONER

## 2025-03-06 PROCEDURE — P9612 CATHETERIZE FOR URINE SPEC: HCPCS | Performed by: NURSE PRACTITIONER

## 2025-03-06 PROCEDURE — G8399 PT W/DXA RESULTS DOCUMENT: HCPCS | Performed by: NURSE PRACTITIONER

## 2025-03-06 PROCEDURE — 1123F ACP DISCUSS/DSCN MKR DOCD: CPT | Performed by: NURSE PRACTITIONER

## 2025-03-06 PROCEDURE — 3017F COLORECTAL CA SCREEN DOC REV: CPT | Performed by: NURSE PRACTITIONER

## 2025-03-06 RX ORDER — NITROFURANTOIN 25; 75 MG/1; MG/1
100 CAPSULE ORAL 2 TIMES DAILY
Qty: 14 CAPSULE | Refills: 0 | Status: SHIPPED | OUTPATIENT
Start: 2025-03-06 | End: 2025-03-13

## 2025-03-06 ASSESSMENT — ENCOUNTER SYMPTOMS
VOMITING: 0
ABDOMINAL PAIN: 1
BACK PAIN: 0
NAUSEA: 0
ABDOMINAL DISTENTION: 0

## 2025-03-06 NOTE — PROGRESS NOTES
Patient complained of dysuria and flank pain and fever. After discussing with GABBY Stoddard I was given verbal orders to obtain cath urine specimen.     After obtaining consent from patient. Patient was then placed in the dorsal lithotomy position. Using sterile technique patient is carefully prepped with Betadine around the urethra. A pediatric catheterization kit is used which contains an approximate 5 Latvian catheter. Urethral Catheter is introduced into the urinary bladder. Urine specimen is obtained and sent to the lab for culture and sensitivity. Patient tolerated procedure well.     GABBY King was in office at time of procedure.     
Dr Stanton-Tubular AP (-) dysplasia x 1, HP x 2, BCM x 1--5 yr recall    FINGER SURGERY Left 01/2023    HYSTERECTOMY (CERVIX STATUS UNKNOWN)      NECK SURGERY      OVARY REMOVAL      IA EGD TRANSORAL BIOPSY SINGLE/MULTIPLE N/A 02/05/2018    Dr Stanton- Jumana (-)    UPPER GASTROINTESTINAL ENDOSCOPY         Current Outpatient Medications   Medication Sig Dispense Refill    nitrofurantoin, macrocrystal-monohydrate, (MACROBID) 100 MG capsule Take 1 capsule by mouth 2 times daily for 7 days 14 capsule 0    carvedilol (COREG) 3.125 MG tablet Take 1 tablet by mouth 2 times daily (with meals)      phenazopyridine (PYRIDIUM) 100 MG tablet Take 1 tablet by mouth 3 times daily as needed for Pain 15 tablet 0    tamoxifen (NOLVADEX) 10 MG tablet Take 1 tablet by mouth daily 90 tablet 3    oxyBUTYnin (DITROPAN) 5 MG tablet Take 1 tablet by mouth 2 times daily 60 tablet 0    Est Estrogens-Methyltest (COVARYX PO) estrogens-methyltestosterone   Compound Cream - 1 application daily (Patient not taking: Reported on 2/13/2025)      Cranberry (ELLURA) 200 MG CAPS Take 200 mg by mouth daily 90 capsule 3    dilTIAZem (CARDIZEM 12 HR) 120 MG extended release capsule Take 1 capsule by mouth daily 90 capsule 3    nitroGLYCERIN (NITROSTAT) 0.4 MG SL tablet Place 1 tablet under the tongue every 5 minutes as needed for Chest pain (Patient not taking: Reported on 2/13/2025) 25 tablet 3    ezetimibe (ZETIA) 10 MG tablet Take 1 tablet by mouth daily 90 tablet 3    Coenzyme Q10 10 MG CAPS Take by mouth      levothyroxine (SYNTHROID) 50 MCG tablet Take 88 mcg by mouth daily      thyroid (ARMOUR) 60 MG tablet Take 0.5 tablets by mouth daily      aspirin 81 MG tablet Take 1 tablet by mouth daily Taking 2 days a week      valACYclovir (VALTREX) 500 MG tablet Take 1 tablet by mouth as needed (fever blister)      omeprazole (PRILOSEC) 40 MG delayed release capsule Take 1 capsule by mouth daily      Omega-3 Fatty Acids (FISH OIL) 1000 MG CAPS Take 1 capsule

## 2025-03-08 LAB — BACTERIA UR CULT: NORMAL

## 2025-03-10 ENCOUNTER — RESULTS FOLLOW-UP (OUTPATIENT)
Dept: UROLOGY | Age: 75
End: 2025-03-10

## 2025-03-10 DIAGNOSIS — R10.9 LEFT FLANK PAIN: Primary | ICD-10-CM

## 2025-03-31 ENCOUNTER — HOSPITAL ENCOUNTER (OUTPATIENT)
Dept: CT IMAGING | Age: 75
Discharge: HOME | End: 2025-03-31
Payer: MEDICARE

## 2025-03-31 ENCOUNTER — RESULTS FOLLOW-UP (OUTPATIENT)
Dept: UROLOGY | Age: 75
End: 2025-03-31

## 2025-03-31 DIAGNOSIS — R10.9 LEFT FLANK PAIN: ICD-10-CM

## 2025-03-31 PROCEDURE — 74176 CT ABD & PELVIS W/O CONTRAST: CPT

## 2025-04-08 ENCOUNTER — TELEPHONE (OUTPATIENT)
Dept: UROLOGY | Age: 75
End: 2025-04-08

## 2025-04-08 NOTE — TELEPHONE ENCOUNTER
Patient contacted office stating this morning she started having some pain like a possible UTI. There is some blood in urine as well. Patient offered appointment for this morning and she stated she would not do today at 11:15 am but can do tomorrow morning. Patient added on for appointment for tomorrow at 10:00am for possible UTI. She stated that she does have some pyridium that JUDY Saavedra had sent in a few weeks ago that she will take.

## 2025-05-13 ENCOUNTER — OFFICE VISIT (OUTPATIENT)
Dept: UROLOGY | Age: 75
End: 2025-05-13

## 2025-05-13 VITALS — BODY MASS INDEX: 28.7 KG/M2 | TEMPERATURE: 97.4 F | HEIGHT: 63 IN | WEIGHT: 162 LBS

## 2025-05-13 DIAGNOSIS — N39.0 RECURRENT UTI: Primary | ICD-10-CM

## 2025-05-13 DIAGNOSIS — R30.0 DYSURIA: ICD-10-CM

## 2025-05-13 DIAGNOSIS — N89.8 VAGINAL DRYNESS: ICD-10-CM

## 2025-05-13 LAB
APPEARANCE FLUID: CLEAR
BILIRUBIN, POC: NORMAL
BLOOD URINE, POC: NORMAL
CLARITY, POC: CLEAR
COLOR, POC: YELLOW
GLUCOSE URINE, POC: NORMAL MG/DL
KETONES, POC: NORMAL MG/DL
LEUKOCYTE EST, POC: NORMAL
NITRITE, POC: NORMAL
PH, POC: 5.5
PROTEIN, POC: NORMAL MG/DL
SPECIFIC GRAVITY, POC: 1
UROBILINOGEN, POC: 0.2 MG/DL

## 2025-05-13 RX ORDER — HYDROXYZINE HYDROCHLORIDE 25 MG/1
25 TABLET, FILM COATED ORAL EVERY 8 HOURS PRN
Qty: 30 TABLET | Refills: 6 | Status: SHIPPED | OUTPATIENT
Start: 2025-05-13

## 2025-05-13 RX ORDER — PHENAZOPYRIDINE HYDROCHLORIDE 100 MG/1
100 TABLET, FILM COATED ORAL 3 TIMES DAILY PRN
Qty: 30 TABLET | Refills: 5 | Status: SHIPPED | OUTPATIENT
Start: 2025-05-13 | End: 2026-05-13

## 2025-05-13 NOTE — PROGRESS NOTES
Ester Blackwell is a 74 y.o. female who presents today   Chief Complaint   Patient presents with    New Patient     I am here today for my 3 month UTI and dysuria follow up       Send chart to amadou christianson with pain     Patient is 74-year-old female presents clinic today with complaints of intermittent dysuria, frequency, bladder pain.  She has had 1 positive urine culture but repeat urine cultures with pain present have been negative.  She has not had any issues until recently after she has been treated for breast cancer.  She was on HRT.  Complains of frequency, urgency and bladder spasms, bladder pain, dysuria.  Left flank pain with radiation to left lower quadrant intermittently CT recently was negative for stones.    Previous urine cultures  3/6/25   No growth   2/13/2025                    Enterococcus faecalis, lactobacillus, dermabacter  10/24/2024                  multiple organisms no sensitivities     She was diagnosed with breast cancer in September 2024 underwent lumpectomy. She did not require any additional radiation. She is currently maintained on tamoxifen. Now on oxybutynin for hot flashes (she is only able to tolerate this once daily due to nausea). She does occasionally use over-the-counter replenish for vaginal dryness.  At last visit I placed her on  Via. She is still sexually active approximately once every 2 weeks.  She is currently maintained on Ellura.  She is no longer sexually active due to dyspareunia.  She reports intermittent episodes of pain which she utilizes Azo/Pyridium increases her fluid intake.    Past Medical History:   Diagnosis Date    Adrenal insufficiency     Autonomic nervous system disease or syndrome 2018    CFS (chronic fatigue syndrome)     GERD (gastroesophageal reflux disease)     H/O lumpectomy     left    History of ankle surgery     Hypertension     Kidney disease     third stage    Thyroid condition        Past Surgical History:   Procedure Laterality

## 2025-05-14 DIAGNOSIS — C50.912: ICD-10-CM

## 2025-05-14 RX ORDER — OXYBUTYNIN CHLORIDE 5 MG/1
5 TABLET ORAL 2 TIMES DAILY
Qty: 60 TABLET | Refills: 0 | Status: SHIPPED | OUTPATIENT
Start: 2025-05-14

## 2025-05-29 ENCOUNTER — TELEPHONE (OUTPATIENT)
Dept: HEMATOLOGY | Age: 75
End: 2025-05-29

## 2025-05-29 NOTE — TELEPHONE ENCOUNTER
I called patient and reminded patient of their appt on 06/03/2025 at the [facility] in [facility]. Patient knows they need to get bloodwork 1-2 days before their appointment so that the provider will have lab results back in time. Patient voiced understanding and confirmed they would be there. I also made patient aware that when they go to have labs drawn, that our labs are not fasting labs and they are allowed to eat but we ask that they also drink plenty of water to hydrate their veins before the lab draw appointment to help make the lab draw easier for them and the phlebotomist. Made patient aware that if they had any questions or could not make the appointment, to please call our office in [place] at [phone].   Patient stated she was unaware that she needed lab work. I made her aware that I would let Sarahi know and have Yancy to call her and discuss the lab orders and let her know where to go to get them drawn if she is going to need lab work for her appointment on 06/03/25. Patient voiced her understanding.    I sent Sarahi a detailed message via Epic secure chat on 05/29/25 at 12:55 pm.

## 2025-06-02 DIAGNOSIS — C50.912: Primary | ICD-10-CM

## 2025-06-02 NOTE — PROGRESS NOTES
73.5 kg (162 lb)   03/06/25 73.6 kg (162 lb 3.2 oz)     Physical Exam  Vitals reviewed.   Constitutional:       General: She is not in acute distress.     Appearance: She is well-developed. She is not toxic-appearing or diaphoretic.   HENT:      Head: Normocephalic and atraumatic.      Right Ear: External ear normal.      Left Ear: External ear normal.      Nose: Nose normal.      Mouth/Throat:      Mouth: Mucous membranes are moist.   Eyes:      General: No scleral icterus.        Right eye: No discharge.         Left eye: No discharge.      Conjunctiva/sclera: Conjunctivae normal.   Neck:      Trachea: No tracheal deviation.   Cardiovascular:      Rate and Rhythm: Normal rate and regular rhythm.      Heart sounds: No murmur heard.  Pulmonary:      Effort: Pulmonary effort is normal. No respiratory distress.      Breath sounds: Normal breath sounds. No wheezing or rales.   Chest:          Comments: No axillary lymphadenopathies on the left or right  Abdominal:      General: Bowel sounds are normal. There is no distension.      Palpations: Abdomen is soft.      Tenderness: There is no abdominal tenderness. There is no guarding.   Genitourinary:     Comments: Exam deferred  Musculoskeletal:         General: No tenderness or deformity.      Cervical back: Neck supple. No tenderness.      Comments: Normal ROM all four extremities   Lymphadenopathy:      Cervical:      Right cervical: No superficial, deep or posterior cervical adenopathy.     Left cervical: No superficial, deep or posterior cervical adenopathy.      Upper Body:      Right upper body: No supraclavicular adenopathy.      Left upper body: No supraclavicular adenopathy.      Comments:      Skin:     General: Skin is warm and dry.      Findings: No rash.   Neurological:      Mental Status: She is alert and oriented to person, place, and time.      Comments: follows commands, non-focal   Psychiatric:         Behavior: Behavior normal. Behavior is cooperative.

## 2025-06-03 ENCOUNTER — OFFICE VISIT (OUTPATIENT)
Dept: HEMATOLOGY | Age: 75
End: 2025-06-03
Payer: MEDICARE

## 2025-06-03 VITALS
OXYGEN SATURATION: 99 % | HEART RATE: 61 BPM | WEIGHT: 162 LBS | TEMPERATURE: 98.4 F | BODY MASS INDEX: 29.16 KG/M2 | SYSTOLIC BLOOD PRESSURE: 148 MMHG | DIASTOLIC BLOOD PRESSURE: 82 MMHG

## 2025-06-03 DIAGNOSIS — Z85.3 ENCOUNTER FOR FOLLOW-UP SURVEILLANCE OF BREAST CANCER: ICD-10-CM

## 2025-06-03 DIAGNOSIS — N89.8 VAGINAL DRYNESS: ICD-10-CM

## 2025-06-03 DIAGNOSIS — Z79.811 ENCOUNTER FOR MONITORING AROMATASE INHIBITOR THERAPY: ICD-10-CM

## 2025-06-03 DIAGNOSIS — Z08 ENCOUNTER FOR FOLLOW-UP SURVEILLANCE OF BREAST CANCER: ICD-10-CM

## 2025-06-03 DIAGNOSIS — Z71.89 CARE PLAN DISCUSSED WITH PATIENT: ICD-10-CM

## 2025-06-03 DIAGNOSIS — M85.80 OSTEOPENIA, UNSPECIFIED LOCATION: ICD-10-CM

## 2025-06-03 DIAGNOSIS — Z51.81 ENCOUNTER FOR MONITORING AROMATASE INHIBITOR THERAPY: ICD-10-CM

## 2025-06-03 DIAGNOSIS — C50.912: Primary | ICD-10-CM

## 2025-06-03 DIAGNOSIS — N95.1 HOT FLASHES DUE TO MENOPAUSE: ICD-10-CM

## 2025-06-03 DIAGNOSIS — R30.0 DYSURIA: ICD-10-CM

## 2025-06-03 PROCEDURE — G8399 PT W/DXA RESULTS DOCUMENT: HCPCS | Performed by: NURSE PRACTITIONER

## 2025-06-03 PROCEDURE — 3077F SYST BP >= 140 MM HG: CPT | Performed by: NURSE PRACTITIONER

## 2025-06-03 PROCEDURE — 3017F COLORECTAL CA SCREEN DOC REV: CPT | Performed by: NURSE PRACTITIONER

## 2025-06-03 PROCEDURE — G8417 CALC BMI ABV UP PARAM F/U: HCPCS | Performed by: NURSE PRACTITIONER

## 2025-06-03 PROCEDURE — G8427 DOCREV CUR MEDS BY ELIG CLIN: HCPCS | Performed by: NURSE PRACTITIONER

## 2025-06-03 PROCEDURE — 99214 OFFICE O/P EST MOD 30 MIN: CPT | Performed by: NURSE PRACTITIONER

## 2025-06-03 PROCEDURE — 1159F MED LIST DOCD IN RCRD: CPT | Performed by: NURSE PRACTITIONER

## 2025-06-03 PROCEDURE — 3079F DIAST BP 80-89 MM HG: CPT | Performed by: NURSE PRACTITIONER

## 2025-06-03 PROCEDURE — 1123F ACP DISCUSS/DSCN MKR DOCD: CPT | Performed by: NURSE PRACTITIONER

## 2025-06-03 PROCEDURE — G2211 COMPLEX E/M VISIT ADD ON: HCPCS | Performed by: NURSE PRACTITIONER

## 2025-06-03 PROCEDURE — 1090F PRES/ABSN URINE INCON ASSESS: CPT | Performed by: NURSE PRACTITIONER

## 2025-06-03 PROCEDURE — 1036F TOBACCO NON-USER: CPT | Performed by: NURSE PRACTITIONER

## 2025-06-03 ASSESSMENT — ENCOUNTER SYMPTOMS
WHEEZING: 0
EYE ITCHING: 0
SORE THROAT: 0
COUGH: 0
TROUBLE SWALLOWING: 0
EYE DISCHARGE: 0
NAUSEA: 0
VOMITING: 0
ABDOMINAL PAIN: 0
SHORTNESS OF BREATH: 0
CONSTIPATION: 0
DIARRHEA: 0

## 2025-06-04 ENCOUNTER — TELEPHONE (OUTPATIENT)
Dept: HEMATOLOGY | Age: 75
End: 2025-06-04

## 2025-06-04 NOTE — TELEPHONE ENCOUNTER
Called Patient and advised her of Bone density exam on 6/6 @ 10 at [facility].

## 2025-06-06 ENCOUNTER — HOSPITAL ENCOUNTER (OUTPATIENT)
Dept: WOMENS IMAGING | Age: 75
Discharge: HOME | End: 2025-06-06
Payer: MEDICARE

## 2025-06-06 DIAGNOSIS — Z51.81 ENCOUNTER FOR MONITORING AROMATASE INHIBITOR THERAPY: ICD-10-CM

## 2025-06-06 DIAGNOSIS — M85.80 OSTEOPENIA, UNSPECIFIED LOCATION: ICD-10-CM

## 2025-06-06 DIAGNOSIS — Z79.811 ENCOUNTER FOR MONITORING AROMATASE INHIBITOR THERAPY: ICD-10-CM

## 2025-06-06 PROCEDURE — 77080 DXA BONE DENSITY AXIAL: CPT

## 2025-08-18 ENCOUNTER — RESULTS FOLLOW-UP (OUTPATIENT)
Facility: HOSPITAL | Age: 75
End: 2025-08-18
Payer: MEDICARE

## 2025-08-18 ENCOUNTER — DOCUMENTATION (OUTPATIENT)
Dept: GENETICS | Facility: HOSPITAL | Age: 75
End: 2025-08-18
Payer: MEDICARE

## 2025-08-18 LAB — NCCN CRITERIA FLAG: ABNORMAL

## (undated) DEVICE — BNDG ESMARK 4IN 9FT LF STRL BLU

## (undated) DEVICE — FORCEPS BX 240CM 2.4MM L NDL RAD JAW 4 M00513334

## (undated) DEVICE — 4-PORT MANIFOLD: Brand: NEPTUNE 2

## (undated) DEVICE — BAPTIST TURNOVER KIT: Brand: MEDLINE INDUSTRIES, INC.

## (undated) DEVICE — SYR PRECISIONGLIDE LL 5CC 20X1 1/2IN

## (undated) DEVICE — SOL HYDROGEN PEROX 3PCT 16OZ

## (undated) DEVICE — NDL HYPO PRECISIONGLIDE REG 25G 1 1/2

## (undated) DEVICE — CABL BIPOL MEGADYNE 12FT DISP

## (undated) DEVICE — SUT MNCRYL 4/0 PS2 27IN UD MCP426H

## (undated) DEVICE — SPNG GZ STRL 2S 4X4 12PLY

## (undated) DEVICE — ENDO KIT,LOURDES HOSPITAL: Brand: MEDLINE INDUSTRIES, INC.

## (undated) DEVICE — SHEET,DRAPE,53X77,STERILE: Brand: MEDLINE

## (undated) DEVICE — GLV SURG DERMASSURE GRN LF PF 7.0

## (undated) DEVICE — FORCEPS BX L240CM DIA2.4MM L NDL RAD JAW 4 133340

## (undated) DEVICE — CYSTO/BLADDER IRRIGATION SET, REGULATING CLAMP

## (undated) DEVICE — ANTIBACTERIAL UNDYED BRAIDED (POLYGLACTIN 910), SYNTHETIC ABSORBABLE SUTURE: Brand: COATED VICRYL

## (undated) DEVICE — TRAP FLD MINIVAC MEGADYNE 100ML

## (undated) DEVICE — SUT SILK 2/0 SH 30IN K833H

## (undated) DEVICE — PK EXTRM 30

## (undated) DEVICE — FRCP GRASP BABCOCK 6IN DISP STRL

## (undated) DEVICE — PAD MINOR UNIVERSAL: Brand: MEDLINE INDUSTRIES, INC.

## (undated) DEVICE — DRSNG GZ CURAD XEROFORM NONADHS 5X9IN STRL

## (undated) DEVICE — GLV SURG TRIUMPH PF LTX 7.5 STRL

## (undated) DEVICE — 3M™ IOBAN™ 2 ANTIMICROBIAL INCISE DRAPE 6650EZ: Brand: IOBAN™ 2

## (undated) DEVICE — SUT ETHLN 4/0 FS2 18IN 662H

## (undated) DEVICE — BANDAGE,GAUZE,CONFORMING,1"X75",STRL,LF: Brand: MEDLINE

## (undated) DEVICE — SYR LL TP 10ML STRL

## (undated) DEVICE — GLV SURG DERMASSURE GRN LF PF 8.0

## (undated) DEVICE — DISPOSABLE SPECIMEN RADIOGRAPHY SYSTEM WITH LOCALIZING GRID, 4.65" RADIOLUCENT GRID: Brand: ACCUGRID

## (undated) DEVICE — CVR BRD ARM 13X30

## (undated) DEVICE — ADHS SKIN PREMIERPRO EXOFIN TOPICAL HI/VISC .5ML

## (undated) DEVICE — TRAP POLYP ETRAP

## (undated) DEVICE — STERILE (14X122CM) TELESCOPICALLY-FOLDED COVER: Brand: CIV-CLEAR™ TRANSDUCER COVER

## (undated) DEVICE — APPL CHLORAPREP HI/LITE 26ML ORNG

## (undated) DEVICE — SNARE POLYP SM W13MMXL240CM SHTH DIA2.4MM OVL FLX DISP

## (undated) DEVICE — DISPOSABLE TOURNIQUET CUFF SINGLE BLADDER, SINGLE PORT AND QUICK CONNECT CONNECTOR: Brand: COLOR CUFF

## (undated) DEVICE — BRA ULTR COMFRT SURG XL

## (undated) DEVICE — CLEANER,CAUTERY TIP,2X2",STERILE: Brand: MEDLINE

## (undated) DEVICE — GLOVE,SURG,SENSICARE,ALOE,LF,PF,6: Brand: MEDLINE

## (undated) DEVICE — GLV SURG SENSICARE W/ALOE PF LF 6.5 STRL

## (undated) DEVICE — BNDG COBAN S/ADHR WRP 2IN 5YD TN

## (undated) DEVICE — PREP SOL POVIDONE/IODINE BT 4OZ

## (undated) DEVICE — DRSNG SURESITE WNDW 4X4.5

## (undated) DEVICE — SYR CONTRL LUERLOK 10CC

## (undated) DEVICE — PAD,NON-ADHERENT,3X8,STERILE,LF,1/PK: Brand: MEDLINE

## (undated) DEVICE — ELECTRD BLD EZ CLN MOD XLNG 2.75IN